# Patient Record
Sex: FEMALE | Race: WHITE | NOT HISPANIC OR LATINO | Employment: FULL TIME | URBAN - NONMETROPOLITAN AREA
[De-identification: names, ages, dates, MRNs, and addresses within clinical notes are randomized per-mention and may not be internally consistent; named-entity substitution may affect disease eponyms.]

---

## 2017-04-24 DIAGNOSIS — R53.81 MALAISE: ICD-10-CM

## 2017-04-24 DIAGNOSIS — Z00.00 GENERAL MEDICAL EXAM: Primary | ICD-10-CM

## 2017-04-26 ENCOUNTER — APPOINTMENT (OUTPATIENT)
Dept: LAB | Facility: HOSPITAL | Age: 32
End: 2017-04-26

## 2017-04-26 LAB
25(OH)D3 SERPL-MCNC: 33.4 NG/ML (ref 30–100)
ALBUMIN SERPL-MCNC: 4.8 G/DL (ref 3.4–4.8)
ALBUMIN/GLOB SERPL: 1.5 G/DL (ref 1.1–1.8)
ALP SERPL-CCNC: 76 U/L (ref 38–126)
ALT SERPL W P-5'-P-CCNC: 24 U/L (ref 9–52)
ANION GAP SERPL CALCULATED.3IONS-SCNC: 12 MMOL/L (ref 5–15)
ARTICHOKE IGE QN: 86 MG/DL (ref 1–129)
AST SERPL-CCNC: 37 U/L (ref 14–36)
BASOPHILS # BLD AUTO: 0.05 10*3/MM3 (ref 0–0.2)
BASOPHILS NFR BLD AUTO: 1 % (ref 0–2)
BILIRUB SERPL-MCNC: 1.6 MG/DL (ref 0.2–1.3)
BUN BLD-MCNC: 12 MG/DL (ref 7–21)
BUN/CREAT SERPL: 17.4 (ref 7–25)
CALCIUM SPEC-SCNC: 9.5 MG/DL (ref 8.4–10.2)
CHLORIDE SERPL-SCNC: 102 MMOL/L (ref 95–110)
CHOLEST SERPL-MCNC: 154 MG/DL (ref 0–199)
CO2 SERPL-SCNC: 27 MMOL/L (ref 22–31)
CREAT BLD-MCNC: 0.69 MG/DL (ref 0.5–1)
DEPRECATED RDW RBC AUTO: 41.2 FL (ref 36.4–46.3)
EOSINOPHIL # BLD AUTO: 0.08 10*3/MM3 (ref 0–0.7)
EOSINOPHIL NFR BLD AUTO: 1.6 % (ref 0–7)
ERYTHROCYTE [DISTWIDTH] IN BLOOD BY AUTOMATED COUNT: 12.7 % (ref 11.5–14.5)
GFR SERPL CREATININE-BSD FRML MDRD: 99 ML/MIN/1.73 (ref 64–149)
GLOBULIN UR ELPH-MCNC: 3.1 GM/DL (ref 2.3–3.5)
GLUCOSE BLD-MCNC: 92 MG/DL (ref 60–100)
HCT VFR BLD AUTO: 41.3 % (ref 35–45)
HDLC SERPL-MCNC: 44 MG/DL (ref 60–200)
HGB BLD-MCNC: 14.5 G/DL (ref 12–15.5)
IMM GRANULOCYTES # BLD: 0 10*3/MM3 (ref 0–0.02)
IMM GRANULOCYTES NFR BLD: 0 % (ref 0–0.5)
IRON 24H UR-MRATE: 107 MCG/DL (ref 37–170)
LDLC/HDLC SERPL: 2.24 {RATIO} (ref 0–3.22)
LYMPHOCYTES # BLD AUTO: 1.77 10*3/MM3 (ref 0.6–4.2)
LYMPHOCYTES NFR BLD AUTO: 36.5 % (ref 10–50)
MAGNESIUM SERPL-MCNC: 2.3 MG/DL (ref 1.6–2.3)
MCH RBC QN AUTO: 30.7 PG (ref 26.5–34)
MCHC RBC AUTO-ENTMCNC: 35.1 G/DL (ref 31.4–36)
MCV RBC AUTO: 87.5 FL (ref 80–98)
MONOCYTES # BLD AUTO: 0.37 10*3/MM3 (ref 0–0.9)
MONOCYTES NFR BLD AUTO: 7.6 % (ref 0–12)
NEUTROPHILS # BLD AUTO: 2.58 10*3/MM3 (ref 2–8.6)
NEUTROPHILS NFR BLD AUTO: 53.3 % (ref 37–80)
PLATELET # BLD AUTO: 283 10*3/MM3 (ref 150–450)
PMV BLD AUTO: 10.9 FL (ref 8–12)
POTASSIUM BLD-SCNC: 4 MMOL/L (ref 3.5–5.1)
PROT SERPL-MCNC: 7.9 G/DL (ref 6.3–8.6)
RBC # BLD AUTO: 4.72 10*6/MM3 (ref 3.77–5.16)
SODIUM BLD-SCNC: 141 MMOL/L (ref 137–145)
TRIGL SERPL-MCNC: 57 MG/DL (ref 20–199)
TSH SERPL DL<=0.05 MIU/L-ACNC: 0.84 MIU/ML (ref 0.46–4.68)
VIT B12 BLD-MCNC: 713 PG/ML (ref 239–931)
WBC NRBC COR # BLD: 4.85 10*3/MM3 (ref 3.2–9.8)

## 2017-04-26 PROCEDURE — 84443 ASSAY THYROID STIM HORMONE: CPT | Performed by: NURSE PRACTITIONER

## 2017-04-26 PROCEDURE — 83735 ASSAY OF MAGNESIUM: CPT | Performed by: NURSE PRACTITIONER

## 2017-04-26 PROCEDURE — 82306 VITAMIN D 25 HYDROXY: CPT | Performed by: NURSE PRACTITIONER

## 2017-04-26 PROCEDURE — 80061 LIPID PANEL: CPT | Performed by: NURSE PRACTITIONER

## 2017-04-26 PROCEDURE — 83525 ASSAY OF INSULIN: CPT | Performed by: NURSE PRACTITIONER

## 2017-04-26 PROCEDURE — 82607 VITAMIN B-12: CPT | Performed by: NURSE PRACTITIONER

## 2017-04-26 PROCEDURE — 83540 ASSAY OF IRON: CPT | Performed by: NURSE PRACTITIONER

## 2017-04-26 PROCEDURE — 80053 COMPREHEN METABOLIC PANEL: CPT | Performed by: NURSE PRACTITIONER

## 2017-04-26 PROCEDURE — 36415 COLL VENOUS BLD VENIPUNCTURE: CPT | Performed by: NURSE PRACTITIONER

## 2017-04-26 PROCEDURE — 85025 COMPLETE CBC W/AUTO DIFF WBC: CPT | Performed by: NURSE PRACTITIONER

## 2017-04-27 ENCOUNTER — OFFICE VISIT (OUTPATIENT)
Dept: FAMILY MEDICINE CLINIC | Facility: CLINIC | Age: 32
End: 2017-04-27

## 2017-04-27 VITALS
HEIGHT: 67 IN | SYSTOLIC BLOOD PRESSURE: 114 MMHG | WEIGHT: 177 LBS | BODY MASS INDEX: 27.78 KG/M2 | DIASTOLIC BLOOD PRESSURE: 80 MMHG

## 2017-04-27 DIAGNOSIS — E16.2 HYPOGLYCEMIA: Primary | ICD-10-CM

## 2017-04-27 DIAGNOSIS — R53.81 MALAISE: ICD-10-CM

## 2017-04-27 PROCEDURE — 99213 OFFICE O/P EST LOW 20 MIN: CPT | Performed by: NURSE PRACTITIONER

## 2017-04-27 NOTE — PROGRESS NOTES
Chief Complaint   Patient presents with   • Follow-up     sugars dropping low      Subjective   Christie Griffith is a 32 y.o. female.     Blood Sugar Problem   This is a recurrent problem. The problem occurs constantly. The problem has been rapidly worsening. Associated symptoms include fatigue. Pertinent negatives include no abdominal pain, anorexia, arthralgias, change in bowel habit, chest pain, chills, congestion, coughing, diaphoresis, fever, headaches, joint swelling, myalgias, nausea, neck pain, numbness, rash, sore throat, swollen glands, urinary symptoms, vertigo, visual change, vomiting or weakness. Exacerbated by: bs dropping-has to drink coke or eat candy often to increase levels. She has tried nothing for the symptoms. The treatment provided mild relief.        The following portions of the patient's history were reviewed and updated as appropriate: allergies, current medications, past social history and problem list.    Review of Systems   Constitutional: Positive for activity change and fatigue. Negative for chills, diaphoresis and fever.        Bs dropping-both parents are diabetic    HENT: Negative.  Negative for congestion and sore throat.    Eyes: Negative.    Respiratory: Negative.  Negative for apnea, cough, choking, chest tightness, shortness of breath, wheezing and stridor.    Cardiovascular: Negative.  Negative for chest pain.   Gastrointestinal: Negative.  Negative for abdominal pain, anorexia, change in bowel habit, nausea and vomiting.   Endocrine: Negative.    Genitourinary: Negative.  Negative for difficulty urinating, dyspareunia, flank pain, frequency, pelvic pain and vaginal pain.   Musculoskeletal: Negative.  Negative for arthralgias, back pain, gait problem, joint swelling, myalgias, neck pain and neck stiffness.   Skin: Negative for color change and rash.   Allergic/Immunologic: Negative.  Negative for environmental allergies.   Neurological: Negative.  Negative for dizziness,  "vertigo, syncope, weakness, numbness and headaches.   Hematological: Negative.  Negative for adenopathy.   Psychiatric/Behavioral: Negative.  Negative for agitation, behavioral problems and confusion. The patient is not nervous/anxious.        Objective   /80  Ht 67\" (170.2 cm)  Wt 177 lb (80.3 kg)  BMI 27.72 kg/m2  Physical Exam   Constitutional: She is oriented to person, place, and time. She appears well-developed and well-nourished. No distress.   HENT:   Head: Normocephalic and atraumatic.   Eyes: EOM are normal. Pupils are equal, round, and reactive to light.   Neck: Normal range of motion. Neck supple.   Cardiovascular: Normal rate, regular rhythm, normal heart sounds and normal pulses.  Exam reveals no gallop and no friction rub.    No murmur heard.  Pulmonary/Chest: Effort normal and breath sounds normal. No respiratory distress. She has no wheezes. She has no rales.   Abdominal: Soft. Bowel sounds are normal. She exhibits no distension and no mass. There is no tenderness. There is no rebound and no guarding. No hernia.   Genitourinary: Rectum normal and vagina normal. Pelvic exam was performed with patient supine. Uterus is not deviated, not enlarged, not fixed and not tender. Cervix exhibits no motion tenderness, no discharge and no friability. Right adnexum displays no mass. Left adnexum displays no mass.   Musculoskeletal: Normal range of motion.   Neurological: She is alert and oriented to person, place, and time. She has normal reflexes. She displays normal reflexes. No cranial nerve deficit. She exhibits normal muscle tone. Coordination normal.   Skin: Skin is warm and dry. No rash noted. She is not diaphoretic. No erythema. No pallor.   Psychiatric: She has a normal mood and affect.   Nursing note and vitals reviewed.      Assessment/Plan   Problem List Items Addressed This Visit        Endocrine    Hypoglycemia - Primary       Other    Malaise    Relevant Orders    Insulin, Random         " No orders of the defined types were placed in this encounter.      It's not just what you eat, but when you eat  Eat breakfast, and eat smaller meals throughout the day. A healthy breakfast can jumpstart your metabolism, while eating small, healthy meals (rather than the standard three large meals) keeps your energy up.   Avoid eating at night. Try to eat dinner earlier and fast for 14-16 hours until breakfast the next morning. Studies suggest that eating only when you’re most active and giving your digestive system a long break each day may help to regulate weight.     Adding insulin level to labs-follow diabetic diet also add low carb high protein in the mix

## 2017-04-29 LAB — INSULIN SERPL-ACNC: 11.4 UIU/ML (ref 2.6–24.9)

## 2017-12-04 DIAGNOSIS — M54.50 PAIN OF LUMBAR SPINE: Primary | ICD-10-CM

## 2017-12-06 ENCOUNTER — TELEPHONE (OUTPATIENT)
Dept: FAMILY MEDICINE CLINIC | Facility: CLINIC | Age: 32
End: 2017-12-06

## 2017-12-06 RX ORDER — BACLOFEN 10 MG/1
10 TABLET ORAL 3 TIMES DAILY
Qty: 60 TABLET | Refills: 2 | Status: SHIPPED | OUTPATIENT
Start: 2017-12-06 | End: 2019-02-20

## 2017-12-06 NOTE — PROGRESS NOTES
MELONY Newman, Ms. Griffith has been called with her recent X-ray results & recommendations.  Continue her current medications and follow-up as planned or sooner if any problems.    Script sent to [facility]

## 2017-12-06 NOTE — TELEPHONE ENCOUNTER
MELONY Newman, Ms. Griffith has been called with her recent X-ray results & recommendations.  Continue her current medications and follow-up as planned or sooner if any problems.    Script sent to [facility]      ----- Message from MELONY Rizo sent at 12/6/2017  8:09 AM CST -----  Can you let her know it showed spasm in the low back-can add baclfen 10mg tid prn -should not make her tired. Stretching and chiropractor is what I would suggest at this point.

## 2018-03-02 RX ORDER — SULFAMETHOXAZOLE AND TRIMETHOPRIM 800; 160 MG/1; MG/1
1 TABLET ORAL 2 TIMES DAILY
Qty: 20 TABLET | Refills: 0 | Status: SHIPPED | OUTPATIENT
Start: 2018-03-02 | End: 2019-02-20

## 2019-02-20 PROBLEM — J11.1 INFLUENZA-LIKE ILLNESS: Status: ACTIVE | Noted: 2019-02-20

## 2019-02-20 RX ORDER — OSELTAMIVIR PHOSPHATE 75 MG/1
75 CAPSULE ORAL 2 TIMES DAILY
Qty: 14 CAPSULE | Refills: 0 | Status: SHIPPED | OUTPATIENT
Start: 2019-02-20 | End: 2019-02-20

## 2019-03-13 ENCOUNTER — APPOINTMENT (OUTPATIENT)
Dept: LAB | Facility: HOSPITAL | Age: 34
End: 2019-03-13

## 2019-03-13 ENCOUNTER — OFFICE VISIT (OUTPATIENT)
Dept: FAMILY MEDICINE CLINIC | Facility: CLINIC | Age: 34
End: 2019-03-13

## 2019-03-13 VITALS
DIASTOLIC BLOOD PRESSURE: 70 MMHG | BODY MASS INDEX: 27.47 KG/M2 | WEIGHT: 175 LBS | SYSTOLIC BLOOD PRESSURE: 110 MMHG | HEIGHT: 67 IN

## 2019-03-13 DIAGNOSIS — R53.81 MALAISE: ICD-10-CM

## 2019-03-13 LAB
ALBUMIN SERPL-MCNC: 4.8 G/DL (ref 3.4–4.8)
ALBUMIN/GLOB SERPL: 1.6 G/DL (ref 1.1–1.8)
ALP SERPL-CCNC: 65 U/L (ref 38–126)
ALT SERPL W P-5'-P-CCNC: 27 U/L (ref 9–52)
ANION GAP SERPL CALCULATED.3IONS-SCNC: 10 MMOL/L (ref 5–15)
AST SERPL-CCNC: 61 U/L (ref 14–36)
BASOPHILS # BLD AUTO: 0.1 10*3/MM3 (ref 0–0.2)
BASOPHILS NFR BLD AUTO: 1 % (ref 0–1.5)
BILIRUB SERPL-MCNC: 0.8 MG/DL (ref 0.2–1.3)
BUN BLD-MCNC: 14 MG/DL (ref 7–21)
BUN/CREAT SERPL: 25.9 (ref 7–25)
CALCIUM SPEC-SCNC: 9.8 MG/DL (ref 8.4–10.2)
CHLORIDE SERPL-SCNC: 100 MMOL/L (ref 95–110)
CO2 SERPL-SCNC: 26 MMOL/L (ref 22–31)
CREAT BLD-MCNC: 0.54 MG/DL (ref 0.5–1)
DEPRECATED RDW RBC AUTO: 38.5 FL (ref 37–54)
EOSINOPHIL # BLD AUTO: 0.13 10*3/MM3 (ref 0–0.4)
EOSINOPHIL NFR BLD AUTO: 1.3 % (ref 0.3–6.2)
ERYTHROCYTE [DISTWIDTH] IN BLOOD BY AUTOMATED COUNT: 12.2 % (ref 12.3–15.4)
GFR SERPL CREATININE-BSD FRML MDRD: 130 ML/MIN/1.73 (ref 64–149)
GLOBULIN UR ELPH-MCNC: 3 GM/DL (ref 2.3–3.5)
GLUCOSE BLD-MCNC: 86 MG/DL (ref 60–100)
HBA1C MFR BLD: 5 % (ref 4–5.6)
HCT VFR BLD AUTO: 37.6 % (ref 34–46.6)
HGB BLD-MCNC: 13.4 G/DL (ref 12–15.9)
IMM GRANULOCYTES # BLD AUTO: 0.02 10*3/MM3 (ref 0–0.05)
IMM GRANULOCYTES NFR BLD AUTO: 0.2 % (ref 0–0.5)
IRON 24H UR-MRATE: 68 MCG/DL (ref 37–170)
LYMPHOCYTES # BLD AUTO: 2.9 10*3/MM3 (ref 0.7–3.1)
LYMPHOCYTES NFR BLD AUTO: 28.7 % (ref 19.6–45.3)
MCH RBC QN AUTO: 30.7 PG (ref 26.6–33)
MCHC RBC AUTO-ENTMCNC: 35.6 G/DL (ref 31.5–35.7)
MCV RBC AUTO: 86 FL (ref 79–97)
MONOCYTES # BLD AUTO: 0.72 10*3/MM3 (ref 0.1–0.9)
MONOCYTES NFR BLD AUTO: 7.1 % (ref 5–12)
NEUTROPHILS # BLD AUTO: 6.24 10*3/MM3 (ref 1.4–7)
NEUTROPHILS NFR BLD AUTO: 61.7 % (ref 42.7–76)
NRBC BLD AUTO-RTO: 0 /100 WBC (ref 0–0)
PLATELET # BLD AUTO: 351 10*3/MM3 (ref 140–450)
PMV BLD AUTO: 11.3 FL (ref 6–12)
POTASSIUM BLD-SCNC: 3.6 MMOL/L (ref 3.5–5.1)
PROT SERPL-MCNC: 7.8 G/DL (ref 6.3–8.6)
RBC # BLD AUTO: 4.37 10*6/MM3 (ref 3.77–5.28)
SODIUM BLD-SCNC: 136 MMOL/L (ref 137–145)
TSH SERPL DL<=0.05 MIU/L-ACNC: 0.74 MIU/ML (ref 0.46–4.68)
WBC NRBC COR # BLD: 10.11 10*3/MM3 (ref 3.4–10.8)

## 2019-03-13 PROCEDURE — 83036 HEMOGLOBIN GLYCOSYLATED A1C: CPT | Performed by: NURSE PRACTITIONER

## 2019-03-13 PROCEDURE — 36415 COLL VENOUS BLD VENIPUNCTURE: CPT | Performed by: NURSE PRACTITIONER

## 2019-03-13 PROCEDURE — 84443 ASSAY THYROID STIM HORMONE: CPT | Performed by: NURSE PRACTITIONER

## 2019-03-13 PROCEDURE — 83540 ASSAY OF IRON: CPT | Performed by: NURSE PRACTITIONER

## 2019-03-13 PROCEDURE — 99213 OFFICE O/P EST LOW 20 MIN: CPT | Performed by: NURSE PRACTITIONER

## 2019-03-13 PROCEDURE — 85025 COMPLETE CBC W/AUTO DIFF WBC: CPT | Performed by: NURSE PRACTITIONER

## 2019-03-13 PROCEDURE — 80053 COMPREHEN METABOLIC PANEL: CPT | Performed by: NURSE PRACTITIONER

## 2019-03-13 NOTE — PROGRESS NOTES
Chief Complaint   Patient presents with   • Weight Issues     having trouble losing weight     Subjective   Christie Griffith is a 33 y.o. female.     Obesity   This is a recurrent problem. The current episode started more than 1 month ago. The problem occurs every several days. The problem has been gradually worsening. Associated symptoms include fatigue. Pertinent negatives include no abdominal pain, anorexia, arthralgias, change in bowel habit, chest pain, chills, congestion, coughing, diaphoresis, fever, headaches, joint swelling, myalgias, nausea, neck pain, numbness, rash, sore throat, swollen glands, urinary symptoms, vertigo, visual change, vomiting or weakness. Exacerbated by: age. Treatments tried: low calorie diet  The treatment provided mild relief.   Fatigue   This is a new problem. The current episode started 1 to 4 weeks ago. The problem occurs every several days. The problem has been gradually worsening. Associated symptoms include fatigue. Pertinent negatives include no abdominal pain, anorexia, arthralgias, change in bowel habit, chest pain, chills, congestion, coughing, diaphoresis, fever, headaches, joint swelling, myalgias, nausea, neck pain, numbness, rash, sore throat, swollen glands, urinary symptoms, vertigo, visual change, vomiting or weakness. She has tried rest for the symptoms. The treatment provided mild relief.        The following portions of the patient's history were reviewed and updated as appropriate: allergies, current medications, past social history and problem list.    Review of Systems   Constitutional: Positive for activity change, fatigue and unexpected weight change. Negative for appetite change, chills, diaphoresis and fever.        Low calorie, low carb diet , increased exercise and water    HENT: Negative.  Negative for congestion, dental problem, drooling and sore throat.    Eyes: Negative.  Negative for photophobia, pain, discharge, redness, itching and visual  "disturbance.   Respiratory: Negative.  Negative for apnea, cough, choking and chest tightness.    Cardiovascular: Negative.  Negative for chest pain.   Gastrointestinal: Negative.  Negative for abdominal distention, abdominal pain, anal bleeding, anorexia, blood in stool, change in bowel habit, nausea and vomiting.   Endocrine: Negative.  Negative for cold intolerance, heat intolerance, polydipsia, polyphagia and polyuria.   Genitourinary: Negative.  Negative for difficulty urinating and dyspareunia.   Musculoskeletal: Negative.  Negative for arthralgias, back pain, gait problem, joint swelling, myalgias, neck pain and neck stiffness.   Skin: Negative.  Negative for color change, pallor and rash.   Allergic/Immunologic: Negative.  Negative for environmental allergies, food allergies and immunocompromised state.   Neurological: Negative.  Negative for dizziness, vertigo, tremors, seizures, syncope, facial asymmetry, speech difficulty, weakness, light-headedness, numbness and headaches.   Hematological: Negative.  Negative for adenopathy. Does not bruise/bleed easily.   Psychiatric/Behavioral: Negative.  Negative for agitation, behavioral problems, confusion, decreased concentration, dysphoric mood and hallucinations.       Objective   /70   Ht 170.2 cm (67\")   Wt 79.4 kg (175 lb)   BMI 27.41 kg/m²   Physical Exam   Constitutional: She is oriented to person, place, and time. She appears well-developed and well-nourished. No distress.   HENT:   Head: Normocephalic and atraumatic.   Right Ear: External ear normal.   Left Ear: External ear normal.   Mouth/Throat: Oropharynx is clear and moist. No oropharyngeal exudate.   Eyes: EOM are normal. Pupils are equal, round, and reactive to light. Right eye exhibits no discharge. Left eye exhibits no discharge. No scleral icterus.   Neck: Normal range of motion. Neck supple. No JVD present. No tracheal deviation present. No thyromegaly present.   Cardiovascular: Normal " rate, regular rhythm, normal heart sounds and intact distal pulses. Exam reveals no gallop and no friction rub.   No murmur heard.  Pulmonary/Chest: Effort normal and breath sounds normal. No stridor. No respiratory distress. She has no wheezes. She has no rales. She exhibits no tenderness.   Abdominal: Soft. Bowel sounds are normal. She exhibits no distension and no mass. There is no tenderness. There is no rebound and no guarding. No hernia.   Musculoskeletal: Normal range of motion. She exhibits no edema, tenderness or deformity.   Lymphadenopathy:     She has no cervical adenopathy.   Neurological: She is alert and oriented to person, place, and time. She displays normal reflexes. No cranial nerve deficit or sensory deficit. She exhibits normal muscle tone. Coordination normal.   Skin: Skin is warm and dry. No rash noted. She is not diaphoretic. No erythema. No pallor.   Nursing note and vitals reviewed.      Assessment/Plan   Problem List Items Addressed This Visit        Other    Malaise    BMI 27.0-27.9,adult - Primary    Relevant Orders    CBC & Differential    Comprehensive Metabolic Panel    Iron    TSH    Hemoglobin A1c    CBC Auto Differential           New Medications Ordered This Visit   Medications   • Lorcaserin HCl ER (BELVIQ XR) 20 MG tablet sustained-release 24 hour     Sig: Take 1 tablet by mouth Daily.     Dispense:  30 tablet     Refill:  0       Patient understands the risks associated with this controlled medication, including tolerance and addiction.  she also agrees to only obtain this medication from me, and not from a another provider, unless that provider is covering for me in my absence.  she also agrees to be compliant in dosing, and not self adjust the dose of medication.  A signed controlled substance agreement is on file, and she has received a controlled substance sheet at this a previous visit.  she has also signed a consent feducation or treatment with a controlled substance as  per [facility] policy. YANCY was obtained.      It's not just what you eat, but when you eat  Eat breakfast, and eat smaller meals throughout the day. A healthy breakfast can jumpstart your metabolism, while eating small, healthy meals (rather than the standard three large meals) keeps your energy up.   Avoid eating at night. Try to eat dinner earlier and fast for 14-16 hours until breakfast the next morning. Studies suggest that eating only when you’re most active and giving your digestive system a long break each day may help to regulate weight.     meds as directed, diet discussed, follow up as directed-see back in 1 month for evaluation of meds and weight check

## 2019-03-14 ENCOUNTER — TELEPHONE (OUTPATIENT)
Dept: FAMILY MEDICINE CLINIC | Facility: CLINIC | Age: 34
End: 2019-03-14

## 2019-03-14 NOTE — TELEPHONE ENCOUNTER
Per MELONY Cooley, Ms. Griffith has been called with recent lab results & recommendations.  Continue current medications and follow-up as planned or sooner if any problems.      ----- Message from MELONY Galloway sent at 3/14/2019  8:00 AM CDT -----  Can you let her know labs look good-no diabetes or thyroid problems noted

## 2019-03-14 NOTE — PROGRESS NOTES
Per MELONY Cooley, Ms. Griffith has been called with recent lab results & recommendations.  Continue current medications and follow-up as planned or sooner if any problems.

## 2019-03-27 ENCOUNTER — TELEPHONE (OUTPATIENT)
Dept: FAMILY MEDICINE CLINIC | Facility: CLINIC | Age: 34
End: 2019-03-27

## 2019-04-12 ENCOUNTER — OFFICE VISIT (OUTPATIENT)
Dept: FAMILY MEDICINE CLINIC | Facility: CLINIC | Age: 34
End: 2019-04-12

## 2019-04-12 VITALS
DIASTOLIC BLOOD PRESSURE: 72 MMHG | WEIGHT: 178 LBS | SYSTOLIC BLOOD PRESSURE: 120 MMHG | BODY MASS INDEX: 27.94 KG/M2 | HEIGHT: 67 IN

## 2019-04-12 DIAGNOSIS — E16.2 HYPOGLYCEMIA: ICD-10-CM

## 2019-04-12 PROCEDURE — 99213 OFFICE O/P EST LOW 20 MIN: CPT | Performed by: NURSE PRACTITIONER

## 2019-04-12 RX ORDER — PHENTERMINE HYDROCHLORIDE 30 MG/1
30 CAPSULE ORAL EVERY MORNING
Qty: 30 CAPSULE | Refills: 0 | Status: SHIPPED | OUTPATIENT
Start: 2019-04-12 | End: 2019-05-16 | Stop reason: SDUPTHER

## 2019-04-12 NOTE — PROGRESS NOTES
Chief Complaint   Patient presents with   • Weight Check     could not tolerate      Subjective   Christie Griffith is a 34 y.o. female.     Presents with recheck of weight check -could not tolerated belviq due to side effects of severe headaches       Obesity   This is a recurrent problem. The current episode started more than 1 month ago. The problem occurs every several days. The problem has been gradually worsening. Associated symptoms include fatigue. Pertinent negatives include no abdominal pain, anorexia, arthralgias, change in bowel habit, chest pain, chills, congestion, coughing, diaphoresis, fever, headaches, joint swelling, myalgias, nausea, neck pain, numbness, rash, sore throat, swollen glands, urinary symptoms, vertigo, visual change, vomiting or weakness. Exacerbated by: age. Treatments tried: low calorie diet  The treatment provided mild relief.   Fatigue   This is a new problem. The current episode started 1 to 4 weeks ago. The problem occurs every several days. The problem has been gradually worsening. Associated symptoms include fatigue. Pertinent negatives include no abdominal pain, anorexia, arthralgias, change in bowel habit, chest pain, chills, congestion, coughing, diaphoresis, fever, headaches, joint swelling, myalgias, nausea, neck pain, numbness, rash, sore throat, swollen glands, urinary symptoms, vertigo, visual change, vomiting or weakness. She has tried rest for the symptoms. The treatment provided mild relief.        The following portions of the patient's history were reviewed and updated as appropriate: allergies, current medications, past social history and problem list.    Review of Systems   Constitutional: Positive for activity change, fatigue and unexpected weight change. Negative for appetite change, chills, diaphoresis and fever.        Low calorie, low carb diet , increased exercise and water    HENT: Negative.  Negative for congestion, dental problem, drooling and sore  "throat.    Eyes: Negative.  Negative for photophobia, pain, discharge, redness, itching and visual disturbance.   Respiratory: Negative.  Negative for apnea, cough, choking and chest tightness.    Cardiovascular: Negative.  Negative for chest pain, palpitations and leg swelling.   Gastrointestinal: Negative.  Negative for abdominal distention, abdominal pain, anal bleeding, anorexia, blood in stool, change in bowel habit, nausea and vomiting.   Endocrine: Negative.  Negative for cold intolerance, heat intolerance, polydipsia, polyphagia and polyuria.   Genitourinary: Negative.  Negative for difficulty urinating and dyspareunia.   Musculoskeletal: Negative.  Negative for arthralgias, back pain, gait problem, joint swelling, myalgias, neck pain and neck stiffness.   Skin: Negative.  Negative for color change, pallor and rash.   Allergic/Immunologic: Negative.  Negative for environmental allergies, food allergies and immunocompromised state.   Neurological: Negative.  Negative for dizziness, vertigo, tremors, seizures, syncope, facial asymmetry, speech difficulty, weakness, light-headedness, numbness and headaches.   Hematological: Negative.  Negative for adenopathy. Does not bruise/bleed easily.   Psychiatric/Behavioral: Negative.  Negative for agitation, behavioral problems, confusion, decreased concentration, dysphoric mood and hallucinations. The patient is not nervous/anxious and is not hyperactive.        Objective   /72   Ht 170.2 cm (67\")   Wt 80.7 kg (178 lb)   BMI 27.88 kg/m²   Physical Exam   Constitutional: She is oriented to person, place, and time. She appears well-developed and well-nourished. No distress.   HENT:   Head: Normocephalic and atraumatic.   Right Ear: External ear normal.   Left Ear: External ear normal.   Mouth/Throat: Oropharynx is clear and moist. No oropharyngeal exudate.   Eyes: EOM are normal. Pupils are equal, round, and reactive to light. Right eye exhibits no discharge. " Left eye exhibits no discharge. No scleral icterus.   Neck: Normal range of motion. Neck supple. No JVD present. No tracheal deviation present. No thyromegaly present.   Cardiovascular: Normal rate, regular rhythm, normal heart sounds and intact distal pulses. Exam reveals no gallop and no friction rub.   No murmur heard.  Pulmonary/Chest: Effort normal and breath sounds normal. No stridor. No respiratory distress. She has no wheezes. She has no rales. She exhibits no tenderness.   Abdominal: Soft. Bowel sounds are normal. She exhibits no distension and no mass. There is no tenderness. There is no rebound and no guarding. No hernia.   Musculoskeletal: Normal range of motion. She exhibits no edema, tenderness or deformity.   Lymphadenopathy:     She has no cervical adenopathy.   Neurological: She is alert and oriented to person, place, and time. She displays normal reflexes. No cranial nerve deficit or sensory deficit. She exhibits normal muscle tone. Coordination normal.   Skin: Skin is warm and dry. No rash noted. She is not diaphoretic. No erythema. No pallor.   Nursing note and vitals reviewed.      Assessment/Plan   Problem List Items Addressed This Visit        Endocrine    Hypoglycemia       Other    BMI 27.0-27.9,adult - Primary           New Medications Ordered This Visit   Medications   • phentermine 30 MG capsule     Sig: Take 1 capsule by mouth Every Morning.     Dispense:  30 capsule     Refill:  0       It's not just what you eat, but when you eat  Eat breakfast, and eat smaller meals throughout the day. A healthy breakfast can jumpstart your metabolism, while eating small, healthy meals (rather than the standard three large meals) keeps your energy up.   Avoid eating at night. Try to eat dinner earlier and fast for 14-16 hours until breakfast the next morning. Studies suggest that eating only when you’re most active and giving your digestive system a long break each day may help to regulate weight.      Patient unable to tolerate belviq due to headaches and dizziness, labs good, no thyroid problems, bp is good, will try phenteramine for 1 month with strict diet changes and recheck in 1 month

## 2019-05-16 ENCOUNTER — TELEPHONE (OUTPATIENT)
Dept: FAMILY MEDICINE CLINIC | Facility: CLINIC | Age: 34
End: 2019-05-16

## 2019-05-16 ENCOUNTER — OFFICE VISIT (OUTPATIENT)
Dept: FAMILY MEDICINE CLINIC | Facility: CLINIC | Age: 34
End: 2019-05-16

## 2019-05-16 VITALS
WEIGHT: 166 LBS | DIASTOLIC BLOOD PRESSURE: 90 MMHG | BODY MASS INDEX: 26.06 KG/M2 | SYSTOLIC BLOOD PRESSURE: 130 MMHG | HEIGHT: 67 IN

## 2019-05-16 DIAGNOSIS — R53.81 MALAISE: ICD-10-CM

## 2019-05-16 DIAGNOSIS — E66.3 OVERWEIGHT (BMI 25.0-29.9): Primary | ICD-10-CM

## 2019-05-16 PROBLEM — E66.09 OBESITY DUE TO EXCESS CALORIES WITHOUT SERIOUS COMORBIDITY: Status: ACTIVE | Noted: 2019-05-16

## 2019-05-16 PROCEDURE — 99213 OFFICE O/P EST LOW 20 MIN: CPT | Performed by: NURSE PRACTITIONER

## 2019-05-16 RX ORDER — PHENTERMINE HYDROCHLORIDE 30 MG/1
30 CAPSULE ORAL EVERY MORNING
Qty: 30 CAPSULE | Refills: 0 | Status: SHIPPED | OUTPATIENT
Start: 2019-05-16 | End: 2019-10-24

## 2019-05-16 NOTE — PROGRESS NOTES
Chief Complaint   Patient presents with   • Weight Check     1 month      Subjective   Christie Griffith is a 34 y.o. female.     Presents with 1 month weight check -she has lost 10 pounds eating better, more exercise, could not tolerate belviq, insurance would not cover saxenda         The following portions of the patient's history were reviewed and updated as appropriate: allergies, current medications, past social history and problem list.    Review of Systems   Constitutional: Positive for activity change and fatigue. Negative for appetite change, chills, diaphoresis, fever and unexpected weight change.        Low calorie, low carb diet , increased exercise and water    HENT: Negative.  Negative for congestion, dental problem, drooling and sore throat.    Eyes: Negative.  Negative for photophobia, pain, discharge, redness, itching and visual disturbance.   Respiratory: Negative.  Negative for apnea, cough, choking, chest tightness and shortness of breath.    Cardiovascular: Negative.  Negative for chest pain.   Gastrointestinal: Negative for abdominal distention, abdominal pain, anal bleeding, blood in stool, constipation, diarrhea, nausea and vomiting.   Endocrine: Negative.  Negative for cold intolerance, heat intolerance, polydipsia, polyphagia and polyuria.   Genitourinary: Negative.  Negative for difficulty urinating and dyspareunia.   Musculoskeletal: Negative.  Negative for arthralgias, back pain, gait problem, joint swelling, myalgias, neck pain and neck stiffness.   Skin: Negative.  Negative for color change, pallor and rash.   Allergic/Immunologic: Negative.  Negative for environmental allergies, food allergies and immunocompromised state.   Neurological: Negative.  Negative for dizziness, tremors, seizures, syncope, facial asymmetry, speech difficulty, weakness, light-headedness, numbness and headaches.   Hematological: Negative.  Negative for adenopathy. Does not bruise/bleed easily.  "  Psychiatric/Behavioral: Negative.  Negative for agitation, behavioral problems, confusion, decreased concentration, dysphoric mood, hallucinations and self-injury. The patient is not nervous/anxious and is not hyperactive.        Objective   /90   Ht 170.2 cm (67\")   Wt 75.3 kg (166 lb)   BMI 26.00 kg/m²   Physical Exam   Constitutional: She is oriented to person, place, and time. She appears well-developed and well-nourished. No distress.   HENT:   Head: Normocephalic and atraumatic.   Right Ear: External ear normal.   Left Ear: External ear normal.   Mouth/Throat: Oropharynx is clear and moist. No oropharyngeal exudate.   Eyes: EOM are normal. Pupils are equal, round, and reactive to light. Right eye exhibits no discharge. Left eye exhibits no discharge. No scleral icterus.   Neck: Normal range of motion. Neck supple. No JVD present. No tracheal deviation present. No thyromegaly present.   Cardiovascular: Normal rate, regular rhythm, normal heart sounds and intact distal pulses. Exam reveals no gallop and no friction rub.   No murmur heard.  Pulmonary/Chest: Effort normal and breath sounds normal. No stridor. No respiratory distress. She has no wheezes. She has no rales. She exhibits no tenderness.   Abdominal: Soft. Bowel sounds are normal. She exhibits no distension and no mass. There is no tenderness. There is no rebound and no guarding. No hernia.   Musculoskeletal: Normal range of motion. She exhibits no edema, tenderness or deformity.   Lymphadenopathy:     She has no cervical adenopathy.   Neurological: She is alert and oriented to person, place, and time. She displays normal reflexes. No cranial nerve deficit or sensory deficit. She exhibits normal muscle tone. Coordination normal.   Skin: Skin is warm and dry. No rash noted. She is not diaphoretic. No erythema. No pallor.   Nursing note and vitals reviewed.      Assessment/Plan   Problem List Items Addressed This Visit        Digestive    " Obesity due to excess calories without serious comorbidity - Primary       Other    Malaise           New Medications Ordered This Visit   Medications   • phentermine 30 MG capsule     Sig: Take 1 capsule by mouth Every Morning.     Dispense:  30 capsule     Refill:  0      diet discussed, meds reviewed, see back in1 month for recheck of meds     It's not just what you eat, but when you eat  Eat breakfast, and eat smaller meals throughout the day. A healthy breakfast can jumpstart your metabolism, while eating small, healthy meals (rather than the standard three large meals) keeps your energy up.   Avoid eating at night. Try to eat dinner earlier and fast for 14-16 hours until breakfast the next morning. Studies suggest that eating only when you’re most active and giving your digestive system a long break each day may help to regulate weight.     Patient understands the risks associated with this controlled medication, including tolerance and addiction.  she also agrees to only obtain this medication from me, and not from a another provider, unless that provider is covering for me in my absence.  she also agrees to be compliant in dosing, and not self adjust the dose of medication.  A signed controlled substance agreement is on file, and she has received a controlled substance education sheet at this a previous visit.  she has also signed a consent for treatment with a controlled substance as per [facility] policy. YANCY was obtained.

## 2019-10-24 ENCOUNTER — OFFICE VISIT (OUTPATIENT)
Dept: FAMILY MEDICINE CLINIC | Facility: CLINIC | Age: 34
End: 2019-10-24

## 2019-10-24 VITALS
SYSTOLIC BLOOD PRESSURE: 120 MMHG | BODY MASS INDEX: 26.53 KG/M2 | HEIGHT: 67 IN | DIASTOLIC BLOOD PRESSURE: 80 MMHG | WEIGHT: 169 LBS

## 2019-10-24 DIAGNOSIS — E66.3 OVERWEIGHT (BMI 25.0-29.9): ICD-10-CM

## 2019-10-24 DIAGNOSIS — E16.2 HYPOGLYCEMIA: ICD-10-CM

## 2019-10-24 PROCEDURE — 99214 OFFICE O/P EST MOD 30 MIN: CPT | Performed by: NURSE PRACTITIONER

## 2019-10-24 RX ORDER — PHENTERMINE HYDROCHLORIDE 37.5 MG/1
37.5 CAPSULE ORAL EVERY MORNING
Qty: 30 CAPSULE | Refills: 0 | OUTPATIENT
Start: 2019-10-24 | End: 2020-02-23

## 2019-10-24 NOTE — PROGRESS NOTES
Chief Complaint   Patient presents with   • Weight Check     Subjective   Christie Griffith is a 34 y.o. female.     Presents with 1 month weight check -, more exercise, could not tolerate belviq, insurance would not cover saxenda, gaining weight, family hx of dm-both mother and father-patient has hypoglycemia          The following portions of the patient's history were reviewed and updated as appropriate: allergies, current medications, past social history and problem list.    Review of Systems   Constitutional: Positive for activity change and fatigue. Negative for appetite change, chills, diaphoresis, fever and unexpected weight change.        Low calorie, low carb diet , increased exercise and water    HENT: Negative.  Negative for congestion, dental problem, drooling and sore throat.    Eyes: Negative.  Negative for photophobia, pain, discharge, redness, itching and visual disturbance.   Respiratory: Negative.  Negative for apnea, cough, choking, chest tightness and shortness of breath.    Cardiovascular: Negative.  Negative for chest pain.   Gastrointestinal: Negative for abdominal distention, abdominal pain, anal bleeding, blood in stool, constipation, diarrhea, nausea and vomiting.   Endocrine: Negative.  Negative for cold intolerance, heat intolerance, polydipsia, polyphagia and polyuria.   Genitourinary: Negative.  Negative for difficulty urinating and dyspareunia.   Musculoskeletal: Negative.  Negative for arthralgias, back pain, gait problem, joint swelling, myalgias, neck pain and neck stiffness.   Skin: Negative.  Negative for color change, pallor and rash.   Allergic/Immunologic: Negative.  Negative for environmental allergies, food allergies and immunocompromised state.   Neurological: Negative.  Negative for dizziness, tremors, seizures, syncope, facial asymmetry, speech difficulty, weakness, light-headedness, numbness and headaches.   Hematological: Negative.  Negative for adenopathy. Does not  "bruise/bleed easily.   Psychiatric/Behavioral: Negative.  Negative for agitation, behavioral problems, confusion, decreased concentration, dysphoric mood, hallucinations and self-injury. The patient is not nervous/anxious and is not hyperactive.        Objective   /80   Ht 170.2 cm (67\")   Wt 76.7 kg (169 lb)   BMI 26.47 kg/m²   Physical Exam   Constitutional: She is oriented to person, place, and time. She appears well-developed and well-nourished. No distress.   HENT:   Head: Normocephalic and atraumatic.   Right Ear: External ear normal.   Left Ear: External ear normal.   Mouth/Throat: Oropharynx is clear and moist. No oropharyngeal exudate.   Eyes: EOM are normal. Pupils are equal, round, and reactive to light. Right eye exhibits no discharge. Left eye exhibits no discharge. No scleral icterus.   Neck: Normal range of motion. Neck supple. No JVD present. No tracheal deviation present. No thyromegaly present.   Cardiovascular: Normal rate, regular rhythm, normal heart sounds and intact distal pulses. Exam reveals no gallop and no friction rub.   No murmur heard.  Pulmonary/Chest: Effort normal and breath sounds normal. No stridor. No respiratory distress. She has no wheezes. She has no rales. She exhibits no tenderness.   Abdominal: Soft. Bowel sounds are normal. She exhibits no distension and no mass. There is no tenderness. There is no rebound and no guarding. No hernia.   Musculoskeletal: Normal range of motion. She exhibits no edema, tenderness or deformity.   Lymphadenopathy:     She has no cervical adenopathy.   Neurological: She is alert and oriented to person, place, and time. She displays normal reflexes. No cranial nerve deficit or sensory deficit. She exhibits normal muscle tone. Coordination normal.   Skin: Skin is warm and dry. No rash noted. She is not diaphoretic. No erythema. No pallor.   Nursing note and vitals reviewed.      Assessment/Plan   Problem List Items Addressed This Visit     "    Endocrine    Hypoglycemia    Relevant Orders    CBC & Differential    Comprehensive Metabolic Panel    Iron    TSH       Other    Adult BMI 26.0-26.9 kg/sq m - Primary    Relevant Orders    CBC & Differential    Comprehensive Metabolic Panel    Iron    TSH      Other Visit Diagnoses     Overweight (BMI 25.0-29.9)        Relevant Orders    CBC & Differential    Comprehensive Metabolic Panel    Iron    TSH           New Medications Ordered This Visit   Medications   • phentermine 37.5 MG capsule     Sig: Take 1 capsule by mouth Every Morning.     Dispense:  30 capsule     Refill:  0       It's not just what you eat, but when you eat  Eat breakfast, and eat smaller meals throughout the day. A healthy breakfast can jumpstart your metabolism, while eating small, healthy meals (rather than the standard three large meals) keeps your energy up.   Avoid eating at night. Try to eat dinner earlier and fast for 14-16 hours until breakfast the next morning. Studies suggest that eating only when you’re most active and giving your digestive system a long break each day may help to regulate weight.     Patient understands the risks associated with this controlled medication, including tolerance and addiction.  she also agrees to only obtain this medication from me, and not from a another provider, unless that provider is covering for me in my absence.  she also agrees to be compliant in dosing, and not self adjust the dose of medication.  A signed controlled substance agreement is on file, and she has received a controlled substance education sheet at this a previous visit.  she has also signed a consent for treatment with a controlled substance as per [facility] policy. YANCY was obtained.    Last tsh and ekg was good-receck as directed-increase water and exercise       Recheck labs before next visit

## 2019-11-25 ENCOUNTER — CLINICAL SUPPORT (OUTPATIENT)
Dept: FAMILY MEDICINE CLINIC | Facility: CLINIC | Age: 34
End: 2019-11-25

## 2019-11-25 DIAGNOSIS — Z23 NEED FOR VACCINATION: Primary | ICD-10-CM

## 2019-11-25 PROCEDURE — 90674 CCIIV4 VAC NO PRSV 0.5 ML IM: CPT | Performed by: NURSE PRACTITIONER

## 2019-11-25 PROCEDURE — 90471 IMMUNIZATION ADMIN: CPT | Performed by: NURSE PRACTITIONER

## 2020-02-26 RX ORDER — CLARITHROMYCIN 500 MG/1
500 TABLET, COATED ORAL 2 TIMES DAILY
Qty: 20 TABLET | Refills: 0 | Status: SHIPPED | OUTPATIENT
Start: 2020-02-26 | End: 2020-07-27

## 2020-02-29 RX ORDER — BENZONATATE 100 MG/1
200 CAPSULE ORAL 3 TIMES DAILY PRN
Qty: 30 CAPSULE | Refills: 1 | Status: SHIPPED | OUTPATIENT
Start: 2020-02-29 | End: 2020-07-27

## 2020-07-27 ENCOUNTER — OFFICE VISIT (OUTPATIENT)
Dept: FAMILY MEDICINE CLINIC | Facility: CLINIC | Age: 35
End: 2020-07-27

## 2020-07-27 VITALS
HEART RATE: 76 BPM | HEIGHT: 67 IN | SYSTOLIC BLOOD PRESSURE: 120 MMHG | DIASTOLIC BLOOD PRESSURE: 84 MMHG | BODY MASS INDEX: 28.09 KG/M2 | WEIGHT: 179 LBS

## 2020-07-27 DIAGNOSIS — R53.81 MALAISE: ICD-10-CM

## 2020-07-27 DIAGNOSIS — E66.3 OVERWEIGHT: Primary | ICD-10-CM

## 2020-07-27 PROCEDURE — 99442 PR PHYS/QHP TELEPHONE EVALUATION 11-20 MIN: CPT | Performed by: NURSE PRACTITIONER

## 2020-07-27 RX ORDER — PHENTERMINE HYDROCHLORIDE 37.5 MG/1
37.5 CAPSULE ORAL EVERY MORNING
Qty: 30 CAPSULE | Refills: 0 | Status: SHIPPED | OUTPATIENT
Start: 2020-07-27 | End: 2020-08-24 | Stop reason: SDUPTHER

## 2020-07-27 NOTE — PROGRESS NOTES
No chief complaint on file.    Subjective   Christie Griffith is a 35 y.o. female.     Presents with weight check -, more exercise, could not tolerate belviq, insurance would not cover saxenda, gaining weight, family hx of dm-both mother and father-patient has hypoglycemia -telephone visit due to covid   Obesity   This is a recurrent problem. The current episode started more than 1 month ago. The problem occurs intermittently. The problem has been waxing and waning. Associated symptoms include fatigue. Pertinent negatives include no abdominal pain, anorexia, arthralgias, change in bowel habit, chest pain, chills, congestion, coughing, diaphoresis, fever, headaches, joint swelling, myalgias, nausea, neck pain, numbness, rash, sore throat, vomiting or weakness. Treatments tried: belviq, saxenda, wellbutrin  The treatment provided mild relief.   Fatigue   This is a recurrent problem. The current episode started more than 1 month ago. The problem occurs intermittently. The problem has been gradually worsening. Associated symptoms include fatigue. Pertinent negatives include no abdominal pain, anorexia, arthralgias, change in bowel habit, chest pain, chills, congestion, coughing, diaphoresis, fever, headaches, joint swelling, myalgias, nausea, neck pain, numbness, rash, sore throat, vomiting or weakness. She has tried rest and relaxation for the symptoms. The treatment provided mild relief.        The following portions of the patient's history were reviewed and updated as appropriate: allergies, current medications, past social history and problem list.    Review of Systems   Constitutional: Positive for activity change and fatigue. Negative for appetite change, chills, diaphoresis, fever and unexpected weight change.        Low calorie, low carb diet , increased exercise and water    HENT: Negative for congestion, dental problem, drooling, ear discharge, ear pain, facial swelling, hearing loss, mouth sores, nosebleeds,  "postnasal drip and sore throat.    Eyes: Negative.  Negative for photophobia, pain, discharge, redness, itching and visual disturbance.   Respiratory: Negative.  Negative for apnea, cough, choking, chest tightness, shortness of breath, wheezing and stridor.    Cardiovascular: Negative.  Negative for chest pain, palpitations and leg swelling.   Gastrointestinal: Negative for abdominal distention, abdominal pain, anal bleeding, anorexia, blood in stool, change in bowel habit, constipation, diarrhea, nausea and vomiting.   Endocrine: Negative.  Negative for cold intolerance, heat intolerance, polydipsia, polyphagia and polyuria.   Genitourinary: Negative.  Negative for difficulty urinating and dyspareunia.   Musculoskeletal: Negative.  Negative for arthralgias, back pain, gait problem, joint swelling, myalgias, neck pain and neck stiffness.   Skin: Negative.  Negative for color change, pallor and rash.   Allergic/Immunologic: Negative.  Negative for environmental allergies, food allergies and immunocompromised state.   Neurological: Negative.  Negative for dizziness, tremors, seizures, syncope, facial asymmetry, speech difficulty, weakness, light-headedness, numbness and headaches.   Hematological: Negative.  Negative for adenopathy. Does not bruise/bleed easily.   Psychiatric/Behavioral: Negative.  Negative for agitation, behavioral problems, confusion, decreased concentration, dysphoric mood, hallucinations, self-injury, sleep disturbance and suicidal ideas. The patient is not nervous/anxious and is not hyperactive.        Objective   /84   Pulse 76   Ht 170.2 cm (67\")   Wt 81.2 kg (179 lb)   BMI 28.04 kg/m²   Physical Exam   Constitutional: She appears well-developed and well-nourished.   Limited pe due to telemedicine    Nursing note and vitals reviewed.      Assessment/Plan   Problem List Items Addressed This Visit        Other    Malaise    Relevant Medications    phentermine 37.5 MG capsule    Other " Relevant Orders    CBC & Differential (Completed)    Comprehensive Metabolic Panel (Completed)    TSH (Completed)    CBC Auto Differential (Completed)    Overweight - Primary    Relevant Medications    phentermine 37.5 MG capsule           New Medications Ordered This Visit   Medications   • phentermine 37.5 MG capsule     Sig: Take 1 capsule by mouth Every Morning.     Dispense:  30 capsule     Refill:  0       Patient understands the risks associated with this controlled medication, including tolerance and addiction.  she also agrees to only obtain this medication from me, and not from a another provider, unless that provider is covering for me in my absence.  she also agrees to be compliant in dosing, and not self adjust the dose of medication.  A signed controlled substance agreement is on file, and she has received a controlled substance education sheet at this a previous visit.  she has also signed a consent for treatment with a controlled substance as per [facility] policy. YANCY was obtained.    See back in 4 weeks as directed    This visit has been rescheduled as a phone visit to comply with patient safety concerns in accordance with CDC recommendations. Total time of discussion was 12 minutes.    You have chosen to receive care through a telephone visit. Do you consent to use a telephone visit for your medical care today? Yes

## 2020-07-30 ENCOUNTER — LAB (OUTPATIENT)
Dept: LAB | Facility: HOSPITAL | Age: 35
End: 2020-07-30

## 2020-07-30 LAB
ALBUMIN SERPL-MCNC: 4.9 G/DL (ref 3.5–5.2)
ALBUMIN/GLOB SERPL: 1.8 G/DL
ALP SERPL-CCNC: 67 U/L (ref 39–117)
ALT SERPL W P-5'-P-CCNC: 21 U/L (ref 1–33)
ANION GAP SERPL CALCULATED.3IONS-SCNC: 13.3 MMOL/L (ref 5–15)
AST SERPL-CCNC: 22 U/L (ref 1–32)
BASOPHILS # BLD AUTO: 0.07 10*3/MM3 (ref 0–0.2)
BASOPHILS NFR BLD AUTO: 1.2 % (ref 0–1.5)
BILIRUB SERPL-MCNC: 1.8 MG/DL (ref 0–1.2)
BUN SERPL-MCNC: 13 MG/DL (ref 6–20)
BUN/CREAT SERPL: 16.7 (ref 7–25)
CALCIUM SPEC-SCNC: 9.8 MG/DL (ref 8.6–10.5)
CHLORIDE SERPL-SCNC: 101 MMOL/L (ref 98–107)
CO2 SERPL-SCNC: 23.7 MMOL/L (ref 22–29)
CREAT SERPL-MCNC: 0.78 MG/DL (ref 0.57–1)
DEPRECATED RDW RBC AUTO: 41 FL (ref 37–54)
EOSINOPHIL # BLD AUTO: 0.08 10*3/MM3 (ref 0–0.4)
EOSINOPHIL NFR BLD AUTO: 1.3 % (ref 0.3–6.2)
ERYTHROCYTE [DISTWIDTH] IN BLOOD BY AUTOMATED COUNT: 13 % (ref 12.3–15.4)
GFR SERPL CREATININE-BSD FRML MDRD: 84 ML/MIN/1.73
GLOBULIN UR ELPH-MCNC: 2.8 GM/DL
GLUCOSE SERPL-MCNC: 81 MG/DL (ref 65–99)
HCT VFR BLD AUTO: 40.8 % (ref 34–46.6)
HGB BLD-MCNC: 14.5 G/DL (ref 12–15.9)
IMM GRANULOCYTES # BLD AUTO: 0.01 10*3/MM3 (ref 0–0.05)
IMM GRANULOCYTES NFR BLD AUTO: 0.2 % (ref 0–0.5)
IRON 24H UR-MRATE: 110 MCG/DL (ref 37–145)
LYMPHOCYTES # BLD AUTO: 1.8 10*3/MM3 (ref 0.7–3.1)
LYMPHOCYTES NFR BLD AUTO: 29.7 % (ref 19.6–45.3)
MCH RBC QN AUTO: 31.3 PG (ref 26.6–33)
MCHC RBC AUTO-ENTMCNC: 35.5 G/DL (ref 31.5–35.7)
MCV RBC AUTO: 87.9 FL (ref 79–97)
MONOCYTES # BLD AUTO: 0.5 10*3/MM3 (ref 0.1–0.9)
MONOCYTES NFR BLD AUTO: 8.3 % (ref 5–12)
NEUTROPHILS NFR BLD AUTO: 3.6 10*3/MM3 (ref 1.7–7)
NEUTROPHILS NFR BLD AUTO: 59.3 % (ref 42.7–76)
NRBC BLD AUTO-RTO: 0 /100 WBC (ref 0–0.2)
PLATELET # BLD AUTO: 332 10*3/MM3 (ref 140–450)
PMV BLD AUTO: 11.5 FL (ref 6–12)
POTASSIUM SERPL-SCNC: 4 MMOL/L (ref 3.5–5.2)
PROT SERPL-MCNC: 7.7 G/DL (ref 6–8.5)
RBC # BLD AUTO: 4.64 10*6/MM3 (ref 3.77–5.28)
SODIUM SERPL-SCNC: 138 MMOL/L (ref 136–145)
TSH SERPL DL<=0.05 MIU/L-ACNC: 0.8 UIU/ML (ref 0.27–4.2)
WBC # BLD AUTO: 6.06 10*3/MM3 (ref 3.4–10.8)

## 2020-07-30 PROCEDURE — 36415 COLL VENOUS BLD VENIPUNCTURE: CPT | Performed by: NURSE PRACTITIONER

## 2020-07-30 PROCEDURE — 84443 ASSAY THYROID STIM HORMONE: CPT | Performed by: NURSE PRACTITIONER

## 2020-07-30 PROCEDURE — 80053 COMPREHEN METABOLIC PANEL: CPT | Performed by: NURSE PRACTITIONER

## 2020-07-30 PROCEDURE — 83540 ASSAY OF IRON: CPT | Performed by: NURSE PRACTITIONER

## 2020-07-30 PROCEDURE — 85025 COMPLETE CBC W/AUTO DIFF WBC: CPT | Performed by: NURSE PRACTITIONER

## 2020-08-24 ENCOUNTER — OFFICE VISIT (OUTPATIENT)
Dept: FAMILY MEDICINE CLINIC | Facility: CLINIC | Age: 35
End: 2020-08-24

## 2020-08-24 VITALS
HEART RATE: 82 BPM | SYSTOLIC BLOOD PRESSURE: 120 MMHG | TEMPERATURE: 99.1 F | BODY MASS INDEX: 27.15 KG/M2 | DIASTOLIC BLOOD PRESSURE: 88 MMHG | WEIGHT: 173 LBS | HEIGHT: 67 IN

## 2020-08-24 DIAGNOSIS — R53.83 MALAISE AND FATIGUE: Primary | ICD-10-CM

## 2020-08-24 DIAGNOSIS — R53.81 MALAISE AND FATIGUE: Primary | ICD-10-CM

## 2020-08-24 DIAGNOSIS — E66.3 OVERWEIGHT: ICD-10-CM

## 2020-08-24 DIAGNOSIS — R53.81 MALAISE: ICD-10-CM

## 2020-08-24 PROCEDURE — 99213 OFFICE O/P EST LOW 20 MIN: CPT | Performed by: NURSE PRACTITIONER

## 2020-08-24 RX ORDER — PHENTERMINE HYDROCHLORIDE 37.5 MG/1
37.5 CAPSULE ORAL EVERY MORNING
Qty: 30 CAPSULE | Refills: 0 | Status: SHIPPED | OUTPATIENT
Start: 2020-08-24 | End: 2020-10-22

## 2020-08-24 NOTE — PROGRESS NOTES
Chief Complaint   Patient presents with   • Weight Check     Subjective   Christie Griffith is a 35 y.o. female.     Presents with weight check -, more exercise, could not tolerate belviq, insurance would not cover saxenda, gaining weight, family hx of dm-both mother and father-patient has hypoglycemia     Obesity   This is a recurrent problem. The current episode started more than 1 month ago. The problem occurs intermittently. The problem has been waxing and waning. Associated symptoms include fatigue. Pertinent negatives include no abdominal pain, anorexia, arthralgias, change in bowel habit, chest pain, chills, congestion, coughing, diaphoresis, fever, headaches, joint swelling, myalgias, nausea, neck pain, numbness, rash, sore throat, swollen glands, urinary symptoms, vertigo, visual change, vomiting or weakness. Treatments tried: belviq, saxenda, wellbutrin  The treatment provided mild relief.   Fatigue   This is a recurrent problem. The current episode started more than 1 month ago. The problem occurs intermittently. The problem has been gradually worsening. Associated symptoms include fatigue. Pertinent negatives include no abdominal pain, anorexia, arthralgias, change in bowel habit, chest pain, chills, congestion, coughing, diaphoresis, fever, headaches, joint swelling, myalgias, nausea, neck pain, numbness, rash, sore throat, swollen glands, urinary symptoms, vertigo, visual change, vomiting or weakness. She has tried rest and relaxation for the symptoms. The treatment provided mild relief.        The following portions of the patient's history were reviewed and updated as appropriate: allergies, current medications, past social history and problem list.    Review of Systems   Constitutional: Positive for activity change and fatigue. Negative for appetite change, chills, diaphoresis, fever and unexpected weight change.        Low calorie, low carb diet , increased exercise and water    HENT: Negative.   "Negative for congestion, dental problem, drooling, ear discharge, ear pain, facial swelling, hearing loss, sinus pressure, sinus pain, sneezing and sore throat.    Eyes: Negative.  Negative for photophobia, pain, discharge, redness, itching and visual disturbance.   Respiratory: Negative.  Negative for apnea, cough, choking, chest tightness, shortness of breath, wheezing and stridor.    Cardiovascular: Negative.  Negative for chest pain, palpitations and leg swelling.   Gastrointestinal: Negative for abdominal distention, abdominal pain, anal bleeding, anorexia, blood in stool, change in bowel habit, constipation, diarrhea, nausea, rectal pain and vomiting.   Endocrine: Negative.  Negative for cold intolerance, heat intolerance, polydipsia, polyphagia and polyuria.   Genitourinary: Negative.  Negative for difficulty urinating and dyspareunia.   Musculoskeletal: Negative.  Negative for arthralgias, back pain, gait problem, joint swelling, myalgias, neck pain and neck stiffness.   Skin: Negative.  Negative for color change, pallor and rash.   Allergic/Immunologic: Negative.  Negative for environmental allergies, food allergies and immunocompromised state.   Neurological: Negative.  Negative for dizziness, vertigo, tremors, seizures, syncope, facial asymmetry, speech difficulty, weakness, light-headedness, numbness and headaches.   Hematological: Negative.  Negative for adenopathy. Does not bruise/bleed easily.   Psychiatric/Behavioral: Negative.  Negative for agitation, behavioral problems, confusion, decreased concentration, dysphoric mood, hallucinations, self-injury, sleep disturbance and suicidal ideas. The patient is not nervous/anxious and is not hyperactive.        Objective   /88   Pulse 82   Temp 99.1 °F (37.3 °C) (Tympanic)   Ht 170.2 cm (67\")   Wt 78.5 kg (173 lb)   BMI 27.10 kg/m²   Physical Exam   Constitutional: She is oriented to person, place, and time. She appears well-developed and " well-nourished. No distress.   HENT:   Head: Normocephalic and atraumatic.   Right Ear: External ear normal.   Left Ear: External ear normal.   Mouth/Throat: Oropharynx is clear and moist. No oropharyngeal exudate.   Eyes: Pupils are equal, round, and reactive to light. EOM are normal. Right eye exhibits no discharge. Left eye exhibits no discharge. No scleral icterus.   Neck: Normal range of motion. Neck supple. No JVD present. No tracheal deviation present. No thyromegaly present.   Cardiovascular: Normal rate, regular rhythm, normal heart sounds and intact distal pulses. Exam reveals no gallop and no friction rub.   No murmur heard.  Pulmonary/Chest: Effort normal and breath sounds normal. No stridor. No respiratory distress. She has no wheezes. She has no rales. She exhibits no tenderness.   Abdominal: Soft. Bowel sounds are normal. She exhibits no distension and no mass. There is no tenderness. There is no rebound and no guarding. No hernia.   Musculoskeletal: Normal range of motion. She exhibits no edema, tenderness or deformity.   Lymphadenopathy:     She has no cervical adenopathy.   Neurological: She is alert and oriented to person, place, and time. She displays normal reflexes. No cranial nerve deficit or sensory deficit. She exhibits normal muscle tone. Coordination normal.   Skin: Skin is warm and dry. No rash noted. She is not diaphoretic. No erythema. No pallor.   Nursing note and vitals reviewed.      Assessment/Plan   Problem List Items Addressed This Visit        Other    Malaise and fatigue - Primary    Relevant Medications    phentermine 37.5 MG capsule    Overweight           New Medications Ordered This Visit   Medications   • phentermine 37.5 MG capsule     Sig: Take 1 capsule by mouth Every Morning.     Dispense:  30 capsule     Refill:  0       Patient understands the risks associated with this controlled medication, including tolerance and addiction.  she also agrees to only obtain this  medication from me, and not from a another provider, unless that provider is covering for me in my absence.  she also agrees to be compliant in dosing, and not self adjust the dose of medication.  A signed controlled substance agreement is on file, and she has received a controlled substance education sheet at this a previous visit.  she has also signed a consent for treatment with a controlled substance as per [facility] policy. YANCY was obtained.      It's not just what you eat, but when you eat  Eat breakfast, and eat smaller meals throughout the day. A healthy breakfast can jumpstart your metabolism, while eating small, healthy meals (rather than the standard three large meals) keeps your energy up.   Avoid eating at night. Try to eat dinner earlier and fast for 14-16 hours until breakfast the next morning. Studies suggest that eating only when you’re most active and giving your digestive system a long break each day may help to regulate weight.     Diet discussed, meds reviewed, see back in 6-8 weeks,  Monitor blood pressure, thyroid utd and wnl

## 2020-10-22 ENCOUNTER — OFFICE VISIT (OUTPATIENT)
Dept: FAMILY MEDICINE CLINIC | Facility: CLINIC | Age: 35
End: 2020-10-22

## 2020-10-22 VITALS
DIASTOLIC BLOOD PRESSURE: 80 MMHG | HEIGHT: 67 IN | WEIGHT: 165 LBS | SYSTOLIC BLOOD PRESSURE: 120 MMHG | BODY MASS INDEX: 25.9 KG/M2 | TEMPERATURE: 98.6 F

## 2020-10-22 DIAGNOSIS — R53.81 MALAISE: Primary | ICD-10-CM

## 2020-10-22 DIAGNOSIS — E66.3 OVERWEIGHT: ICD-10-CM

## 2020-10-22 DIAGNOSIS — Z00.00 GENERAL MEDICAL EXAM: ICD-10-CM

## 2020-10-22 PROCEDURE — 99213 OFFICE O/P EST LOW 20 MIN: CPT | Performed by: NURSE PRACTITIONER

## 2020-10-22 PROCEDURE — 90686 IIV4 VACC NO PRSV 0.5 ML IM: CPT | Performed by: NURSE PRACTITIONER

## 2020-10-22 PROCEDURE — 90471 IMMUNIZATION ADMIN: CPT | Performed by: NURSE PRACTITIONER

## 2020-10-22 RX ORDER — BUPROPION HYDROCHLORIDE 75 MG/1
75 TABLET ORAL DAILY
Qty: 30 TABLET | Refills: 11 | Status: SHIPPED | OUTPATIENT
Start: 2020-10-22 | End: 2021-09-01 | Stop reason: SDUPTHER

## 2020-10-22 NOTE — PROGRESS NOTES
Chief Complaint   Patient presents with   • Weight Check   • Med Refill   • Flu Vaccine     Subjective   Christie Griffith is a 35 y.o. female.     Presents with weight check -, more exercise, could not tolerate belviq, insurance would not cover saxenda, gaining weight, family hx of dm-both mother and father-patient has hypoglycemia     Obesity  This is a recurrent problem. The current episode started more than 1 month ago. The problem occurs intermittently. The problem has been waxing and waning. Associated symptoms include fatigue. Pertinent negatives include no abdominal pain, arthralgias, chest pain, chills, congestion, diaphoresis, fever, joint swelling, myalgias, nausea, neck pain, rash, swollen glands, urinary symptoms, visual change or weakness. Treatments tried: belviq, saxenda, wellbutrin  The treatment provided mild relief.   Fatigue  This is a recurrent problem. The current episode started more than 1 month ago. The problem occurs intermittently. The problem has been gradually worsening. Associated symptoms include fatigue. Pertinent negatives include no abdominal pain, arthralgias, chest pain, chills, congestion, diaphoresis, fever, joint swelling, myalgias, nausea, neck pain, rash, swollen glands, urinary symptoms, visual change or weakness. She has tried rest and relaxation for the symptoms. The treatment provided mild relief.        The following portions of the patient's history were reviewed and updated as appropriate: allergies, current medications, past social history and problem list.    Review of Systems   Constitutional: Positive for activity change, appetite change, fatigue and unexpected weight change. Negative for chills, diaphoresis and fever.   HENT: Negative.  Negative for congestion.    Eyes: Negative.  Negative for photophobia, pain, itching and visual disturbance.   Respiratory: Negative.  Negative for apnea, wheezing and stridor.    Cardiovascular: Negative.  Negative for chest pain,  "palpitations and leg swelling.   Gastrointestinal: Negative.  Negative for abdominal distention, abdominal pain, anal bleeding, blood in stool, constipation, diarrhea and nausea.   Endocrine: Negative.  Negative for polydipsia and polyphagia.   Genitourinary: Negative.    Musculoskeletal: Negative.  Negative for arthralgias, back pain, gait problem, joint swelling, myalgias and neck pain.   Skin: Negative.  Negative for rash.   Allergic/Immunologic: Negative.  Negative for food allergies and immunocompromised state.   Neurological: Negative.  Negative for tremors, seizures, syncope, speech difficulty and weakness.   Hematological: Negative.  Negative for adenopathy. Does not bruise/bleed easily.   Psychiatric/Behavioral: Positive for sleep disturbance. Negative for agitation, behavioral problems, confusion, decreased concentration, dysphoric mood, hallucinations, self-injury and suicidal ideas. The patient is nervous/anxious. The patient is not hyperactive.        Objective   /80   Temp 98.6 °F (37 °C) (Tympanic)   Ht 170.2 cm (67\")   Wt 74.8 kg (165 lb)   BMI 25.84 kg/m²   Physical Exam  Vitals signs and nursing note reviewed.   Constitutional:       Appearance: Normal appearance.   HENT:      Head: Normocephalic and atraumatic.      Right Ear: Tympanic membrane normal.      Nose: Nose normal.      Mouth/Throat:      Mouth: Mucous membranes are dry.   Eyes:      Pupils: Pupils are equal, round, and reactive to light.   Neck:      Musculoskeletal: Normal range of motion.   Cardiovascular:      Rate and Rhythm: Normal rate.      Pulses: Normal pulses.      Heart sounds: No murmur. No gallop.    Pulmonary:      Effort: Pulmonary effort is normal.      Breath sounds: Normal breath sounds.   Abdominal:      General: Abdomen is flat.      Palpations: Abdomen is soft.   Skin:     General: Skin is warm and dry.   Neurological:      General: No focal deficit present.      Mental Status: She is alert and oriented " to person, place, and time.      Cranial Nerves: No cranial nerve deficit.      Sensory: No sensory deficit.      Motor: No weakness.      Coordination: Coordination normal.      Gait: Gait normal.      Deep Tendon Reflexes: Reflexes normal.         Assessment/Plan   Problems Addressed this Visit        Other    Overweight      Other Visit Diagnoses     Malaise    -  Primary    General medical exam          Diagnoses       Codes Comments    Malaise    -  Primary ICD-10-CM: R53.81  ICD-9-CM: 780.79     General medical exam     ICD-10-CM: Z00.00  ICD-9-CM: V70.9     Overweight     ICD-10-CM: E66.3  ICD-9-CM: 278.02            New Medications Ordered This Visit   Medications   • buPROPion (WELLBUTRIN) 75 MG tablet     Sig: Take 1 tablet by mouth Daily.     Dispense:  30 tablet     Refill:  11       It's not just what you eat, but when you eat  Eat breakfast, and eat smaller meals throughout the day. A healthy breakfast can jumpstart your metabolism, while eating small, healthy meals (rather than the standard three large meals) keeps your energy up.   Avoid eating at night. Try to eat dinner earlier and fast for 14-16 hours until breakfast the next morning. Studies suggest that eating only when you’re most active and giving your digestive system a long break each day may help to regulate weight.     Add wellbutrin as directed for potential weight loss and help with anxiety , labs as directed, update flu vaccine

## 2021-04-19 RX ORDER — SCOLOPAMINE TRANSDERMAL SYSTEM 1 MG/1
1 PATCH, EXTENDED RELEASE TRANSDERMAL
Qty: 4 EACH | Refills: 1 | Status: SHIPPED | OUTPATIENT
Start: 2021-04-19 | End: 2021-10-28

## 2021-07-14 RX ORDER — ONDANSETRON 4 MG/1
4 TABLET, FILM COATED ORAL EVERY 8 HOURS PRN
Qty: 30 TABLET | Refills: 1 | Status: SHIPPED | OUTPATIENT
Start: 2021-07-14 | End: 2021-10-28

## 2021-07-14 RX ORDER — CIPROFLOXACIN 500 MG/1
500 TABLET, FILM COATED ORAL 2 TIMES DAILY
Qty: 28 TABLET | Refills: 0 | Status: SHIPPED | OUTPATIENT
Start: 2021-07-14 | End: 2021-10-28

## 2021-09-01 ENCOUNTER — DOCUMENTATION (OUTPATIENT)
Dept: FAMILY MEDICINE CLINIC | Facility: CLINIC | Age: 36
End: 2021-09-01

## 2021-09-01 RX ORDER — BUPROPION HYDROCHLORIDE 75 MG/1
75 TABLET ORAL DAILY
Qty: 30 TABLET | Refills: 11 | Status: SHIPPED | OUTPATIENT
Start: 2021-09-01 | End: 2022-09-06

## 2021-10-28 ENCOUNTER — OFFICE VISIT (OUTPATIENT)
Dept: FAMILY MEDICINE CLINIC | Facility: CLINIC | Age: 36
End: 2021-10-28

## 2021-10-28 VITALS
HEART RATE: 73 BPM | TEMPERATURE: 97.8 F | SYSTOLIC BLOOD PRESSURE: 130 MMHG | WEIGHT: 181 LBS | OXYGEN SATURATION: 99 % | BODY MASS INDEX: 28.41 KG/M2 | HEIGHT: 67 IN | DIASTOLIC BLOOD PRESSURE: 80 MMHG

## 2021-10-28 DIAGNOSIS — R53.81 MALAISE AND FATIGUE: ICD-10-CM

## 2021-10-28 DIAGNOSIS — E66.3 OVERWEIGHT: Primary | ICD-10-CM

## 2021-10-28 DIAGNOSIS — R53.83 MALAISE AND FATIGUE: ICD-10-CM

## 2021-10-28 PROCEDURE — 99213 OFFICE O/P EST LOW 20 MIN: CPT | Performed by: NURSE PRACTITIONER

## 2021-10-28 PROCEDURE — 93010 ELECTROCARDIOGRAM REPORT: CPT | Performed by: INTERNAL MEDICINE

## 2021-10-28 PROCEDURE — 93005 ELECTROCARDIOGRAM TRACING: CPT | Performed by: NURSE PRACTITIONER

## 2021-10-28 RX ORDER — PHENTERMINE HYDROCHLORIDE 30 MG/1
30 CAPSULE ORAL EVERY MORNING
Qty: 30 CAPSULE | Refills: 0 | Status: SHIPPED | OUTPATIENT
Start: 2021-10-28 | End: 2022-07-19

## 2021-10-28 RX ORDER — PHENTERMINE HYDROCHLORIDE 37.5 MG/1
37.5 CAPSULE ORAL EVERY MORNING
Qty: 30 CAPSULE | Refills: 0 | Status: CANCELLED | OUTPATIENT
Start: 2021-10-28

## 2021-10-28 NOTE — PROGRESS NOTES
Chief Complaint   Patient presents with   • Weight Gain   • SOB upon exer     Subjective   Christie Griffith is a 36 y.o. female.           Presents with weight check -, more exercise, could not tolerate belviq, insurance would not cover saxenda, gaining weight, family hx of dm-both mother and father-patient has hypoglycemia     Obesity  This is a recurrent problem. The current episode started more than 1 month ago. The problem occurs intermittently. The problem has been waxing and waning. Associated symptoms include fatigue. Pertinent negatives include no abdominal pain, anorexia, arthralgias, change in bowel habit, chest pain, chills, congestion, coughing, diaphoresis, fever, headaches, joint swelling, myalgias, nausea, neck pain, numbness, rash, sore throat, vomiting or weakness. Treatments tried: belviq, saxenda, wellbutrin  The treatment provided mild relief.   Fatigue  This is a recurrent problem. The current episode started more than 1 month ago. The problem occurs intermittently. The problem has been gradually worsening. Associated symptoms include fatigue. Pertinent negatives include no abdominal pain, anorexia, arthralgias, change in bowel habit, chest pain, chills, congestion, coughing, diaphoresis, fever, headaches, joint swelling, myalgias, nausea, neck pain, numbness, rash, sore throat, vomiting or weakness. She has tried rest and relaxation for the symptoms. The treatment provided mild relief.        The following portions of the patient's history were reviewed and updated as appropriate: allergies, current medications, past social history and problem list.    Review of Systems   Constitutional: Positive for activity change, fatigue and unexpected weight change. Negative for appetite change, chills, diaphoresis and fever.        Low calorie, low carb diet , increased exercise and water    HENT: Negative for congestion, dental problem, drooling, ear discharge, ear pain, facial swelling, hearing loss,  "mouth sores, nosebleeds, postnasal drip, rhinorrhea, sinus pain and sore throat.    Eyes: Negative.  Negative for photophobia, pain, discharge, redness, itching and visual disturbance.   Respiratory: Negative.  Negative for apnea, cough, choking, chest tightness, shortness of breath, wheezing and stridor.    Cardiovascular: Negative.  Negative for chest pain, palpitations and leg swelling.   Gastrointestinal: Negative for abdominal distention, abdominal pain, anal bleeding, anorexia, blood in stool, change in bowel habit, constipation, diarrhea, nausea and vomiting.   Endocrine: Negative.  Negative for cold intolerance, heat intolerance, polydipsia, polyphagia and polyuria.   Genitourinary: Negative.  Negative for difficulty urinating, dyspareunia, dysuria and enuresis.   Musculoskeletal: Negative.  Negative for arthralgias, back pain, gait problem, joint swelling, myalgias, neck pain and neck stiffness.   Skin: Negative.  Negative for color change, pallor and rash.   Allergic/Immunologic: Negative.  Negative for environmental allergies, food allergies and immunocompromised state.   Neurological: Negative.  Negative for dizziness, tremors, seizures, syncope, facial asymmetry, speech difficulty, weakness, light-headedness, numbness and headaches.   Hematological: Negative.  Negative for adenopathy. Does not bruise/bleed easily.   Psychiatric/Behavioral: Negative.  Negative for agitation, behavioral problems, confusion, decreased concentration, dysphoric mood, hallucinations, self-injury, sleep disturbance and suicidal ideas. The patient is not nervous/anxious and is not hyperactive.        Objective   /80   Pulse 73   Temp 97.8 °F (36.6 °C) (Temporal)   Ht 170.2 cm (67\")   Wt 82.1 kg (181 lb)   SpO2 99%   BMI 28.35 kg/m²   Physical Exam  Vitals and nursing note reviewed.   Constitutional:       General: She is not in acute distress.     Appearance: Normal appearance. She is not ill-appearing, " toxic-appearing or diaphoretic.   HENT:      Head: Normocephalic and atraumatic.      Right Ear: Tympanic membrane normal.      Nose: Nose normal.      Mouth/Throat:      Mouth: Mucous membranes are dry.   Eyes:      Pupils: Pupils are equal, round, and reactive to light.   Neck:      Vascular: No carotid bruit.   Cardiovascular:      Rate and Rhythm: Normal rate.      Pulses: Normal pulses.      Heart sounds: No murmur heard.  No gallop.    Pulmonary:      Effort: Pulmonary effort is normal.      Breath sounds: Normal breath sounds.   Abdominal:      General: Abdomen is flat. There is no distension.      Palpations: Abdomen is soft. There is no mass.      Tenderness: There is no abdominal tenderness.      Hernia: No hernia is present.   Musculoskeletal:      Cervical back: Normal range of motion. No rigidity or tenderness.   Lymphadenopathy:      Cervical: No cervical adenopathy.   Skin:     General: Skin is warm and dry.   Neurological:      General: No focal deficit present.      Mental Status: She is alert and oriented to person, place, and time.      Cranial Nerves: No cranial nerve deficit.      Sensory: No sensory deficit.      Motor: No weakness.      Coordination: Coordination normal.      Gait: Gait normal.      Deep Tendon Reflexes: Reflexes normal.   Psychiatric:         Mood and Affect: Mood normal.         Behavior: Behavior normal.              Assessment/Plan     Problems Addressed this Visit        Endocrine and Metabolic    Overweight - Primary    Relevant Medications    phentermine 30 MG capsule    Other Relevant Orders    ECG 12 Lead (Completed)    CBC & Differential    Comprehensive Metabolic Panel    Iron    TSH    Vitamin B12    Vitamin D 25 Hydroxy    Hepatitis C Antibody       Symptoms and Signs    Malaise and fatigue    Relevant Medications    phentermine 30 MG capsule    Other Relevant Orders    XR Chest PA & Lateral      Diagnoses       Codes Comments    Overweight    -  Primary  ICD-10-CM: E66.3  ICD-9-CM: 278.02     Malaise and fatigue     ICD-10-CM: R53.81, R53.83  ICD-9-CM: 780.79            New Medications Ordered This Visit   Medications   • phentermine 30 MG capsule     Sig: Take 1 capsule by mouth Every Morning.     Dispense:  30 capsule     Refill:  0     Current Outpatient Medications on File Prior to Visit   Medication Sig Dispense Refill   • buPROPion (WELLBUTRIN) 75 MG tablet Take 1 tablet by mouth Daily. 30 tablet 11   • [DISCONTINUED] ciprofloxacin (Cipro) 500 MG tablet Take 1 tablet by mouth 2 (Two) Times a Day. 28 tablet 0   • [DISCONTINUED] ondansetron (Zofran) 4 MG tablet Take 1 tablet by mouth Every 8 (Eight) Hours As Needed for Nausea or Vomiting. 30 tablet 1   • [DISCONTINUED] Scopolamine (Transderm-Scop, 1.5 MG,) 1.5 MG/3DAYS patch Place 1 patch on the skin as directed by provider Every 72 (Seventy-Two) Hours. 4 each 1     No current facility-administered medications on file prior to visit.       20 minutes   Follow Up   Return in about 4 weeks (around 11/25/2021).        It's not just what you eat, but when you eat  Eat breakfast, and eat smaller meals throughout the day. A healthy breakfast can jumpstart your metabolism, while eating small, healthy meals (rather than the standard three large meals) keeps your energy up.   Avoid eating at night. Try to eat dinner earlier and fast for 14-16 hours until breakfast the next morning. Studies suggest that eating only when you’re most active and giving your digestive system a long break each day may help to regulate weight.   Patient understands the risks associated with this controlled medication, including tolerance and addiction.  she also agrees to only obtain this medication from me, and not from a another provider, unless that provider is covering for me in my absence.  she also agrees to be compliant in dosing, and not self adjust the dose of medication.  A signed controlled substance agreement is on file, and she has  received a controlled substance education sheet at this a previous visit.  she has also signed a consent for treatment with a controlled substance as per [facility] policy. YANCY was obtained.      Answers for HPI/ROS submitted by the patient on 10/21/2021  Please describe your symptoms.: Weight Gain  Have you had these symptoms before?: Yes  How long have you been having these symptoms?: Greater than 2 weeks  Please list any medications you are currently taking for this condition.: NA  Please describe any probable cause for these symptoms. : Stress of life and job!!  What is the primary reason for your visit?: Other      Patient will try phenteramine for 3 months-ekg old changes nothing acute, labs today as directed   -labs and chest xray today -see back in 1 month as directed -patient agrees -monitor increase in heart rate -monitor blood pressure and carb intake

## 2021-10-31 LAB
QT INTERVAL: 400 MS
QTC INTERVAL: 432 MS

## 2021-11-03 ENCOUNTER — LAB (OUTPATIENT)
Dept: LAB | Facility: HOSPITAL | Age: 36
End: 2021-11-03

## 2021-11-03 PROCEDURE — 82306 VITAMIN D 25 HYDROXY: CPT | Performed by: NURSE PRACTITIONER

## 2021-11-03 PROCEDURE — 86803 HEPATITIS C AB TEST: CPT | Performed by: NURSE PRACTITIONER

## 2021-11-03 PROCEDURE — 84443 ASSAY THYROID STIM HORMONE: CPT | Performed by: NURSE PRACTITIONER

## 2021-11-03 PROCEDURE — 85025 COMPLETE CBC W/AUTO DIFF WBC: CPT | Performed by: NURSE PRACTITIONER

## 2021-11-03 PROCEDURE — 36415 COLL VENOUS BLD VENIPUNCTURE: CPT | Performed by: NURSE PRACTITIONER

## 2021-11-03 PROCEDURE — 83540 ASSAY OF IRON: CPT | Performed by: NURSE PRACTITIONER

## 2021-11-03 PROCEDURE — 82607 VITAMIN B-12: CPT | Performed by: NURSE PRACTITIONER

## 2021-11-03 PROCEDURE — 80053 COMPREHEN METABOLIC PANEL: CPT | Performed by: NURSE PRACTITIONER

## 2021-11-04 LAB
25(OH)D3 SERPL-MCNC: 33 NG/ML (ref 30–100)
ALBUMIN SERPL-MCNC: 5 G/DL (ref 3.5–5.2)
ALBUMIN/GLOB SERPL: 1.8 G/DL
ALP SERPL-CCNC: 78 U/L (ref 39–117)
ALT SERPL W P-5'-P-CCNC: 17 U/L (ref 1–33)
ANION GAP SERPL CALCULATED.3IONS-SCNC: 13.6 MMOL/L (ref 5–15)
AST SERPL-CCNC: 18 U/L (ref 1–32)
BASOPHILS # BLD AUTO: 0.1 10*3/MM3 (ref 0–0.2)
BASOPHILS NFR BLD AUTO: 1.2 % (ref 0–1.5)
BILIRUB SERPL-MCNC: 1 MG/DL (ref 0–1.2)
BUN SERPL-MCNC: 15 MG/DL (ref 6–20)
BUN/CREAT SERPL: 19.7 (ref 7–25)
CALCIUM SPEC-SCNC: 10 MG/DL (ref 8.6–10.5)
CHLORIDE SERPL-SCNC: 101 MMOL/L (ref 98–107)
CO2 SERPL-SCNC: 24.4 MMOL/L (ref 22–29)
CREAT SERPL-MCNC: 0.76 MG/DL (ref 0.57–1)
DEPRECATED RDW RBC AUTO: 40.5 FL (ref 37–54)
EOSINOPHIL # BLD AUTO: 0.17 10*3/MM3 (ref 0–0.4)
EOSINOPHIL NFR BLD AUTO: 2.1 % (ref 0.3–6.2)
ERYTHROCYTE [DISTWIDTH] IN BLOOD BY AUTOMATED COUNT: 12.5 % (ref 12.3–15.4)
GFR SERPL CREATININE-BSD FRML MDRD: 86 ML/MIN/1.73
GLOBULIN UR ELPH-MCNC: 2.8 GM/DL
GLUCOSE SERPL-MCNC: 111 MG/DL (ref 65–99)
HCT VFR BLD AUTO: 41.1 % (ref 34–46.6)
HCV AB SER DONR QL: NORMAL
HGB BLD-MCNC: 14.2 G/DL (ref 12–15.9)
IMM GRANULOCYTES # BLD AUTO: 0.02 10*3/MM3 (ref 0–0.05)
IMM GRANULOCYTES NFR BLD AUTO: 0.2 % (ref 0–0.5)
IRON 24H UR-MRATE: 59 MCG/DL (ref 37–145)
LYMPHOCYTES # BLD AUTO: 2.43 10*3/MM3 (ref 0.7–3.1)
LYMPHOCYTES NFR BLD AUTO: 29.8 % (ref 19.6–45.3)
MCH RBC QN AUTO: 30.7 PG (ref 26.6–33)
MCHC RBC AUTO-ENTMCNC: 34.5 G/DL (ref 31.5–35.7)
MCV RBC AUTO: 89 FL (ref 79–97)
MONOCYTES # BLD AUTO: 0.71 10*3/MM3 (ref 0.1–0.9)
MONOCYTES NFR BLD AUTO: 8.7 % (ref 5–12)
NEUTROPHILS NFR BLD AUTO: 4.72 10*3/MM3 (ref 1.7–7)
NEUTROPHILS NFR BLD AUTO: 58 % (ref 42.7–76)
NRBC BLD AUTO-RTO: 0 /100 WBC (ref 0–0.2)
PLATELET # BLD AUTO: 380 10*3/MM3 (ref 140–450)
PMV BLD AUTO: 11.2 FL (ref 6–12)
POTASSIUM SERPL-SCNC: 3.2 MMOL/L (ref 3.5–5.2)
PROT SERPL-MCNC: 7.8 G/DL (ref 6–8.5)
RBC # BLD AUTO: 4.62 10*6/MM3 (ref 3.77–5.28)
SODIUM SERPL-SCNC: 139 MMOL/L (ref 136–145)
TSH SERPL DL<=0.05 MIU/L-ACNC: 0.75 UIU/ML (ref 0.27–4.2)
VIT B12 BLD-MCNC: 704 PG/ML (ref 211–946)
WBC # BLD AUTO: 8.15 10*3/MM3 (ref 3.4–10.8)

## 2021-11-09 ENCOUNTER — OFFICE VISIT (OUTPATIENT)
Dept: OBSTETRICS AND GYNECOLOGY | Facility: CLINIC | Age: 36
End: 2021-11-09

## 2021-11-09 VITALS — BODY MASS INDEX: 28.51 KG/M2 | SYSTOLIC BLOOD PRESSURE: 118 MMHG | DIASTOLIC BLOOD PRESSURE: 74 MMHG | WEIGHT: 182 LBS

## 2021-11-09 DIAGNOSIS — N89.8 VAGINAL DISCHARGE: ICD-10-CM

## 2021-11-09 DIAGNOSIS — Z12.4 ENCOUNTER FOR PAPANICOLAOU SMEAR FOR CERVICAL CANCER SCREENING: ICD-10-CM

## 2021-11-09 DIAGNOSIS — Z30.09 UNWANTED FERTILITY: ICD-10-CM

## 2021-11-09 DIAGNOSIS — Z01.419 WOMEN'S ANNUAL ROUTINE GYNECOLOGICAL EXAMINATION: Primary | ICD-10-CM

## 2021-11-09 LAB
CANDIDA ALBICANS: NEGATIVE
GARDNERELLA VAGINALIS: NEGATIVE
T VAGINALIS DNA VAG QL PROBE+SIG AMP: NEGATIVE

## 2021-11-09 PROCEDURE — 87480 CANDIDA DNA DIR PROBE: CPT | Performed by: OBSTETRICS & GYNECOLOGY

## 2021-11-09 PROCEDURE — 87660 TRICHOMONAS VAGIN DIR PROBE: CPT | Performed by: OBSTETRICS & GYNECOLOGY

## 2021-11-09 PROCEDURE — 87510 GARDNER VAG DNA DIR PROBE: CPT | Performed by: OBSTETRICS & GYNECOLOGY

## 2021-11-09 PROCEDURE — 99385 PREV VISIT NEW AGE 18-39: CPT | Performed by: OBSTETRICS & GYNECOLOGY

## 2021-11-09 NOTE — PROGRESS NOTES
[facility]  Gynecology  Date of Service: 2021    CC: Annual well woman exam    HPI  Christie Griffith is a 36 y.o.  premenopausal female who presents for her annual well woman exam.  The patient has no complaints.  Denies any vaginal itching, burning, or irritation.  Denies any vaginal bleeding with the IUD in place.  Continues to be sexually active.  Denies any sexual dysfunction concerns.  Denies any urinary symptoms including dysuria, frequency, urgency, nocturia.  Report occasional stress urinary incontinence, not bothersome.    She exercises regularly: yes.  She wears her seat belt: yes.  She has concerns about domestic violence: no.  She has noticed changes in height: no.    She has a Mirena IUD in place since 2016.  She states that she would like to have another because she likes not having a period but she would also like to have permanent sterilization.  She is certain that she does not want to have any more children.    She has a lot of sweating in her groin that makes her self conscious.  She also has some vaginal discharge and odor occasionally.    She is a teacher at [email or web address].    ROS  Review of Systems   Constitutional: Positive for fatigue and unexpected weight change.   HENT: Negative.    Eyes: Negative.    Respiratory: Negative.    Cardiovascular: Negative.    Gastrointestinal: Negative.    Endocrine: Negative.    Genitourinary: Positive for vaginal discharge.   Musculoskeletal: Negative.    Skin: Negative.    Allergic/Immunologic: Negative.    Neurological: Negative.    Hematological: Negative.    Psychiatric/Behavioral: The patient is nervous/anxious.       HEALTH MAINTENANCE  Mammogram: N/A  Colonoscopy: N/A  DEXA scan: N/A  Pap smear: 2016    GYN HISTORY  Menarche: age 12  Menses: None  History of STIs: None  Last pap smear: 2016  Abnormal pap smear history: None  Contraception: Mirena IUD    OB HISTORY  OB History    Para Term   AB Living   1 1 1     1   SAB IAB Ectopic Molar Multiple Live Births             1      # Outcome Date GA Lbr Jake/2nd Weight Sex Delivery Anes PTL Lv   1 Term  38w0d  2948 g (6 lb 8 oz) F Vag-Spont   TARAH     PAST MEDICAL HISTORY  Past Medical History:   Diagnosis Date   • Generalized anxiety disorder    • PONV (postoperative nausea and vomiting)    • Psoriasis    • Varicella      PAST SURGICAL HISTORY  Past Surgical History:   Procedure Laterality Date   • BILATERAL BREAST REDUCTION     • MOUTH SURGERY      x 4 dental implants   • WISDOM TOOTH EXTRACTION       FAMILY HISTORY  Family History   Problem Relation Age of Onset   • Lymphoma Mother    • Diabetes Mother    • Heart disease Mother    • Coronary artery disease Father    • Atrial fibrillation Father    • Heart disease Father    • Thyroid disease Other    • Heart murmur Daughter      SOCIAL HISTORY  Social History     Socioeconomic History   • Marital status:    Tobacco Use   • Smoking status: Never Smoker   • Smokeless tobacco: Never Used   Substance and Sexual Activity   • Alcohol use: Not Currently     Alcohol/week: 0.0 standard drinks   • Drug use: Never   • Sexual activity: Yes     Birth control/protection: Implant     HOME MEDICATIONS  Prior to Admission medications    Medication Sig Start Date End Date Taking? Authorizing Provider   buPROPion (WELLBUTRIN) 75 MG tablet Take 1 tablet by mouth Daily. 21  Yes Becki Jean APRN   phentermine 30 MG capsule Take 1 capsule by mouth Every Morning. 10/28/21  Yes Becki Jean APRN     ALLERGIES  Allergies   Allergen Reactions   • Transderm-Scop [Scopolamine] Dizziness     PE  /74 (BP Location: Left arm, Patient Position: Sitting, Cuff Size: Adult)   Wt 82.6 kg (182 lb)   Breastfeeding No   BMI 28.51 kg/m²        General: Alert, healthy, no distress, well nourished and well developed   Neurologic: Alert, oriented to person, place, and time.  Gait normal.   Cranial nerves II-XII grossly intact.  HEENT: Normocephalic, atraumatic.  Extraocular muscles intact, pupils equal and reactive times two.    Neck: Supple, no adenopathy, thyroid normal size, non-tender, without nodularity, trachea midline   Breasts: Delined.  Lungs: Normal respiratory effort.  Clear to auscultation bilaterally.   Heart: Regular rate and rhythm, without murmer, rub or gallop.   Abdomen: Soft, non-tender, non-distended,no masses, no hepatosplenomegaly, no hernia.    Skin: No rash, no lesions.  Extremities: No cyanosis, clubbing or edema  PELVIC EXAM:  External Genitalia/Vulva: Anatomy is normal, no significant redness of labia, no discharge on vulvar tissues, Brian [place]'s and Bartholin's glands are normal, no ulcers, no condylomatous lesions.  Urethral meatus: Normal, no lesions, no prolapse.  Urethra: Normal, no masses, no tenderness with palpation.  Bladder: Normal, no fullness, no masses, no tenderness with palpation.  Vagina: Vaginal tissues are not inflamed, normal color and texture, no significant discharge present.  Pelvic support adequate.  Cervix: Normal, no lesions, no purulent discharge, no cervical motion tenderness.  Ectropion visualized around external os.  IUD strings visualized.  Pap smear obtained.  Uterus: Normal size, shape, and consistency.  Good mobility noted.  Minimal descent noted with good support.  Adnexa: Normal size and shape bilaterally, no palpable mass bilaterally and non-tender bilaterally.  Rectal: Normal, no masses or polyps, confirms bimanual exam, perianal normal, no lesions; DAMIAN deferred.    IMPRESSION  Christie Griffith is a 36 y.o.  presenting with annual gynecological exam.    PLAN    1. Women's annual routine gynecological examination  - Counseled SBE, cervical cancer screening    2. Encounter for Papanicolaou smear for cervical cancer screening  - Liquid-based Pap Smear, Screening    3. Vaginal discharge  - Gardnerella vaginalis, Trichomonas vaginalis,  Candida albicans, DNA - Swab, Vagina; Future  - Gardnerella vaginalis, Trichomonas vaginalis, Candida albicans, DNA - Swab, Vagina    4. Unwanted fertility  Discussed permanent sterilization which she is interested in.  Discussed that this is a non-reversible form of contraception; she voices understanding.  Recommend bilateral salpingectomy as it is also a risk-reducing surgery for ovarian and fallopian tube cancers and helps to minimize risk of ectopic pregnancy as much as possible.  She would like to still have the Mirena IUD for menses control.  Will plan for laparoscopic bilateral salpingectomy, removal and reinsertion of Mirena intrauterine device.  She would like to have that done next summer; instructed patient to call next spring to schedule surgery.  Discussed that latest FDA guidelines state that the Mirena actually is beneficial for 7 years rather than just the 5 years.  She voices understanding.    This document has been electronically signed by Yancy Quan MD on November 9, 2021 16:14 CST.    CC: Becki Jean, MELONY

## 2021-11-12 ENCOUNTER — PATIENT ROUNDING (BHMG ONLY) (OUTPATIENT)
Dept: OBSTETRICS AND GYNECOLOGY | Facility: CLINIC | Age: 36
End: 2021-11-12

## 2021-11-12 NOTE — PROGRESS NOTES
November 12, 2021    Hello, may I speak with Christie Griffith?    My name is Yuliya Mauricio    I am  with VITA MATTHEWS  [facility]  [facility] [address]  [facility] [id number] [phone].    Before we get started may I verify your date of birth? 1985    I am calling to officially welcome you to our practice and ask about your recent visit. Is this a good time to talk? yes    Tell me about your visit with us. What things went well?  Patient states that her whole visit went perfect.       We're always looking for ways to make our patients' experiences even better. Do you have recommendations on ways we may improve?  No    Overall were you satisfied with your first visit to our practice? Yes       I appreciate you taking the time to speak with me today. Is there anything else I can do for you? Patient states she has no other concerns at this time.      Thank you, and have a great day.

## 2021-11-17 LAB
LAB AP CASE REPORT: NORMAL
PATH INTERP SPEC-IMP: NORMAL

## 2022-02-05 ENCOUNTER — DOCUMENTATION (OUTPATIENT)
Dept: FAMILY MEDICINE CLINIC | Facility: CLINIC | Age: 37
End: 2022-02-05

## 2022-02-05 RX ORDER — SULFAMETHOXAZOLE AND TRIMETHOPRIM 800; 160 MG/1; MG/1
1 TABLET ORAL 2 TIMES DAILY
Qty: 20 TABLET | Refills: 0 | Status: SHIPPED | OUTPATIENT
Start: 2022-02-05 | End: 2022-02-15

## 2022-02-05 RX ORDER — PHENAZOPYRIDINE HYDROCHLORIDE 200 MG/1
200 TABLET, FILM COATED ORAL 3 TIMES DAILY PRN
Qty: 12 TABLET | Refills: 1 | Status: SHIPPED | OUTPATIENT
Start: 2022-02-05 | End: 2022-07-19

## 2022-05-04 DIAGNOSIS — R41.840 CONCENTRATION DEFICIT: Primary | ICD-10-CM

## 2022-06-07 RX ORDER — CIPROFLOXACIN 500 MG/1
500 TABLET, FILM COATED ORAL 2 TIMES DAILY
Qty: 28 TABLET | Refills: 0 | Status: SHIPPED | OUTPATIENT
Start: 2022-06-07 | End: 2022-07-19

## 2022-06-07 RX ORDER — ONDANSETRON 4 MG/1
4 TABLET, FILM COATED ORAL EVERY 8 HOURS PRN
Qty: 30 TABLET | Refills: 1 | Status: SHIPPED | OUTPATIENT
Start: 2022-06-07 | End: 2022-07-19

## 2022-07-19 ENCOUNTER — TELEMEDICINE (OUTPATIENT)
Dept: PSYCHIATRY | Facility: CLINIC | Age: 37
End: 2022-07-19

## 2022-07-19 ENCOUNTER — LAB (OUTPATIENT)
Dept: LAB | Facility: HOSPITAL | Age: 37
End: 2022-07-19

## 2022-07-19 DIAGNOSIS — F90.2 ATTENTION DEFICIT HYPERACTIVITY DISORDER, COMBINED TYPE: Primary | ICD-10-CM

## 2022-07-19 DIAGNOSIS — Z02.89 OTHER GENERAL MEDICAL EXAMINATION FOR ADMINISTRATIVE PURPOSES: ICD-10-CM

## 2022-07-19 DIAGNOSIS — F41.1 GENERALIZED ANXIETY DISORDER: ICD-10-CM

## 2022-07-19 LAB
AMPHET+METHAMPHET UR QL: NEGATIVE
AMPHETAMINES UR QL: NEGATIVE
BARBITURATES UR QL SCN: NEGATIVE
BENZODIAZ UR QL SCN: NEGATIVE
BUPRENORPHINE SERPL-MCNC: NEGATIVE NG/ML
CANNABINOIDS SERPL QL: NEGATIVE
COCAINE UR QL: NEGATIVE
METHADONE UR QL SCN: NEGATIVE
OPIATES UR QL: NEGATIVE
OXYCODONE UR QL SCN: NEGATIVE
PCP UR QL SCN: NEGATIVE
PROPOXYPH UR QL: NEGATIVE
TRICYCLICS UR QL SCN: NEGATIVE

## 2022-07-19 PROCEDURE — 90792 PSYCH DIAG EVAL W/MED SRVCS: CPT | Performed by: NURSE PRACTITIONER

## 2022-07-19 PROCEDURE — 80306 DRUG TEST PRSMV INSTRMNT: CPT

## 2022-07-19 NOTE — PROGRESS NOTES
"This provider is located at [facility], [address] [place] [address], [facility], [id number] using a secure MyChart Video Visit through [employer]. Patient is being seen remotely via telehealth at their home address in [place], and stated they are in a secure environment for this session. The patient's condition being diagnosed/treated is appropriate for telemedicine. The provider identified herself as well as her credentials.   The patient, and/or patients guardian, consent to be seen remotely, and when consent is given they understand that the consent allows for patient identifiable information to be sent to a third party as needed.   They may refuse to be seen remotely at any time. The electronic data is encrypted and password protected, and the patient and/or guardian has been advised of the potential risks to privacy not withstanding such measures.   PT Identifiers used: Name and .    You have chosen to receive care through a telehealth visit.  Do you consent to use a video/audio connection for your medical care today? Yes      Subjective   Christie Griffith is a 37 y.o. female who presents today for initial evaluation for medication management    Chief Complaint:  \"ADHD issues\"     History of Present Illness:    Patient reported from home for the appointment.  Was referred by primary care provider [facility].  Reported history of distractibility as a child, she was reportedly high functioning and a perfectionist personality, but she had difficulty with recall, would make careless mistakes, reports she has been very talkative her whole life, she tries to finish sentences of other people.  In college she found it difficult to do timed test, she would try to hurry through everything.  She reports if she is reading affectional book which is entertaining this would be easy for her to read, but other things she has to read over and over to retain the information.  She has difficulty getting her mind " to turn off, she gets overwhelmed and when she sleeps even if she sleeps several hours she will wake up and still feel tired.  She denies any history of head injury or seizure.  She denies any symptoms of james.  Patient reported taking some phentermine back in October and noticed some improvement in her symptoms and this is what led her to believe that perhaps she had attention deficit.  PHQ-9 today is 17, JALEESA-7 is 13.  Patient reports she feels like the symptoms are clearly related to the frustrations that she encounters on a daily basis as a  and just dealing with life in general from the attention deficit symptoms.  She has been prescribed a low-dose of Wellbutrin, but reports she does not take it consistently and never has.  Her 10-year-old daughter has been diagnosed with ADHD combined.           Last Menstrual Period:  IUD    The following portions of the patient's history were reviewed and updated as appropriate: allergies, current medications, past family history, past medical history, past social history, past surgical history and problem list.    Past Psychiatric History:  Patient reports only taking the Wellbutrin, no history of admissions, no history of self harming behaviors, no SI, HI, hallucinations or psychosis.  She reports she did have postpartum depression and managed to work through it but it was very difficult.    Past Medical History:  Past Medical History:   Diagnosis Date   • Generalized anxiety disorder    • PONV (postoperative nausea and vomiting)    • Psoriasis    • Varicella 1990       Substance Abuse History:   Types:Denies all, including illicit      Social History:  Social History     Socioeconomic History   • Marital status:      Spouse name: Aurelio   • Number of children: 1   • Highest education level: Master's degree (e.g., MA, MS, Emma, MEd, MSW, JUSTINA)   Tobacco Use   • Smoking status: Never Smoker   • Smokeless tobacco: Never Used   Substance and Sexual  Activity   • Alcohol use: Not Currently     Alcohol/week: 0.0 standard drinks   • Drug use: Never   • Sexual activity: Yes     Birth control/protection: Implant   Patient reports good support system with family and friends.  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  Reports [employer] belief system.  Denies any history of  service or any legal issues.    Family History:  Family History   Problem Relation Age of Onset   • Lymphoma Mother    • Diabetes Mother    • Heart disease Mother    • Coronary artery disease Father    • Atrial fibrillation Father    • Heart disease Father    • Thyroid disease Other    • Heart murmur Daughter        Past Surgical History:  Past Surgical History:   Procedure Laterality Date   • BILATERAL BREAST REDUCTION     • MOUTH SURGERY      x 4 dental implants   • WISDOM TOOTH EXTRACTION         Problem List:  Patient Active Problem List   Diagnosis   • Malaise and fatigue   • Hypoglycemia   • Influenza-like illness   • Overweight   • Obesity due to excess calories without serious comorbidity       Allergy:   Allergies   Allergen Reactions   • Transderm-Scop [Scopolamine] Dizziness        Current Medications:   Current Outpatient Medications   Medication Sig Dispense Refill   • buPROPion (WELLBUTRIN) 75 MG tablet Take 1 tablet by mouth Daily. 30 tablet 11   • lisdexamfetamine (Vyvanse) 20 MG capsule Take 1 capsule by mouth Every Morning 30 capsule 0     No current facility-administered medications for this visit.       Review of Systems:    Review of Systems   Constitutional: Positive for fatigue.   Psychiatric/Behavioral: Positive for decreased concentration, sleep disturbance and stress. The patient is nervous/anxious.    All other systems reviewed and are negative.        Physical Exam:   Physical Exam  Vitals reviewed.   Constitutional:       Appearance: Normal appearance.   HENT:       Head: Normocephalic.   Neurological:      Mental Status: She is alert.   Psychiatric:         Attention and Perception: Attention and perception normal.         Mood and Affect: Affect normal. Mood is anxious.         Behavior: Behavior normal. Behavior is cooperative.         Thought Content: Thought content normal.         Cognition and Memory: Cognition and memory normal.         Judgment: Judgment normal.      Comments: Speech was rapid but not pressured         Vitals:  not currently breastfeeding. There is no height or weight on file to calculate BMI.  Due to extenuating circumstances and possible current health risks associated with the patient being present in a clinical setting (with current health restrictions in place in regards to possible COVID 19 transmission/exposure), the patient was seen remotely today via a MyChart Video Visit through [facility] and telephone encounter.  Unable to obtain vital signs due to nature of remote visit.  Height stated at 67  inches.  Weight stated at 180 pounds.    Last 3 Blood Pressure Readings:  BP Readings from Last 3 Encounters:   11/09/21 118/74   10/28/21 130/80   10/22/20 120/80       PHQ-9 Score:   PHQ-9 Total Score: (P) 17  PHQ-9 Depression Screening  Little interest or pleasure in doing things? (P) 2   Feeling down, depressed, or hopeless? (P) 0   Trouble falling or staying asleep, or sleeping too much? (P) 2   Feeling tired or having little energy? (P) 2   Poor appetite or overeating? (P) 3   Feeling bad about yourself - or that you are a failure or have let yourself or your family down? (P) 2   Trouble concentrating on things, such as reading the newspaper or watching television? (P) 3   Moving or speaking so slowly that other people could have noticed? Or the opposite - being so fidgety or restless that you have been moving around a lot more than usual? (P) 3   Thoughts that you would be better off dead, or of hurting yourself in some way? (P) 0   PHQ-9 Total Score  (P) 17   If you checked off any problems, how difficult have these problems made it for you to do your work, take care of things at home, or get along with other people? (P) Extremely dIfficult     PHQ-9 Total Score: (P) 17      Feeling nervous, anxious or on edge: (P) More than half the days  Not being able to stop or control worrying: (P) Several days  Worrying too much about different things: (P) Several days  Trouble Relaxing: (P) Nearly every day  Being so restless that it is hard to sit still: (P) More than half the days  Feeling afraid as if something awful might happen: (P) Several days  Becoming easily annoyed or irritable: (P) Nearly every day  JALEESA 7 Total Score: (P) 13  If you checked any problems, how difficult have these problems made it for you to do your work, take care of things at home, or get along with other people: (P) Very difficult      PROMIS scale screening tool that patient filled out virtually reviewed by this APRN at today's encounter.      Mental Status Exam:   Hygiene:   good  Cooperation:  Cooperative  Eye Contact:  Good  Psychomotor Behavior:  Appropriate  Affect:  Full range  Mood: anxious  Hopelessness: Denies  Speech:  Normal  Thought Process:  Goal directed and Linear  Thought Content:  Normal  Suicidal:  None  Homicidal:  None  Hallucinations:  None  Delusion:  None  Memory:  Intact  Orientation:  Person, Place, Time and Situation  Reliability:  good  Insight:  Good  Judgement:  Good  Impulse Control:  Good  Physical/Medical Issues:  No        Lab Results:   Lab on 07/19/2022   Component Date Value Ref Range Status   • THC, Screen, Urine 07/19/2022 Negative  Negative Final   • Phencyclidine (PCP), Urine 07/19/2022 Negative  Negative Final   • Cocaine Screen, Urine 07/19/2022 Negative  Negative Final   • Methamphetamine, Ur 07/19/2022 Negative  Negative Final   • Opiate Screen 07/19/2022 Negative  Negative Final   • Amphetamine Screen, Urine 07/19/2022 Negative  Negative Final   •  "Benzodiazepine Screen, Urine 07/19/2022 Negative  Negative Final   • Tricyclic Antidepressants Screen 07/19/2022 Negative  Negative Final   • Methadone Screen, Urine 07/19/2022 Negative  Negative Final   • Barbiturates Screen, Urine 07/19/2022 Negative  Negative Final   • Oxycodone Screen, Urine 07/19/2022 Negative  Negative Final   • Propoxyphene Screen 07/19/2022 Negative  Negative Final   • Buprenorphine, Screen, Urine 07/19/2022 Negative  Negative Final       EKG Results:  No orders to display     EKG from October 2021 was reviewed. Was read as \"abnormal\" but no significant findings were identified by PCP.     Assessment & Plan   Diagnoses and all orders for this visit:    1. Attention deficit hyperactivity disorder, combined type (Primary)  -     lisdexamfetamine (Vyvanse) 20 MG capsule; Take 1 capsule by mouth Every Morning  Dispense: 30 capsule; Refill: 0    2. Other general medical examination for administrative purposes  -     Urine Drug Screen - Urine, Clean Catch; Future    3. Generalized anxiety disorder        Visit Diagnoses:    ICD-10-CM ICD-9-CM   1. Attention deficit hyperactivity disorder, combined type  F90.2 314.01   2. Other general medical examination for administrative purposes  Z02.89 V70.3   3. Generalized anxiety disorder  F41.1 300.02         GOALS:  Short Term Goals: Patient will be compliant with medication, and patient will have no significant medication related side effects.  Patient will be engaged in psychotherapy as indicated.  Patient will report subjective improvement of symptoms.  Long term goals: To stabilize mood and treat/improve subjective symptoms, the patient will stay out of the hospital, the patient will be at an optimal level of functioning, and the patient will take all medications as prescribed.  The patient/guardian verbalized understanding and agreement with goals that were mutually set.      TREATMENT PLAN: Continue supportive psychotherapy efforts and medications " as indicated.   Pharmacological and Non-Pharmacological treatment options discussed during today's visit. Patient/Guardian acknowledged and verbally consented with current treatment plan and was educated on the importance of compliance with treatment and follow-up appointments.      MEDICATION ISSUES:  Discussed medication options and treatment plan of prescribed medication as well as the risks, benefits, any black box warnings, and side effects including potential falls, possible impaired driving, and metabolic adversities among others. Patient is agreeable to call the office with any worsening of symptoms or onset of side effects, or if any concerns or questions arise.  The contact information for the office is made available to the patient. Patient is agreeable to call 911 or go to the nearest ER should they begin having any SI/HI, or if any urgent concerns arise. No medication side effects or related complaints today.     Start Vyvanse as below.  Advised to go to [email or web address] and downloaded co-pay coupon in case insurance will not cover.  If the medicine is too expensive, will consider generic Adderall.    MEDS ORDERED DURING VISIT:  New Medications Ordered This Visit   Medications   • lisdexamfetamine (Vyvanse) 20 MG capsule     Sig: Take 1 capsule by mouth Every Morning     Dispense:  30 capsule     Refill:  0       Follow Up Appointment:   Return in about 3 weeks (around 8/9/2022) for Recheck, Video visit.               This document has been electronically signed by MELONY Reich  July 20, 2022 16:53 EDT    Please note that portions of this note were completed with a voice recognition program. Efforts were made to edit dictation, but occasionally words are mistranscribed.

## 2022-08-09 ENCOUNTER — TELEMEDICINE (OUTPATIENT)
Dept: PSYCHIATRY | Facility: CLINIC | Age: 37
End: 2022-08-09

## 2022-08-09 DIAGNOSIS — F90.2 ATTENTION DEFICIT HYPERACTIVITY DISORDER, COMBINED TYPE: Primary | ICD-10-CM

## 2022-08-09 PROCEDURE — 99214 OFFICE O/P EST MOD 30 MIN: CPT | Performed by: NURSE PRACTITIONER

## 2022-08-09 RX ORDER — DEXTROAMPHETAMINE SACCHARATE, AMPHETAMINE ASPARTATE MONOHYDRATE, DEXTROAMPHETAMINE SULFATE AND AMPHETAMINE SULFATE 2.5; 2.5; 2.5; 2.5 MG/1; MG/1; MG/1; MG/1
10 CAPSULE, EXTENDED RELEASE ORAL DAILY
Qty: 30 CAPSULE | Refills: 0 | Status: SHIPPED | OUTPATIENT
Start: 2022-08-09 | End: 2022-09-06 | Stop reason: DRUGHIGH

## 2022-08-09 NOTE — PROGRESS NOTES
"This provider is located at [facility], [address] [place] [address], [facility], [address] using a secure MyChart Video Visit through [employer]. Patient is being seen remotely via telehealth at their home address in [place], and stated they are in a secure environment for this session. The patient's condition being diagnosed/treated is appropriate for telemedicine. The provider identified herself as well as her credentials.   The patient, and/or patients guardian, consent to be seen remotely, and when consent is given they understand that the consent allows for patient identifiable information to be sent to a third party as needed.   They may refuse to be seen remotely at any time. The electronic data is encrypted and password protected, and the patient and/or guardian has been advised of the potential risks to privacy not withstanding such measures.   PT Identifiers used: Name and .    You have chosen to receive care through a telehealth visit.  Do you consent to use a video/audio connection for your medical care today? Yes      Subjective   Christie Griffith is a 37 y.o. female who presents today for follow up for medication management    Chief Complaint:  \"definitely improved but Vyvanse is not covered on my insurance. \"     History of Present Illness:    Patient reported from her work site in [facility] for appointment.  She is a  at [employer] elementary school.  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  Patient reported Vyvanse has been a very good medicine, but it is very expensive and her insurance will not cover it.  She reported that it seems like there \"was a lot of loud music in her head that has now been turned down to a whisper\", she feels like she is not going crazy and she is definitely more focused, there are no side effects reported to the medication.  Sleep is also improved as she feels rested when she wakes up.  Previous PHQ-9 was 17, previous JALEESA-7 was 13.  " PHQ-9 today is 1, JALEESA-7 score today is 2.  She discontinued the Wellbutrin without any issues.       Last Menstrual Period:  IUD    The following portions of the patient's history were reviewed and updated as appropriate: allergies, current medications, past family history, past medical history, past social history, past surgical history and problem list.    Past Psychiatric History:  Patient reports only taking the Wellbutrin, no history of admissions, no history of self harming behaviors, no SI, HI, hallucinations or psychosis.  She reports she did have postpartum depression and managed to work through it but it was very difficult.    Past Medical History:  Past Medical History:   Diagnosis Date   • Generalized anxiety disorder    • PONV (postoperative nausea and vomiting)    • Psoriasis    • Varicella 1990       Substance Abuse History:   Types:Denies all, including illicit      Social History:  Social History     Socioeconomic History   • Marital status:      Spouse name: Aurelio   • Number of children: 1   • Highest education level: Master's degree (e.g., MA, MS, Emma, MEd, MSW, JUSTINA)   Tobacco Use   • Smoking status: Never Smoker   • Smokeless tobacco: Never Used   Substance and Sexual Activity   • Alcohol use: Not Currently     Alcohol/week: 0.0 standard drinks   • Drug use: Never   • Sexual activity: Yes     Birth control/protection: Implant   Patient reports good support system with family and friends.  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  Reports Tenriism belief system.  Denies any history of  service or any legal issues.    Family History:  Family History   Problem Relation Age of Onset   • Lymphoma Mother    • Diabetes Mother    • Heart disease Mother    • Coronary artery disease Father    • Atrial fibrillation Father    • Heart disease Father    • Thyroid disease Other    • Heart murmur  Daughter        Past Surgical History:  Past Surgical History:   Procedure Laterality Date   • BILATERAL BREAST REDUCTION     • MOUTH SURGERY      x 4 dental implants   • WISDOM TOOTH EXTRACTION         Problem List:  Patient Active Problem List   Diagnosis   • Malaise and fatigue   • Hypoglycemia   • Influenza-like illness   • Overweight   • Obesity due to excess calories without serious comorbidity       Allergy:   Allergies   Allergen Reactions   • Transderm-Scop [Scopolamine] Dizziness        Current Medications:   Current Outpatient Medications   Medication Sig Dispense Refill   • amphetamine-dextroamphetamine XR (Adderall XR) 10 MG 24 hr capsule Take 1 capsule by mouth Daily 30 capsule 0   • buPROPion (WELLBUTRIN) 75 MG tablet Take 1 tablet by mouth Daily. 30 tablet 11     No current facility-administered medications for this visit.       Review of Systems:    Review of Systems   All other systems reviewed and are negative.        Physical Exam:   Physical Exam  Vitals reviewed.   Constitutional:       Appearance: Normal appearance.   HENT:      Head: Normocephalic.   Neurological:      Mental Status: She is alert.   Psychiatric:         Attention and Perception: Attention and perception normal.         Mood and Affect: Mood and affect normal.         Speech: Speech normal.         Behavior: Behavior normal. Behavior is cooperative.         Thought Content: Thought content normal.         Cognition and Memory: Cognition and memory normal.         Judgment: Judgment normal.      Comments:           Vitals:  not currently breastfeeding. There is no height or weight on file to calculate BMI.  Due to extenuating circumstances and possible current health risks associated with the patient being present in a clinical setting (with current health restrictions in place in regards to possible COVID 19 transmission/exposure), the patient was seen remotely today via a MyChart Video Visit through EP[employer] and telephone  encounter.  Unable to obtain vital signs due to nature of remote visit.  Height stated at 67  inches.  Weight stated at 180 pounds.    Last 3 Blood Pressure Readings:  BP Readings from Last 3 Encounters:   11/09/21 118/74   10/28/21 130/80   10/22/20 120/80       PHQ-9 Score:   PHQ-9 Total Score: (P) 1  PHQ-9 Depression Screening  Little interest or pleasure in doing things? (P) 0   Feeling down, depressed, or hopeless? (P) 0   Trouble falling or staying asleep, or sleeping too much? (P) 0   Feeling tired or having little energy? (P) 1   Poor appetite or overeating? (P) 0   Feeling bad about yourself - or that you are a failure or have let yourself or your family down? (P) 0   Trouble concentrating on things, such as reading the newspaper or watching television? (P) 0   Moving or speaking so slowly that other people could have noticed? Or the opposite - being so fidgety or restless that you have been moving around a lot more than usual? (P) 0   Thoughts that you would be better off dead, or of hurting yourself in some way? (P) 0   PHQ-9 Total Score (P) 1   If you checked off any problems, how difficult have these problems made it for you to do your work, take care of things at home, or get along with other people? (P) Not difficult at all     PHQ-9 Total Score: (P) 1      Not being able to stop or control worrying: (P) Not at all  Worrying too much about different things: (P) Not at all  Trouble Relaxing: (P) Several days  Being so restless that it is hard to sit still: (P) Not at all  Feeling afraid as if something awful might happen: (P) Not at all  Becoming easily annoyed or irritable: (P) Several days  JALEESA 7 Total Score: (P) 2  If you checked any problems, how difficult have these problems made it for you to do your work, take care of things at home, or get along with other people: (P) Not difficult at all      PROMIS scale screening tool that patient filled out virtually reviewed by this APRN at today's  "encounter.      Mental Status Exam:   Hygiene:   good  Cooperation:  Cooperative  Eye Contact:  Good  Psychomotor Behavior:  Appropriate  Affect:  Full range  Mood: euthymic  Hopelessness: Denies  Speech:  Normal  Thought Process:  Goal directed and Linear  Thought Content:  Normal  Suicidal:  None  Homicidal:  None  Hallucinations:  None  Delusion:  None  Memory:  Intact  Orientation:  Person, Place, Time and Situation  Reliability:  good  Insight:  Good  Judgement:  Good  Impulse Control:  Good  Physical/Medical Issues:  No        Lab Results:   Lab on 07/19/2022   Component Date Value Ref Range Status   • THC, Screen, Urine 07/19/2022 Negative  Negative Final   • Phencyclidine (PCP), Urine 07/19/2022 Negative  Negative Final   • Cocaine Screen, Urine 07/19/2022 Negative  Negative Final   • Methamphetamine, Ur 07/19/2022 Negative  Negative Final   • Opiate Screen 07/19/2022 Negative  Negative Final   • Amphetamine Screen, Urine 07/19/2022 Negative  Negative Final   • Benzodiazepine Screen, Urine 07/19/2022 Negative  Negative Final   • Tricyclic Antidepressants Screen 07/19/2022 Negative  Negative Final   • Methadone Screen, Urine 07/19/2022 Negative  Negative Final   • Barbiturates Screen, Urine 07/19/2022 Negative  Negative Final   • Oxycodone Screen, Urine 07/19/2022 Negative  Negative Final   • Propoxyphene Screen 07/19/2022 Negative  Negative Final   • Buprenorphine, Screen, Urine 07/19/2022 Negative  Negative Final       EKG Results:  No orders to display     EKG from October 2021 was reviewed. Was read as \"abnormal\" but no significant findings were identified by PCP.     Assessment & Plan   Diagnoses and all orders for this visit:    1. Attention deficit hyperactivity disorder, combined type (Primary)  -     amphetamine-dextroamphetamine XR (Adderall XR) 10 MG 24 hr capsule; Take 1 capsule by mouth Daily  Dispense: 30 capsule; Refill: 0        Visit Diagnoses:    ICD-10-CM ICD-9-CM   1. Attention deficit " "hyperactivity disorder, combined type  F90.2 314.01         GOALS:  Short Term Goals: Patient will be compliant with medication, and patient will have no significant medication related side effects.  Patient will be engaged in psychotherapy as indicated.  Patient will report subjective improvement of symptoms.  Long term goals: To stabilize mood and treat/improve subjective symptoms, the patient will stay out of the hospital, the patient will be at an optimal level of functioning, and the patient will take all medications as prescribed.  The patient/guardian verbalized understanding and agreement with goals that were mutually set.    RISK ASSESSMENT  Current harm-to-self risk is reported by pt as \"none.\"  Current [id number] risk is reported by pt as \"none.\"   No suicidal thoughts, intent, plan is appreciated by this provider on this date of exam.   No homicidal thoughts, intent, plan is appreciated by this provider on this date.      TREATMENT PLAN: Continue supportive psychotherapy efforts and medications as indicated.   Pharmacological and Non-Pharmacological treatment options discussed during today's visit. Patient/Guardian acknowledged and verbally consented with current treatment plan and was educated on the importance of compliance with treatment and follow-up appointments.      MEDICATION ISSUES:  Discussed medication options and treatment plan of prescribed medication as well as the risks, benefits, any black box warnings, and side effects including potential falls, possible impaired driving, and metabolic adversities among others. Patient is agreeable to call the office with any worsening of symptoms or onset of side effects, or if any concerns or questions arise.  The contact information for the office is made available to the patient. Patient is agreeable to call 911 or go to the nearest ER should they begin having any SI/HI, or if any urgent concerns arise. No medication side effects or related " complaints today.     Start Adderall 10 mg XR every morning when finished with the Vyvanse prescription.    MEDS ORDERED DURING VISIT:  New Medications Ordered This Visit   Medications   • amphetamine-dextroamphetamine XR (Adderall XR) 10 MG 24 hr capsule     Sig: Take 1 capsule by mouth Daily     Dispense:  30 capsule     Refill:  0     vyvanse too expensive       Follow Up Appointment:   Return in about 4 weeks (around 9/6/2022) for Recheck, Video visit.               This document has been electronically signed by MELONY Reich  August 10, 2022 08:01 EDT    Please note that portions of this note were completed with a voice recognition program. Efforts were made to edit dictation, but occasionally words are mistranscribed.

## 2022-08-24 ENCOUNTER — LAB (OUTPATIENT)
Dept: LAB | Facility: HOSPITAL | Age: 37
End: 2022-08-24

## 2022-08-24 DIAGNOSIS — R53.81 MALAISE AND FATIGUE: Primary | ICD-10-CM

## 2022-08-24 DIAGNOSIS — R53.83 MALAISE AND FATIGUE: Primary | ICD-10-CM

## 2022-08-24 PROCEDURE — 82607 VITAMIN B-12: CPT | Performed by: NURSE PRACTITIONER

## 2022-08-24 PROCEDURE — 36415 COLL VENOUS BLD VENIPUNCTURE: CPT | Performed by: NURSE PRACTITIONER

## 2022-08-24 PROCEDURE — 82306 VITAMIN D 25 HYDROXY: CPT | Performed by: NURSE PRACTITIONER

## 2022-08-24 PROCEDURE — 84439 ASSAY OF FREE THYROXINE: CPT | Performed by: NURSE PRACTITIONER

## 2022-08-24 PROCEDURE — 80050 GENERAL HEALTH PANEL: CPT | Performed by: NURSE PRACTITIONER

## 2022-08-24 PROCEDURE — 86757 RICKETTSIA ANTIBODY: CPT | Performed by: NURSE PRACTITIONER

## 2022-08-24 PROCEDURE — 83540 ASSAY OF IRON: CPT | Performed by: NURSE PRACTITIONER

## 2022-08-24 PROCEDURE — 83036 HEMOGLOBIN GLYCOSYLATED A1C: CPT | Performed by: NURSE PRACTITIONER

## 2022-08-24 PROCEDURE — 84480 ASSAY TRIIODOTHYRONINE (T3): CPT | Performed by: NURSE PRACTITIONER

## 2022-08-25 ENCOUNTER — TELEPHONE (OUTPATIENT)
Dept: FAMILY MEDICINE CLINIC | Facility: CLINIC | Age: 37
End: 2022-08-25

## 2022-08-25 LAB
25(OH)D3 SERPL-MCNC: 39.5 NG/ML (ref 30–100)
ALBUMIN SERPL-MCNC: 5 G/DL (ref 3.5–5.2)
ALBUMIN/GLOB SERPL: 2.5 G/DL
ALP SERPL-CCNC: 72 U/L (ref 39–117)
ALT SERPL W P-5'-P-CCNC: 24 U/L (ref 1–33)
ANION GAP SERPL CALCULATED.3IONS-SCNC: 11 MMOL/L (ref 5–15)
AST SERPL-CCNC: 20 U/L (ref 1–32)
BASOPHILS # BLD AUTO: 0.08 10*3/MM3 (ref 0–0.2)
BASOPHILS NFR BLD AUTO: 1.1 % (ref 0–1.5)
BILIRUB SERPL-MCNC: 0.9 MG/DL (ref 0–1.2)
BUN SERPL-MCNC: 12 MG/DL (ref 6–20)
BUN/CREAT SERPL: 15 (ref 7–25)
CALCIUM SPEC-SCNC: 9.7 MG/DL (ref 8.6–10.5)
CHLORIDE SERPL-SCNC: 103 MMOL/L (ref 98–107)
CO2 SERPL-SCNC: 25 MMOL/L (ref 22–29)
CREAT SERPL-MCNC: 0.8 MG/DL (ref 0.57–1)
DEPRECATED RDW RBC AUTO: 41 FL (ref 37–54)
EGFRCR SERPLBLD CKD-EPI 2021: 97.5 ML/MIN/1.73
EOSINOPHIL # BLD AUTO: 0.15 10*3/MM3 (ref 0–0.4)
EOSINOPHIL NFR BLD AUTO: 2 % (ref 0.3–6.2)
ERYTHROCYTE [DISTWIDTH] IN BLOOD BY AUTOMATED COUNT: 12.7 % (ref 12.3–15.4)
GLOBULIN UR ELPH-MCNC: 2 GM/DL
GLUCOSE SERPL-MCNC: 91 MG/DL (ref 65–99)
HBA1C MFR BLD: 5.3 % (ref 4.8–5.6)
HCT VFR BLD AUTO: 40.2 % (ref 34–46.6)
HGB BLD-MCNC: 13.7 G/DL (ref 12–15.9)
IMM GRANULOCYTES # BLD AUTO: 0.02 10*3/MM3 (ref 0–0.05)
IMM GRANULOCYTES NFR BLD AUTO: 0.3 % (ref 0–0.5)
IRON 24H UR-MRATE: 62 MCG/DL (ref 37–145)
LYMPHOCYTES # BLD AUTO: 2.3 10*3/MM3 (ref 0.7–3.1)
LYMPHOCYTES NFR BLD AUTO: 30.7 % (ref 19.6–45.3)
MCH RBC QN AUTO: 30.5 PG (ref 26.6–33)
MCHC RBC AUTO-ENTMCNC: 34.1 G/DL (ref 31.5–35.7)
MCV RBC AUTO: 89.5 FL (ref 79–97)
MONOCYTES # BLD AUTO: 0.59 10*3/MM3 (ref 0.1–0.9)
MONOCYTES NFR BLD AUTO: 7.9 % (ref 5–12)
NEUTROPHILS NFR BLD AUTO: 4.35 10*3/MM3 (ref 1.7–7)
NEUTROPHILS NFR BLD AUTO: 58 % (ref 42.7–76)
NRBC BLD AUTO-RTO: 0 /100 WBC (ref 0–0.2)
PLATELET # BLD AUTO: 359 10*3/MM3 (ref 140–450)
PMV BLD AUTO: 11.2 FL (ref 6–12)
POTASSIUM SERPL-SCNC: 3.3 MMOL/L (ref 3.5–5.2)
PROT SERPL-MCNC: 7 G/DL (ref 6–8.5)
RBC # BLD AUTO: 4.49 10*6/MM3 (ref 3.77–5.28)
SODIUM SERPL-SCNC: 139 MMOL/L (ref 136–145)
T3 SERPL-MCNC: 126 NG/DL (ref 80–200)
T4 FREE SERPL-MCNC: 1.24 NG/DL (ref 0.93–1.7)
TSH SERPL DL<=0.05 MIU/L-ACNC: 0.69 UIU/ML (ref 0.27–4.2)
VIT B12 BLD-MCNC: 721 PG/ML (ref 211–946)
WBC NRBC COR # BLD: 7.49 10*3/MM3 (ref 3.4–10.8)

## 2022-08-25 NOTE — PROGRESS NOTES
Per MELONY Connell, Ms. Griffith has been called with recent lab results & recommendations.  Continue current medications and follow-up as planned or sooner if any problems.

## 2022-08-25 NOTE — TELEPHONE ENCOUNTER
Per MELONY Connell, Ms. Griffith has been called with recent lab results & recommendations.  Continue current medications and follow-up as planned or sooner if any problems.       ----- Message from MELONY Galloway sent at 8/25/2022  7:49 AM CDT -----  Can you let her know labs are good except needs extra potassium for a couple of days, make sure shes eating well -her potassium has been low before

## 2022-09-01 LAB
R RICKETTSI IGG SER QL IA: POSITIVE
R RICKETTSI IGG TITR SER IF: NORMAL {TITER}
R RICKETTSI IGM SER-ACNC: 0.31 INDEX (ref 0–0.89)

## 2022-09-01 RX ORDER — DOXYCYCLINE HYCLATE 100 MG/1
100 CAPSULE ORAL 2 TIMES DAILY
Qty: 28 CAPSULE | Refills: 0 | Status: SHIPPED | OUTPATIENT
Start: 2022-09-01 | End: 2022-12-28

## 2022-09-06 ENCOUNTER — TELEMEDICINE (OUTPATIENT)
Dept: PSYCHIATRY | Facility: CLINIC | Age: 37
End: 2022-09-06

## 2022-09-06 DIAGNOSIS — F90.2 ATTENTION DEFICIT HYPERACTIVITY DISORDER, COMBINED TYPE: Primary | ICD-10-CM

## 2022-09-06 PROCEDURE — 99214 OFFICE O/P EST MOD 30 MIN: CPT | Performed by: NURSE PRACTITIONER

## 2022-09-06 RX ORDER — DEXTROAMPHETAMINE SACCHARATE, AMPHETAMINE ASPARTATE MONOHYDRATE, DEXTROAMPHETAMINE SULFATE AND AMPHETAMINE SULFATE 5; 5; 5; 5 MG/1; MG/1; MG/1; MG/1
20 CAPSULE, EXTENDED RELEASE ORAL EVERY MORNING
Qty: 30 CAPSULE | Refills: 0 | Status: SHIPPED | OUTPATIENT
Start: 2022-09-06 | End: 2022-10-03 | Stop reason: SDUPTHER

## 2022-09-06 NOTE — PROGRESS NOTES
"This provider is located at [facility], [address] [place] [address], [facility], [id number] using a secure MyChart Video Visit through [employer]. Patient is being seen remotely via telehealth at their home address in [place], and stated they are in a secure environment for this session. The patient's condition being diagnosed/treated is appropriate for telemedicine. The provider identified herself as well as her credentials.   The patient, and/or patients guardian, consent to be seen remotely, and when consent is given they understand that the consent allows for patient identifiable information to be sent to a third party as needed.   They may refuse to be seen remotely at any time. The electronic data is encrypted and password protected, and the patient and/or guardian has been advised of the potential risks to privacy not withstanding such measures.   PT Identifiers used: Name and .    You have chosen to receive care through a telehealth visit.  Do you consent to use a video/audio connection for your medical care today? Yes      Subjective   Christie Griffith is a 37 y.o. female who presents today for follow up for medication management    Chief Complaint:  \"The Adderall is not working as well as the Vyvanse did\"     History of Present Illness:    Patient reported from her work site in [facility] for appointment.  She is a  at [employer] school.   Reports above chief complaint.  States she has been taking the 10 mg XR Adderall every morning but it is not as effective as the Vyvanse was.  She does not feel like it is helping her symptoms as much, she is becoming more distracted, the medication is wearing off sooner than the Vyvanse did.  She also has been diagnosed with having [place] spotted fever at some point and also had decreased potassium.  Sees primary care for these issues.          Last Menstrual Period:  IUD    The following portions of the patient's history were reviewed " and updated as appropriate: allergies, current medications, past family history, past medical history, past social history, past surgical history and problem list.    Past Psychiatric History:  Patient reports only taking the Wellbutrin, no history of admissions, no history of self harming behaviors, no SI, HI, hallucinations or psychosis.  She reports she did have postpartum depression and managed to work through it but it was very difficult.    Past Medical History:  Past Medical History:   Diagnosis Date   • Generalized anxiety disorder    • PONV (postoperative nausea and vomiting)    • Psoriasis    • Varicella 1990       Substance Abuse History:   Types:Denies all, including illicit      Social History:  Social History     Socioeconomic History   • Marital status:      Spouse name: Aurelio   • Number of children: 1   • Highest education level: Master's degree (e.g., MA, MS, Emma, MEd, MSW, JUSTINA)   Tobacco Use   • Smoking status: Never Smoker   • Smokeless tobacco: Never Used   Substance and Sexual Activity   • Alcohol use: Not Currently     Alcohol/week: 0.0 standard drinks   • Drug use: Never   • Sexual activity: Yes     Birth control/protection: Implant   Patient reports good support system with family and friends.  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  Reports Orthodoxy belief system.  Denies any history of  service or any legal issues.    Family History:  Family History   Problem Relation Age of Onset   • Lymphoma Mother    • Diabetes Mother    • Heart disease Mother    • Coronary artery disease Father    • Atrial fibrillation Father    • Heart disease Father    • Thyroid disease Other    • Heart murmur Daughter        Past Surgical History:  Past Surgical History:   Procedure Laterality Date   • BILATERAL BREAST REDUCTION     • MOUTH SURGERY      x 4 dental implants   • WISDOM TOOTH EXTRACTION          Problem List:  Patient Active Problem List   Diagnosis   • Malaise and fatigue   • Hypoglycemia   • Influenza-like illness   • Overweight   • Obesity due to excess calories without serious comorbidity       Allergy:   Allergies   Allergen Reactions   • Transderm-Scop [Scopolamine] Dizziness        Current Medications:   Current Outpatient Medications   Medication Sig Dispense Refill   • amphetamine-dextroamphetamine XR (Adderall XR) 20 MG 24 hr capsule Take 1 capsule by mouth Every Morning 30 capsule 0   • doxycycline (VIBRAMYCIN) 100 MG capsule Take 1 capsule by mouth 2 (Two) Times a Day. 28 capsule 0     No current facility-administered medications for this visit.       Review of Systems:    Review of Systems   All other systems reviewed and are negative.        Physical Exam:   Physical Exam  Vitals reviewed.   Constitutional:       Appearance: Normal appearance.   HENT:      Head: Normocephalic.   Neurological:      Mental Status: She is alert.   Psychiatric:         Attention and Perception: Attention and perception normal.         Mood and Affect: Mood and affect normal.         Speech: Speech normal.         Behavior: Behavior normal. Behavior is cooperative.         Thought Content: Thought content normal.         Cognition and Memory: Cognition and memory normal.         Judgment: Judgment normal.      Comments:           Vitals:  not currently breastfeeding. There is no height or weight on file to calculate BMI.  Due to extenuating circumstances and possible current health risks associated with the patient being present in a clinical setting (with current health restrictions in place in regards to possible COVID 19 transmission/exposure), the patient was seen remotely today via a MyChart Video Visit through [facility] and telephone encounter.  Unable to obtain vital signs due to nature of remote visit.  Height stated at 67  inches.  Weight stated at 182 pounds.    Last 3 Blood Pressure Readings:  BP  Readings from Last 3 Encounters:   11/09/21 118/74   10/28/21 130/80   10/22/20 120/80       PHQ-9 Score:   PHQ-9 Total Score: (P) 6  PHQ-9 Depression Screening  Little interest or pleasure in doing things? (P) 0   Feeling down, depressed, or hopeless? (P) 0   Trouble falling or staying asleep, or sleeping too much? (P) 2   Feeling tired or having little energy? (P) 2   Poor appetite or overeating? (P) 0   Feeling bad about yourself - or that you are a failure or have let yourself or your family down? (P) 0   Trouble concentrating on things, such as reading the newspaper or watching television? (P) 1   Moving or speaking so slowly that other people could have noticed? Or the opposite - being so fidgety or restless that you have been moving around a lot more than usual? (P) 1   Thoughts that you would be better off dead, or of hurting yourself in some way? (P) 0   PHQ-9 Total Score (P) 6   If you checked off any problems, how difficult have these problems made it for you to do your work, take care of things at home, or get along with other people? (P) Somewhat difficult     PHQ-9 Total Score: (P) 6      Not being able to stop or control worrying: (P) Not at all  Worrying too much about different things: (P) Several days  Trouble Relaxing: (P) Several days  Being so restless that it is hard to sit still: (P) Not at all  Feeling afraid as if something awful might happen: (P) Not at all  Becoming easily annoyed or irritable: (P) Several days  JALEESA 7 Total Score: (P) 3  If you checked any problems, how difficult have these problems made it for you to do your work, take care of things at home, or get along with other people: (P) Somewhat difficult      PROMIS scale screening tool that patient filled out virtually reviewed by this APRN at today's encounter.      Mental Status Exam:   Hygiene:   good  Cooperation:  Cooperative  Eye Contact:  Good  Psychomotor Behavior:  Appropriate  Affect:  Full range  Mood:  euthymic  Hopelessness: Denies  Speech:  Normal  Thought Process:  Goal directed and Linear  Thought Content:  Normal  Suicidal:  None  Homicidal:  None  Hallucinations:  None  Delusion:  None  Memory:  Intact  Orientation:  Person, Place, Time and Situation  Reliability:  good  Insight:  Good  Judgement:  Good  Impulse Control:  Good  Physical/Medical Issues:  No        Lab Results:   Orders Only on 08/24/2022   Component Date Value Ref Range Status   • RMSF IgG 08/24/2022 Positive (A) Negative Final   • RMSF IgM 08/24/2022 0.31  0.00 - 0.89 index Final                                     Negative        <0.90                                   Equivocal 0.90 - 1.10                                   Positive        >1.10   • RMSF IgG 08/24/2022 <1:64  Neg <1:64 Final                                 Negative           <1:64                               Positive            1:64                               Recent/Active      >1:64  Titers of 1:64 are suggestive of past or possible  current infection. Titers >1:64 are suggestive of  recent or active infection. Approximately 9% of  specimens positive by EIA screen are negative by IFA.   Orders Only on 08/24/2022   Component Date Value Ref Range Status   • Glucose 08/24/2022 91  65 - 99 mg/dL Final   • BUN 08/24/2022 12  6 - 20 mg/dL Final   • Creatinine 08/24/2022 0.80  0.57 - 1.00 mg/dL Final   • Sodium 08/24/2022 139  136 - 145 mmol/L Final   • Potassium 08/24/2022 3.3 (A) 3.5 - 5.2 mmol/L Final   • Chloride 08/24/2022 103  98 - 107 mmol/L Final   • CO2 08/24/2022 25.0  22.0 - 29.0 mmol/L Final   • Calcium 08/24/2022 9.7  8.6 - 10.5 mg/dL Final   • Total Protein 08/24/2022 7.0  6.0 - 8.5 g/dL Final   • Albumin 08/24/2022 5.00  3.50 - 5.20 g/dL Final   • ALT (SGPT) 08/24/2022 24  1 - 33 U/L Final   • AST (SGOT) 08/24/2022 20  1 - 32 U/L Final   • Alkaline Phosphatase 08/24/2022 72  39 - 117 U/L Final   • Total Bilirubin 08/24/2022 0.9  0.0 - 1.2 mg/dL Final   •  Globulin 08/24/2022 2.0  gm/dL Final   • A/G Ratio 08/24/2022 2.5  g/dL Final   • BUN/Creatinine Ratio 08/24/2022 15.0  7.0 - 25.0 Final   • Anion Gap 08/24/2022 11.0  5.0 - 15.0 mmol/L Final   • eGFR 08/24/2022 97.5  >60.0 mL/min/1.73 Final    National Kidney Foundation and American Society of Nephrology (ASN) Task Force recommended calculation based on the Chronic Kidney Disease Epidemiology Collaboration (CKD-EPI) equation refit without adjustment for race.   • Hemoglobin A1C 08/24/2022 5.30  4.80 - 5.60 % Final   • Iron 08/24/2022 62  37 - 145 mcg/dL Final   • 25 Hydroxy, Vitamin D 08/24/2022 39.5  30.0 - 100.0 ng/ml Final   • Vitamin B-12 08/24/2022 721  211 - 946 pg/mL Final   • TSH 08/24/2022 0.693  0.270 - 4.200 uIU/mL Final   • Free T4 08/24/2022 1.24  0.93 - 1.70 ng/dL Final   • T3, Total 08/24/2022 126.0  80.0 - 200.0 ng/dl Final   • WBC 08/24/2022 7.49  3.40 - 10.80 10*3/mm3 Final   • RBC 08/24/2022 4.49  3.77 - 5.28 10*6/mm3 Final   • Hemoglobin 08/24/2022 13.7  12.0 - 15.9 g/dL Final   • Hematocrit 08/24/2022 40.2  34.0 - 46.6 % Final   • MCV 08/24/2022 89.5  79.0 - 97.0 fL Final   • MCH 08/24/2022 30.5  26.6 - 33.0 pg Final   • MCHC 08/24/2022 34.1  31.5 - 35.7 g/dL Final   • RDW 08/24/2022 12.7  12.3 - 15.4 % Final   • RDW-SD 08/24/2022 41.0  37.0 - 54.0 fl Final   • MPV 08/24/2022 11.2  6.0 - 12.0 fL Final   • Platelets 08/24/2022 359  140 - 450 10*3/mm3 Final   • Neutrophil % 08/24/2022 58.0  42.7 - 76.0 % Final   • Lymphocyte % 08/24/2022 30.7  19.6 - 45.3 % Final   • Monocyte % 08/24/2022 7.9  5.0 - 12.0 % Final   • Eosinophil % 08/24/2022 2.0  0.3 - 6.2 % Final   • Basophil % 08/24/2022 1.1  0.0 - 1.5 % Final   • Immature Grans % 08/24/2022 0.3  0.0 - 0.5 % Final   • Neutrophils, Absolute 08/24/2022 4.35  1.70 - 7.00 10*3/mm3 Final   • Lymphocytes, Absolute 08/24/2022 2.30  0.70 - 3.10 10*3/mm3 Final   • Monocytes, Absolute 08/24/2022 0.59  0.10 - 0.90 10*3/mm3 Final   • Eosinophils, Absolute  "08/24/2022 0.15  0.00 - 0.40 10*3/mm3 Final   • Basophils, Absolute 08/24/2022 0.08  0.00 - 0.20 10*3/mm3 Final   • Immature Grans, Absolute 08/24/2022 0.02  0.00 - 0.05 10*3/mm3 Final   • nRBC 08/24/2022 0.0  0.0 - 0.2 /100 WBC Final       Assessment & Plan   Diagnoses and all orders for this visit:    1. Attention deficit hyperactivity disorder, combined type (Primary)  -     amphetamine-dextroamphetamine XR (Adderall XR) 20 MG 24 hr capsule; Take 1 capsule by mouth Every Morning  Dispense: 30 capsule; Refill: 0        Visit Diagnoses:    ICD-10-CM ICD-9-CM   1. Attention deficit hyperactivity disorder, combined type  F90.2 314.01         GOALS:  Short Term Goals: Patient will be compliant with medication, and patient will have no significant medication related side effects.  Patient will be engaged in psychotherapy as indicated.  Patient will report subjective improvement of symptoms.  Long term goals: To stabilize mood and treat/improve subjective symptoms, the patient will stay out of the hospital, the patient will be at an optimal level of functioning, and the patient will take all medications as prescribed.  The patient/guardian verbalized understanding and agreement with goals that were mutually set.    RISK ASSESSMENT  Current harm-to-self risk is reported by pt as \"none.\"  Current [id number] risk is reported by pt as \"none.\"   No suicidal thoughts, intent, plan is appreciated by this provider on this date of exam.   No homicidal thoughts, intent, plan is appreciated by this provider on this date.      TREATMENT PLAN: Continue supportive psychotherapy efforts and medications as indicated.   Pharmacological and Non-Pharmacological treatment options discussed during today's visit. Patient/Guardian acknowledged and verbally consented with current treatment plan and was educated on the importance of compliance with treatment and follow-up appointments.      MEDICATION ISSUES:  Discussed medication options " and treatment plan of prescribed medication as well as the risks, benefits, any black box warnings, and side effects including potential falls, possible impaired driving, and metabolic adversities among others. Patient is agreeable to call the office with any worsening of symptoms or onset of side effects, or if any concerns or questions arise.  The contact information for the office is made available to the patient. Patient is agreeable to call 911 or go to the nearest ER should they begin having any SI/HI, or if any urgent concerns arise. No medication side effects or related complaints today.     Increase Adderall xr to 20mg every morning.     MEDS ORDERED DURING VISIT:  New Medications Ordered This Visit   Medications   • amphetamine-dextroamphetamine XR (Adderall XR) 20 MG 24 hr capsule     Sig: Take 1 capsule by mouth Every Morning     Dispense:  30 capsule     Refill:  0     Dosage change       Follow Up Appointment:   Return in about 27 days (around 10/3/2022) for Recheck, Video visit.               This document has been electronically signed by MELONY Reich  September 6, 2022 16:50 EDT    Please note that portions of this note were completed with a voice recognition program. Efforts were made to edit dictation, but occasionally words are mistranscribed.

## 2022-09-08 ENCOUNTER — DOCUMENTATION (OUTPATIENT)
Dept: FAMILY MEDICINE CLINIC | Facility: CLINIC | Age: 37
End: 2022-09-08

## 2022-09-08 RX ORDER — FLUCONAZOLE 150 MG/1
150 TABLET ORAL ONCE
Qty: 3 TABLET | Refills: 0 | Status: SHIPPED | OUTPATIENT
Start: 2022-09-08 | End: 2022-09-08

## 2022-10-03 ENCOUNTER — TELEMEDICINE (OUTPATIENT)
Dept: PSYCHIATRY | Facility: CLINIC | Age: 37
End: 2022-10-03

## 2022-10-03 DIAGNOSIS — F90.2 ATTENTION DEFICIT HYPERACTIVITY DISORDER, COMBINED TYPE: ICD-10-CM

## 2022-10-03 PROCEDURE — 99213 OFFICE O/P EST LOW 20 MIN: CPT | Performed by: NURSE PRACTITIONER

## 2022-10-03 RX ORDER — DEXTROAMPHETAMINE SACCHARATE, AMPHETAMINE ASPARTATE MONOHYDRATE, DEXTROAMPHETAMINE SULFATE AND AMPHETAMINE SULFATE 5; 5; 5; 5 MG/1; MG/1; MG/1; MG/1
20 CAPSULE, EXTENDED RELEASE ORAL EVERY MORNING
Qty: 30 CAPSULE | Refills: 0 | Status: SHIPPED | OUTPATIENT
Start: 2022-10-03 | End: 2022-11-14 | Stop reason: SDUPTHER

## 2022-10-03 NOTE — PROGRESS NOTES
"This provider is located at [facility], [address] [place] [address], [facility], [address] using a secure MyChart Video Visit through [email or web address]. Patient is being seen remotely via telehealth at their home address in [place], and stated they are in a secure environment for this session. The patient's condition being diagnosed/treated is appropriate for telemedicine. The provider identified herself as well as her credentials.   The patient, and/or patients guardian, consent to be seen remotely, and when consent is given they understand that the consent allows for patient identifiable information to be sent to a third party as needed.   They may refuse to be seen remotely at any time. The electronic data is encrypted and password protected, and the patient and/or guardian has been advised of the potential risks to privacy not withstanding such measures.   PT Identifiers used: Name and .    You have chosen to receive care through a telehealth visit.  Do you consent to use a video/audio connection for your medical care today? Yes      Subjective   Christie Griffith is a 37 y.o. female who presents today for follow up for medication management    Chief Complaint:  \"ADHD\"     History of Present Illness:    History of Present Illness  Patient reported from her work site in [facility] for appointment.  She is a  at [employer] elementary school.   Adderall XR was increased to 20 mg at most recent appointment a month ago.  Patient reports she noticed a definite improvement in symptom management, she is able to stay on task, able to focus and concentrate, the medication wears off later in the day which helps her with post classroom tasks.  Sleep and appetite are reported to be good.  Patient is not having any side effects.            Last Menstrual Period:  IUD    The following portions of the patient's history were reviewed and updated as appropriate: allergies, current medications, past family history, past " medical history, past social history, past surgical history and problem list.    Past Psychiatric History:  Patient reports only taking the Wellbutrin, no history of admissions, no history of self harming behaviors, no SI, HI, hallucinations or psychosis.  She reports she did have postpartum depression and managed to work through it but it was very difficult.    Past Medical History:  Past Medical History:   Diagnosis Date   • Generalized anxiety disorder    • PONV (postoperative nausea and vomiting)    • Psoriasis    • Varicella 1990       Substance Abuse History:   Types:Denies all, including illicit      Social History:  Social History     Socioeconomic History   • Marital status:      Spouse name: Aurelio   • Number of children: 1   • Highest education level: Master's degree (e.g., MA, MS, Emma, MEd, MSW, JUSTINA)   Tobacco Use   • Smoking status: Never Smoker   • Smokeless tobacco: Never Used   Substance and Sexual Activity   • Alcohol use: Not Currently     Alcohol/week: 0.0 standard drinks   • Drug use: Never   • Sexual activity: Yes     Birth control/protection: Implant   Patient reports good support system with family and friends.  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  Reports Judaism belief system.  Denies any history of  service or any legal issues.    Family History:  Family History   Problem Relation Age of Onset   • Lymphoma Mother    • Diabetes Mother    • Heart disease Mother    • Coronary artery disease Father    • Atrial fibrillation Father    • Heart disease Father    • Thyroid disease Other    • Heart murmur Daughter        Past Surgical History:  Past Surgical History:   Procedure Laterality Date   • BILATERAL BREAST REDUCTION     • MOUTH SURGERY      x 4 dental implants   • WISDOM TOOTH EXTRACTION         Problem List:  Patient Active Problem List   Diagnosis   • Malaise and fatigue    • Hypoglycemia   • Influenza-like illness   • Overweight   • Obesity due to excess calories without serious comorbidity       Allergy:   Allergies   Allergen Reactions   • Transderm-Scop [Scopolamine] Dizziness        Current Medications:   Current Outpatient Medications   Medication Sig Dispense Refill   • amphetamine-dextroamphetamine XR (Adderall XR) 20 MG 24 hr capsule Take 1 capsule by mouth Every Morning 30 capsule 0   • doxycycline (VIBRAMYCIN) 100 MG capsule Take 1 capsule by mouth 2 (Two) Times a Day. 28 capsule 0     No current facility-administered medications for this visit.       Review of Systems:    Review of Systems   All other systems reviewed and are negative.        Physical Exam:   Physical Exam  Vitals reviewed.   Constitutional:       Appearance: Normal appearance.   HENT:      Head: Normocephalic.   Neurological:      Mental Status: She is alert.   Psychiatric:         Attention and Perception: Attention and perception normal.         Mood and Affect: Mood and affect normal.         Speech: Speech normal.         Behavior: Behavior normal. Behavior is cooperative.         Thought Content: Thought content normal.         Cognition and Memory: Cognition and memory normal.         Judgment: Judgment normal.      Comments:           Vitals:  not currently breastfeeding. There is no height or weight on file to calculate BMI.  Due to extenuating circumstances and possible current health risks associated with the patient being present in a clinical setting (with current health restrictions in place in regards to possible COVID 19 transmission/exposure), the patient was seen remotely today via a MyChart Video Visit through [facility] and telephone encounter.  Unable to obtain vital signs due to nature of remote visit.  Height stated at 67  inches.  Weight stated at 182 pounds.    Last 3 Blood Pressure Readings:  BP Readings from Last 3 Encounters:   11/09/21 118/74   10/28/21 130/80   10/22/20 120/80        PHQ-9 Score:   PHQ-9 Total Score: (P) 1  PHQ-9 Depression Screening  Little interest or pleasure in doing things? (P) 0   Feeling down, depressed, or hopeless? (P) 0   Trouble falling or staying asleep, or sleeping too much? (P) 0   Feeling tired or having little energy? (P) 0   Poor appetite or overeating? (P) 0   Feeling bad about yourself - or that you are a failure or have let yourself or your family down? (P) 1   Trouble concentrating on things, such as reading the newspaper or watching television? (P) 0   Moving or speaking so slowly that other people could have noticed? Or the opposite - being so fidgety or restless that you have been moving around a lot more than usual? (P) 0   Thoughts that you would be better off dead, or of hurting yourself in some way? (P) 0   PHQ-9 Total Score (P) 1   If you checked off any problems, how difficult have these problems made it for you to do your work, take care of things at home, or get along with other people? (P) Not difficult at all     PHQ-9 Total Score: (P) 1      Feeling nervous, anxious or on edge: (P) Several days  Not being able to stop or control worrying: (P) Not at all  Worrying too much about different things: (P) Not at all  Trouble Relaxing: (P) Several days  Being so restless that it is hard to sit still: (P) Not at all  Feeling afraid as if something awful might happen: (P) Not at all  Becoming easily annoyed or irritable: (P) Several days  JALEESA 7 Total Score: (P) 3  If you checked any problems, how difficult have these problems made it for you to do your work, take care of things at home, or get along with other people: (P) Not difficult at all      PROMIS scale screening tool that patient filled out virtually reviewed by this APRN at today's encounter.      Mental Status Exam:   Hygiene:   good  Cooperation:  Cooperative  Eye Contact:  Good  Psychomotor Behavior:  Appropriate  Affect:  Full range  Mood: euthymic  Hopelessness: Denies  Speech:   Normal  Thought Process:  Goal directed and Linear  Thought Content:  Normal  Suicidal:  None  Homicidal:  None  Hallucinations:  None  Delusion:  None  Memory:  Intact  Orientation:  Person, Place, Time and Situation  Reliability:  good  Insight:  Good  Judgement:  Good  Impulse Control:  Good  Physical/Medical Issues:  No        Lab Results:   No visits with results within 1 Month(s) from this visit.   Latest known visit with results is:   Orders Only on 08/24/2022   Component Date Value Ref Range Status   • RMSF IgG 08/24/2022 Positive (A) Negative Final   • RMSF IgM 08/24/2022 0.31  0.00 - 0.89 index Final                                     Negative        <0.90                                   Equivocal 0.90 - 1.10                                   Positive        >1.10   • RMSF IgG 08/24/2022 <1:64  Neg <1:64 Final                                 Negative           <1:64                               Positive            1:64                               Recent/Active      >1:64  Titers of 1:64 are suggestive of past or possible  current infection. Titers >1:64 are suggestive of  recent or active infection. Approximately 9% of  specimens positive by EIA screen are negative by IFA.       Assessment & Plan   Diagnoses and all orders for this visit:    1. Attention deficit hyperactivity disorder, combined type  -     amphetamine-dextroamphetamine XR (Adderall XR) 20 MG 24 hr capsule; Take 1 capsule by mouth Every Morning  Dispense: 30 capsule; Refill: 0        Visit Diagnoses:    ICD-10-CM ICD-9-CM   1. Attention deficit hyperactivity disorder, combined type  F90.2 314.01         GOALS:  Short Term Goals: Patient will be compliant with medication, and patient will have no significant medication related side effects.  Patient will be engaged in psychotherapy as indicated.  Patient will report subjective improvement of symptoms.  Long term goals: To stabilize mood and treat/improve subjective symptoms, the  "patient will stay out of the hospital, the patient will be at an optimal level of functioning, and the patient will take all medications as prescribed.  The patient/guardian verbalized understanding and agreement with goals that were mutually set.    RISK ASSESSMENT  Current harm-to-self risk is reported by pt as \"none.\"  Current [id number] risk is reported by pt as \"none.\"   No suicidal thoughts, intent, plan is appreciated by this provider on this date of exam.   No homicidal thoughts, intent, plan is appreciated by this provider on this date.      TREATMENT PLAN: Continue supportive psychotherapy efforts and medications as indicated.   Pharmacological and Non-Pharmacological treatment options discussed during today's visit. Patient/Guardian acknowledged and verbally consented with current treatment plan and was educated on the importance of compliance with treatment and follow-up appointments.      MEDICATION ISSUES:  Discussed medication options and treatment plan of prescribed medication as well as the risks, benefits, any black box warnings, and side effects including potential falls, possible impaired driving, and metabolic adversities among others. Patient is agreeable to call the office with any worsening of symptoms or onset of side effects, or if any concerns or questions arise.  The contact information for the office is made available to the patient. Patient is agreeable to call 911 or go to the nearest ER should they begin having any SI/HI, or if any urgent concerns arise. No medication side effects or related complaints today.     Continue with Adderall XR 20 mg every morning.  Follow up in 3 months.  Patient is educated to call 1 week before she is due for refill to ensure provider has enough time to send in this medication.    MEDS ORDERED DURING VISIT:  New Medications Ordered This Visit   Medications   • amphetamine-dextroamphetamine XR (Adderall XR) 20 MG 24 hr capsule     Sig: Take 1 capsule " by mouth Every Morning     Dispense:  30 capsule     Refill:  0       Follow Up Appointment:   Return in about 3 months (around 12/28/2022) for Recheck, Video visit.               This document has been electronically signed by MELONY Reich  October 3, 2022 16:44 EDT    Please note that portions of this note were completed with a voice recognition program. Efforts were made to edit dictation, but occasionally words are mistranscribed.

## 2022-11-14 DIAGNOSIS — F90.2 ATTENTION DEFICIT HYPERACTIVITY DISORDER, COMBINED TYPE: ICD-10-CM

## 2022-11-14 NOTE — TELEPHONE ENCOUNTER
Pt called for med refills. Pt did state that she does not have enough meds to get through her next appointment.

## 2022-11-15 RX ORDER — DEXTROAMPHETAMINE SACCHARATE, AMPHETAMINE ASPARTATE MONOHYDRATE, DEXTROAMPHETAMINE SULFATE AND AMPHETAMINE SULFATE 5; 5; 5; 5 MG/1; MG/1; MG/1; MG/1
20 CAPSULE, EXTENDED RELEASE ORAL EVERY MORNING
Qty: 30 CAPSULE | Refills: 0 | Status: SHIPPED | OUTPATIENT
Start: 2022-11-15 | End: 2022-12-19 | Stop reason: SDUPTHER

## 2022-12-19 DIAGNOSIS — F90.2 ATTENTION DEFICIT HYPERACTIVITY DISORDER, COMBINED TYPE: ICD-10-CM

## 2022-12-19 RX ORDER — DEXTROAMPHETAMINE SACCHARATE, AMPHETAMINE ASPARTATE MONOHYDRATE, DEXTROAMPHETAMINE SULFATE AND AMPHETAMINE SULFATE 5; 5; 5; 5 MG/1; MG/1; MG/1; MG/1
20 CAPSULE, EXTENDED RELEASE ORAL EVERY MORNING
Qty: 30 CAPSULE | Refills: 0 | Status: SHIPPED | OUTPATIENT
Start: 2022-12-19 | End: 2023-01-18 | Stop reason: SDUPTHER

## 2022-12-28 ENCOUNTER — TELEMEDICINE (OUTPATIENT)
Dept: PSYCHIATRY | Facility: CLINIC | Age: 37
End: 2022-12-28

## 2022-12-28 DIAGNOSIS — F90.2 ATTENTION DEFICIT HYPERACTIVITY DISORDER, COMBINED TYPE: Primary | ICD-10-CM

## 2022-12-28 PROCEDURE — 99213 OFFICE O/P EST LOW 20 MIN: CPT | Performed by: NURSE PRACTITIONER

## 2022-12-28 NOTE — PROGRESS NOTES
"This provider is located at [facility], [address] [place] [address], [facility], [address] using a secure [employer] Video Visit through [employer]. Patient is being seen remotely via telehealth at their home address in [place], and stated they are in a secure environment for this session. The patient's condition being diagnosed/treated is appropriate for telemedicine. The provider identified herself as well as her credentials.   The patient, and/or patients guardian, consent to be seen remotely, and when consent is given they understand that the consent allows for patient identifiable information to be sent to a third party as needed.   They may refuse to be seen remotely at any time. The electronic data is encrypted and password protected, and the patient and/or guardian has been advised of the potential risks to privacy not withstanding such measures.   PT Identifiers used: Name and .    You have chosen to receive care through a telehealth visit.  Do you consent to use a video/audio connection for your medical care today? Yes      Subjective   Christie Griffith is a 37 y.o. female who presents today for follow up for medication management    Chief Complaint:  \"ADHD\"     History of Present Illness:    History of Present Illness  Patient reported continued positive results from Adderall XR 20 mg daily.  She is currently on winter break from her school's task.  She is a teacher at an elementary school.  Reports she is much more engaged and focused, she is more organized.  She does not feel as overwhelmed with tasks that she has.  She reports the last 2 weeks prior to winter break can be chaotic, however she felt like she handled it better than in the past.  No side effects are reported from medication.  Sleep and appetite are reported to be good.            Last Menstrual Period:  IUD    The following portions of the patient's history were reviewed and updated as appropriate: allergies, current medications, past family history, " past medical history, past social history, past surgical history and problem list.    Past Psychiatric History:  Patient reports only taking the Wellbutrin, no history of admissions, no history of self harming behaviors, no SI, HI, hallucinations or psychosis.  She reports she did have postpartum depression and managed to work through it but it was very difficult.    Past Medical History:  Past Medical History:   Diagnosis Date   • ADHD (attention deficit hyperactivity disorder) 7-22   • Generalized anxiety disorder    • PONV (postoperative nausea and vomiting)    • Psoriasis    • Varicella 1990       Substance Abuse History:   Types:Denies all, including illicit      Social History:  Social History     Socioeconomic History   • Marital status:      Spouse name: Aurelio   • Number of children: 1   • Highest education level: Master's degree (e.g., MA, MS, Emma, MEd, MSW, JUSTINA)   Tobacco Use   • Smoking status: Never   • Smokeless tobacco: Never   Substance and Sexual Activity   • Alcohol use: Not Currently     Alcohol/week: 0.0 standard drinks   • Drug use: Never   • Sexual activity: Yes     Birth control/protection: Implant   Patient reports good support system with family and friends.  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  Reports Spiritism belief system.  Denies any history of  service or any legal issues.    Family History:  Family History   Problem Relation Age of Onset   • Lymphoma Mother    • Diabetes Mother    • Heart disease Mother    • Coronary artery disease Father    • Atrial fibrillation Father    • Heart disease Father    • Thyroid disease Other    • Heart murmur Daughter        Past Surgical History:  Past Surgical History:   Procedure Laterality Date   • BILATERAL BREAST REDUCTION     • BREAST SURGERY  01-09   • MOUTH SURGERY      x 4 dental implants   • WISDOM TOOTH EXTRACTION          Problem List:  Patient Active Problem List   Diagnosis   • Malaise and fatigue   • Hypoglycemia   • Influenza-like illness   • Overweight   • Obesity due to excess calories without serious comorbidity       Allergy:   Allergies   Allergen Reactions   • Transderm-Scop [Scopolamine] Dizziness        Current Medications:   Current Outpatient Medications   Medication Sig Dispense Refill   • amphetamine-dextroamphetamine XR (Adderall XR) 20 MG 24 hr capsule Take 1 capsule by mouth Every Morning 30 capsule 0     No current facility-administered medications for this visit.       Review of Systems:    Review of Systems   All other systems reviewed and are negative.        Physical Exam:   Physical Exam  Vitals reviewed.   Constitutional:       Appearance: Normal appearance.   HENT:      Head: Normocephalic.   Neurological:      Mental Status: She is alert.   Psychiatric:         Attention and Perception: Attention and perception normal.         Mood and Affect: Mood and affect normal.         Speech: Speech normal.         Behavior: Behavior normal. Behavior is cooperative.         Thought Content: Thought content normal.         Cognition and Memory: Cognition and memory normal.         Judgment: Judgment normal.      Comments:           Vitals:  not currently breastfeeding. There is no height or weight on file to calculate BMI.  Due to extenuating circumstances and possible current health risks associated with the patient being present in a clinical setting (with current health restrictions in place in regards to possible COVID 19 transmission/exposure), the patient was seen remotely today via a MyChart Video Visit through [facility] and telephone encounter.  Unable to obtain vital signs due to nature of remote visit.  Height stated at 67  inches.  Weight stated at 182 pounds.    Last 3 Blood Pressure Readings:  BP Readings from Last 3 Encounters:   11/09/21 118/74   10/28/21 130/80   10/22/20 120/80       PHQ-9 Score:    PHQ-9 Total Score:    PHQ-9 Depression Screening  Little interest or pleasure in doing things?     Feeling down, depressed, or hopeless?     Trouble falling or staying asleep, or sleeping too much?     Feeling tired or having little energy?     Poor appetite or overeating?     Feeling bad about yourself - or that you are a failure or have let yourself or your family down?     Trouble concentrating on things, such as reading the newspaper or watching television?     Moving or speaking so slowly that other people could have noticed? Or the opposite - being so fidgety or restless that you have been moving around a lot more than usual?     Thoughts that you would be better off dead, or of hurting yourself in some way?     PHQ-9 Total Score     If you checked off any problems, how difficult have these problems made it for you to do your work, take care of things at home, or get along with other people?       PHQ-9 Total Score:              Mental Status Exam:   Hygiene:   good  Cooperation:  Cooperative  Eye Contact:  Good  Psychomotor Behavior:  Appropriate  Affect:  Full range  Mood: euthymic  Hopelessness: Denies  Speech:  Normal  Thought Process:  Goal directed and Linear  Thought Content:  Normal  Suicidal:  None  Homicidal:  None  Hallucinations:  None  Delusion:  None  Memory:  Intact  Orientation:  Person, Place, Time and Situation  Reliability:  good  Insight:  Good  Judgement:  Good  Impulse Control:  Good  Physical/Medical Issues:  No        Lab Results:   No visits with results within 1 Month(s) from this visit.   Latest known visit with results is:   Orders Only on 08/24/2022   Component Date Value Ref Range Status   • RMSF IgG 08/24/2022 Positive (A)  Negative Final   • RMSF IgM 08/24/2022 0.31  0.00 - 0.89 index Final                                     Negative        <0.90                                   Equivocal 0.90 - 1.10                                   Positive        >1.10   • RMSF IgG  "08/24/2022 <1:64  Neg <1:64 Final                                 Negative           <1:64                               Positive            1:64                               Recent/Active      >1:64  Titers of 1:64 are suggestive of past or possible  current infection. Titers >1:64 are suggestive of  recent or active infection. Approximately 9% of  specimens positive by EIA screen are negative by IFA.       Assessment & Plan   Diagnoses and all orders for this visit:    1. Attention deficit hyperactivity disorder, combined type (Primary)        Visit Diagnoses:    ICD-10-CM ICD-9-CM   1. Attention deficit hyperactivity disorder, combined type  F90.2 314.01         GOALS:  Short Term Goals: Patient will be compliant with medication, and patient will have no significant medication related side effects.  Patient will be engaged in psychotherapy as indicated.  Patient will report subjective improvement of symptoms.  Long term goals: To stabilize mood and treat/improve subjective symptoms, the patient will stay out of the hospital, the patient will be at an optimal level of functioning, and the patient will take all medications as prescribed.  The patient/guardian verbalized understanding and agreement with goals that were mutually set.    RISK ASSESSMENT  Current harm-to-self risk is reported by pt as \"none.\"  Current [id number] risk is reported by pt as \"none.\"   No suicidal thoughts, intent, plan is appreciated by this provider on this date of exam.   No homicidal thoughts, intent, plan is appreciated by this provider on this date.      TREATMENT PLAN: Continue supportive psychotherapy efforts and medications as indicated.   Pharmacological and Non-Pharmacological treatment options discussed during today's visit. Patient/Guardian acknowledged and verbally consented with current treatment plan and was educated on the importance of compliance with treatment and follow-up appointments.      MEDICATION " ISSUES:  Discussed medication options and treatment plan of prescribed medication as well as the risks, benefits, any black box warnings, and side effects including potential falls, possible impaired driving, and metabolic adversities among others. Patient is agreeable to call the office with any worsening of symptoms or onset of side effects, or if any concerns or questions arise.  The contact information for the office is made available to the patient. Patient is agreeable to call 911 or go to the nearest ER should they begin having any SI/HI, or if any urgent concerns arise. No medication side effects or related complaints today.     Continue with Adderall XR 20 mg every morning.  Follow up in 3 months.  Patient is educated to call 1 week before she is due for refill to ensure provider has enough time to send in this medication.    MEDS ORDERED DURING VISIT:  No orders of the defined types were placed in this encounter.  No refill needed today.    Follow Up Appointment:   Return in about 3 months (around 4/3/2023) for Recheck, Video visit.               This document has been electronically signed by MELONY Reich  December 29, 2022 08:01 EST    Please note that portions of this note were completed with a voice recognition program. Efforts were made to edit dictation, but occasionally words are mistranscribed.

## 2023-01-12 ENCOUNTER — CLINICAL SUPPORT (OUTPATIENT)
Dept: FAMILY MEDICINE CLINIC | Facility: CLINIC | Age: 38
End: 2023-01-12
Payer: COMMERCIAL

## 2023-01-12 DIAGNOSIS — Z23 NEED FOR VACCINATION: Primary | ICD-10-CM

## 2023-01-12 PROCEDURE — 90686 IIV4 VACC NO PRSV 0.5 ML IM: CPT | Performed by: NURSE PRACTITIONER

## 2023-01-12 PROCEDURE — 90471 IMMUNIZATION ADMIN: CPT | Performed by: NURSE PRACTITIONER

## 2023-01-16 ENCOUNTER — OFFICE VISIT (OUTPATIENT)
Dept: OBSTETRICS AND GYNECOLOGY | Facility: CLINIC | Age: 38
End: 2023-01-16
Payer: COMMERCIAL

## 2023-01-16 VITALS
SYSTOLIC BLOOD PRESSURE: 130 MMHG | BODY MASS INDEX: 27.31 KG/M2 | DIASTOLIC BLOOD PRESSURE: 78 MMHG | HEIGHT: 67 IN | WEIGHT: 174 LBS

## 2023-01-16 DIAGNOSIS — N63.11 MASS OF UPPER OUTER QUADRANT OF RIGHT BREAST: Primary | ICD-10-CM

## 2023-01-16 PROBLEM — R53.81 MALAISE AND FATIGUE: Status: RESOLVED | Noted: 2017-04-27 | Resolved: 2023-01-16

## 2023-01-16 PROBLEM — E66.3 OVERWEIGHT: Status: RESOLVED | Noted: 2019-03-13 | Resolved: 2023-01-16

## 2023-01-16 PROBLEM — E16.2 HYPOGLYCEMIA: Status: RESOLVED | Noted: 2017-04-27 | Resolved: 2023-01-16

## 2023-01-16 PROBLEM — R53.83 MALAISE AND FATIGUE: Status: RESOLVED | Noted: 2017-04-27 | Resolved: 2023-01-16

## 2023-01-16 PROBLEM — E66.09 OBESITY DUE TO EXCESS CALORIES WITHOUT SERIOUS COMORBIDITY: Status: RESOLVED | Noted: 2019-05-16 | Resolved: 2023-01-16

## 2023-01-16 PROBLEM — J11.1 INFLUENZA-LIKE ILLNESS: Status: RESOLVED | Noted: 2019-02-20 | Resolved: 2023-01-16

## 2023-01-16 PROCEDURE — 99214 OFFICE O/P EST MOD 30 MIN: CPT | Performed by: OBSTETRICS & GYNECOLOGY

## 2023-01-16 NOTE — PROGRESS NOTES
[facility]  Gynecology  Date of Service: 2023    CC: Breast knot    HPI  Christie Griffith is a 37 y.o.  premenopausal female who presents with complaints of breast knot.      She states that she noticed a knot on her right breast in the shower.  She states that she noticed it on the right outer breast.  She denies any other skin changes.    ROS  Review of Systems   Constitutional: Negative.    HENT: Negative.    Eyes: Negative.    Respiratory: Negative.    Cardiovascular: Negative.    Gastrointestinal: Negative.    Endocrine: Negative.    Genitourinary: Negative.    Musculoskeletal: Negative.    Skin: Negative.    Allergic/Immunologic: Negative.    Neurological: Negative.    Hematological: Negative.    Psychiatric/Behavioral: Negative.      GYN HISTORY  Menarche: age 12  Menses: None  History of STIs: None  Last pap smear: 2021  Abnormal pap smear history: None  Contraception: Mirena IUD (10/2016)     OB HISTORY  OB History    Para Term  AB Living   1 1 1     1   SAB IAB Ectopic Molar Multiple Live Births             1      # Outcome Date GA Lbr Jake/2nd Weight Sex Delivery Anes PTL Lv   1 Term  38w0d  2948 g (6 lb 8 oz) F Vag-Spont   TARAH     PAST MEDICAL HISTORY  Past Medical History:   Diagnosis Date   • ADHD (attention deficit hyperactivity disorder)    • Generalized anxiety disorder    • PONV (postoperative nausea and vomiting)    • Psoriasis    • Varicella      PAST SURGICAL HISTORY  Past Surgical History:   Procedure Laterality Date   • BILATERAL BREAST REDUCTION     • BREAST SURGERY     • MOUTH SURGERY      x 4 dental implants   • WISDOM TOOTH EXTRACTION       FAMILY HISTORY  Family History   Problem Relation Age of Onset   • Lymphoma Mother    • Diabetes Mother    • Heart disease Mother    • Coronary artery disease Father    • Atrial fibrillation Father    • Heart disease Father    • Thyroid disease Other    • Heart murmur Daughter   "    SOCIAL HISTORY  Social History     Socioeconomic History   • Marital status:      Spouse name: Aurelio   • Number of children: 1   • Highest education level: Master's degree (e.g., MA, MS, Emma, MEd, MSW, JUSTINA)   Tobacco Use   • Smoking status: Never   • Smokeless tobacco: Never   Substance and Sexual Activity   • Alcohol use: Not Currently   • Drug use: Never   • Sexual activity: Yes     Partners: Male     Birth control/protection: I.U.D., Implant     ALLERGIES  Allergies   Allergen Reactions   • Transderm-Scop [Scopolamine] Dizziness     HOME MEDICATIONS  Prior to Admission medications    Medication Sig Start Date End Date Taking? Authorizing Provider   amphetamine-dextroamphetamine XR (Adderall XR) 20 MG 24 hr capsule Take 1 capsule by mouth Every Morning 12/19/22  Yes Whit Paulino APRN     PE  /78 (BP Location: Left arm, Patient Position: Sitting, Cuff Size: Adult)   Ht 170.2 cm (67\")   Wt 78.9 kg (174 lb)   LMP  (LMP Unknown)   BMI 27.25 kg/m²        General: Alert, healthy, no distress, well nourished and well developed.  Neurologic: Alert, oriented to person, place, and time.  Gait normal.  Cranial nerves II-XII grossly intact.  HEENT: Normocephalic, atraumatic.  Extraocular muscles intact, pupils equal and reactive times two.    Neck: Supple, no adenopathy, thyroid normal size, non-tender, without nodularity, trachea midline.  Breasts: No masses, skin dimpling, skin retraction, nipple discharge, or asymmetry on the left breast.  On the right breast, 1.5 cm slightly fixed nodule palpated at 9 o'clock approximately 5 cm from the nipple.  Otherwise, no skin dimpling or retraction.  Well-healed scars noted bilaterally from breast reduction.  Lungs: Normal respiratory effort.  Clear to auscultation bilaterally.  No wheezes, rhonci, or rales.  Heart: Regular rate and rhythm.  No murmer, rub or gallop.  Abdomen: Soft, non-tender, non-distended,no masses, no hepatosplenomegaly, no " hernia.  Skin: No rash, no lesions.  Extremities: No cyanosis, clubbing or edema.    IMPRESSION  Christie Griffith is a 37 y.o.  presenting with R breast mass; given age, suspect fibroadenoma but discussed possibility of malignancy.  Will evaluate further and possibly need referral to General Surgery pending findings from imaging.    PLAN    1. Mass of upper outer quadrant of right breast  - US Breast Right Limited; Future  - Mammo Diagnostic Bilateral With CAD; Future         This document has been electronically signed by Yancy Quan MD on 2023 16:46 CST.

## 2023-01-18 DIAGNOSIS — F90.2 ATTENTION DEFICIT HYPERACTIVITY DISORDER, COMBINED TYPE: ICD-10-CM

## 2023-01-18 RX ORDER — DEXTROAMPHETAMINE SACCHARATE, AMPHETAMINE ASPARTATE MONOHYDRATE, DEXTROAMPHETAMINE SULFATE AND AMPHETAMINE SULFATE 5; 5; 5; 5 MG/1; MG/1; MG/1; MG/1
20 CAPSULE, EXTENDED RELEASE ORAL EVERY MORNING
Qty: 30 CAPSULE | Refills: 0 | Status: SHIPPED | OUTPATIENT
Start: 2023-01-18 | End: 2023-02-24 | Stop reason: SDUPTHER

## 2023-01-20 DIAGNOSIS — F90.2 ATTENTION DEFICIT HYPERACTIVITY DISORDER, COMBINED TYPE: ICD-10-CM

## 2023-01-20 RX ORDER — DEXTROAMPHETAMINE SACCHARATE, AMPHETAMINE ASPARTATE MONOHYDRATE, DEXTROAMPHETAMINE SULFATE AND AMPHETAMINE SULFATE 5; 5; 5; 5 MG/1; MG/1; MG/1; MG/1
20 CAPSULE, EXTENDED RELEASE ORAL EVERY MORNING
Qty: 30 CAPSULE | Refills: 0 | Status: CANCELLED | OUTPATIENT
Start: 2023-01-20

## 2023-01-27 ENCOUNTER — OFFICE VISIT (OUTPATIENT)
Dept: SURGERY | Facility: CLINIC | Age: 38
End: 2023-01-27
Payer: COMMERCIAL

## 2023-01-27 VITALS
DIASTOLIC BLOOD PRESSURE: 74 MMHG | BODY MASS INDEX: 26.84 KG/M2 | HEIGHT: 67 IN | SYSTOLIC BLOOD PRESSURE: 124 MMHG | TEMPERATURE: 97.1 F | WEIGHT: 171 LBS | HEART RATE: 113 BPM

## 2023-01-27 DIAGNOSIS — N63.11 MASS OF UPPER OUTER QUADRANT OF RIGHT BREAST: Primary | ICD-10-CM

## 2023-01-27 PROCEDURE — 99203 OFFICE O/P NEW LOW 30 MIN: CPT | Performed by: SURGERY

## 2023-01-27 RX ORDER — LIDOCAINE HYDROCHLORIDE AND EPINEPHRINE 10; 10 MG/ML; UG/ML
20 INJECTION, SOLUTION INFILTRATION; PERINEURAL ONCE
Status: CANCELLED | OUTPATIENT
Start: 2023-01-27 | End: 2023-01-27

## 2023-01-27 RX ORDER — DIAZEPAM 5 MG/1
5 TABLET ORAL ONCE
Status: CANCELLED | OUTPATIENT
Start: 2023-01-27 | End: 2023-01-27

## 2023-01-27 NOTE — PROGRESS NOTES
Subjective   Christie Griffith is a 37 y.o. female     Chief Complaint: Right breast mass    History of Present Illness referred by Dr. Quan from gynecology after patient presented with a approximate 3-month history of a palpable mass lateral aspect of her right breast.  Prior breast surgery was reduction in 2009.  Patient underwent first bilateral mammogram this morning and also had an ultrasound of the right breast.  Lesion in question is an irregular hypoechoic solid mass that is 2.4 x 1.6 x 1.5 cm in size.  This mass is located at 9 o'clock position approxi-7 cm from the nipple.  No family history.  No nipple discharge.    Review of Systems   Constitutional: Negative.    HENT: Negative.    Eyes: Negative.    Respiratory: Negative.    Cardiovascular: Negative.    Gastrointestinal: Negative.    Endocrine: Negative.    Genitourinary: Negative.    Musculoskeletal: Negative.    Skin: Negative.    Allergic/Immunologic: Negative.    Neurological: Negative.    Hematological: Negative.    Psychiatric/Behavioral: Negative.      Past Medical History:   Diagnosis Date   • ADHD (attention deficit hyperactivity disorder) 7-22   • Generalized anxiety disorder    • PONV (postoperative nausea and vomiting)    • Psoriasis    • Varicella 1990     Past Surgical History:   Procedure Laterality Date   • BILATERAL BREAST REDUCTION  01/2009   • MOUTH SURGERY      x 4 dental implants   • REDUCTION MAMMAPLASTY     • WISDOM TOOTH EXTRACTION       Family History   Problem Relation Age of Onset   • Lymphoma Mother    • Diabetes Mother    • Heart disease Mother    • Coronary artery disease Father    • Atrial fibrillation Father    • Heart disease Father    • Thyroid disease Other    • Heart murmur Daughter      Social History     Socioeconomic History   • Marital status:      Spouse name: Aurelio   • Number of children: 1   • Highest education level: Master's degree (e.g., MA, MS, Emma, MEd, MSW, JUSTINA)   Tobacco Use   • Smoking status:  Never   • Smokeless tobacco: Never   Vaping Use   • Vaping Use: Never used   Substance and Sexual Activity   • Alcohol use: Not Currently   • Drug use: Never   • Sexual activity: Yes     Partners: Male     Birth control/protection: I.U.D., Implant     Allergies   Allergen Reactions   • Transderm-Scop [Scopolamine] Dizziness     Vitals:    01/27/23 1459   BP: 124/74   Pulse: 113   Temp: 97.1 °F (36.2 °C)       Home Medications:  Prior to Admission medications    Medication Sig Start Date End Date Taking? Authorizing Provider   amphetamine-dextroamphetamine XR (Adderall XR) 20 MG 24 hr capsule Take 1 capsule by mouth Every Morning 1/18/23  Yes Whit Paulino APRN       Objective   Physical Exam  Constitutional:       General: She is not in acute distress.     Appearance: She is well-developed.   HENT:      Head: Normocephalic and atraumatic.   Eyes:      General: No scleral icterus.     Conjunctiva/sclera: Conjunctivae normal.   Neck:      Thyroid: No thyromegaly.      Trachea: No tracheal deviation.   Cardiovascular:      Rate and Rhythm: Normal rate and regular rhythm.      Heart sounds: Normal heart sounds. No murmur heard.    No friction rub. No gallop.   Pulmonary:      Effort: Pulmonary effort is normal. No respiratory distress.      Breath sounds: Normal breath sounds. No stridor. No wheezing or rales.   Chest:      Chest wall: No tenderness.       Abdominal:      General: Bowel sounds are normal. There is no distension.      Palpations: Abdomen is soft. There is no mass.      Tenderness: There is no abdominal tenderness. There is no guarding or rebound.      Hernia: No hernia is present.   Musculoskeletal:         General: No deformity. Normal range of motion.      Cervical back: Normal range of motion and neck supple.   Lymphadenopathy:      Cervical: No cervical adenopathy.   Skin:     General: Skin is warm and dry.      Coloration: Skin is not pale.      Findings: No erythema or rash.      Nails:  There is no clubbing.   Neurological:      Mental Status: She is alert and oriented to person, place, and time.   Psychiatric:         Behavior: Behavior normal.         Thought Content: Thought content normal.     Reviewed mammograms and ultrasound with patient and her  answered all her questions    Assessment & Plan Hypoechoic mass lateral aspect of right breast I agree that should undergo biopsy.  Recommend ultrasound-guided right breast mammotome biopsy and leave a clip.  I fully discussed the procedure alternatives risk and benefits with the patient.  We also talked about excisional biopsy if she had any concerns about residual mass being present after a core needle biopsy.  She clearly understands her options and wishes to proceed with core needle biopsy and would be okay with any type of residual mass as long as she knew what the pathology was.      The encounter diagnosis was Mass of upper outer quadrant of right breast.                     This document has been electronically signed by Bruce Charles MD on January 27, 2023 16:06 CST

## 2023-02-01 ENCOUNTER — HOSPITAL ENCOUNTER (OUTPATIENT)
Dept: ULTRASOUND IMAGING | Facility: HOSPITAL | Age: 38
Discharge: HOME | End: 2023-02-01
Admitting: SURGERY
Payer: COMMERCIAL

## 2023-02-01 VITALS
WEIGHT: 168.65 LBS | BODY MASS INDEX: 26.47 KG/M2 | OXYGEN SATURATION: 100 % | HEIGHT: 67 IN | SYSTOLIC BLOOD PRESSURE: 159 MMHG | HEART RATE: 101 BPM | TEMPERATURE: 99.2 F | DIASTOLIC BLOOD PRESSURE: 97 MMHG | RESPIRATION RATE: 18 BRPM

## 2023-02-01 DIAGNOSIS — N63.11 MASS OF UPPER OUTER QUADRANT OF RIGHT BREAST: ICD-10-CM

## 2023-02-01 PROCEDURE — 88360 TUMOR IMMUNOHISTOCHEM/MANUAL: CPT

## 2023-02-01 PROCEDURE — 19083 BX BREAST 1ST LESION US IMAG: CPT | Performed by: SURGERY

## 2023-02-01 PROCEDURE — A4648 IMPLANTABLE TISSUE MARKER: HCPCS

## 2023-02-01 PROCEDURE — 88305 TISSUE EXAM BY PATHOLOGIST: CPT

## 2023-02-01 RX ORDER — LIDOCAINE HYDROCHLORIDE AND EPINEPHRINE 10; 10 MG/ML; UG/ML
20 INJECTION, SOLUTION INFILTRATION; PERINEURAL ONCE
Status: DISCONTINUED | OUTPATIENT
Start: 2023-02-01 | End: 2023-02-02 | Stop reason: HOSPADM

## 2023-02-01 RX ORDER — IBUPROFEN 600 MG/1
600 TABLET ORAL EVERY 6 HOURS PRN
Status: DISCONTINUED | OUTPATIENT
Start: 2023-02-01 | End: 2023-02-02 | Stop reason: HOSPADM

## 2023-02-01 RX ORDER — DIAZEPAM 5 MG/1
5 TABLET ORAL ONCE
Status: COMPLETED | OUTPATIENT
Start: 2023-02-01 | End: 2023-02-01

## 2023-02-01 RX ADMIN — DIAZEPAM 5 MG: 5 TABLET ORAL at 06:43

## 2023-02-03 ENCOUNTER — OFFICE VISIT (OUTPATIENT)
Dept: SURGERY | Facility: CLINIC | Age: 38
End: 2023-02-03
Payer: COMMERCIAL

## 2023-02-03 ENCOUNTER — PATIENT OUTREACH (OUTPATIENT)
Dept: ONCOLOGY | Facility: CLINIC | Age: 38
End: 2023-02-03
Payer: COMMERCIAL

## 2023-02-03 VITALS
BODY MASS INDEX: 26.37 KG/M2 | HEIGHT: 67 IN | WEIGHT: 168 LBS | TEMPERATURE: 98.1 F | OXYGEN SATURATION: 100 % | DIASTOLIC BLOOD PRESSURE: 80 MMHG | HEART RATE: 125 BPM | SYSTOLIC BLOOD PRESSURE: 170 MMHG

## 2023-02-03 DIAGNOSIS — C50.411 MALIGNANT NEOPLASM OF UPPER-OUTER QUADRANT OF RIGHT FEMALE BREAST, UNSPECIFIED ESTROGEN RECEPTOR STATUS: Primary | ICD-10-CM

## 2023-02-03 PROCEDURE — 99213 OFFICE O/P EST LOW 20 MIN: CPT | Performed by: SURGERY

## 2023-02-03 NOTE — PROGRESS NOTES
37-year-old female here to follow-up after her right breast ultrasound mammotome biopsy.  Went over pathology with Dr. Grullon and he had reviewed this with a second pathologist and this patient has invasive ductal cancer.  ER and RI and Herceptin results are pending at this time.  I informed the patient of these findings.  Her  was present and breast navigator Nurse Susan was present and offered support.  We explained the pathology to her.  Discussed breast conserving therapy versus mastectomy.  We explained to her that hormonal status is pending as well as Herceptin is important that we get those results back before we proceed.  Due to her age think that it is also important she had genetic evaluation and we will set that up early next week and that may influence our recommendations for surgery as well.  We encouraged her  to go home and think about this over the weekend and we will see her again on Monday for further recommendations and answer any other questions that she may have and get the genetic evaluation portion    started.

## 2023-02-03 NOTE — SIGNIFICANT NOTE
ONCOLOGY PATIENT NAVIGATION     DIAGNOSIS: Breast Cancer  REFERRAL SOURCE: [facility]  ENCOUNTER TYPE: In-Person     NOTE: Spoke to pt and spouse today in person concerning newly diagnosed breast cancer with Dr. Charles. After lengthy discussion with pt referral for genetic counseling and testing will be done prior to surgical decision. Pt will also rtc Monday 2/6/23 with Dr. Chalres and myself to discuss complete pathology and ongoing plan of care. Nurse Navigator referral received and present at today's visit to offer support, education and care coordination as needed.      EDUCATION PROVIDED: Informed pt of my role as the Breast Oncology Nurse Navigator in cancer care and my support to pt and family. Provided pt with written education in the Breast Cancer Treatment Handbook and supportive community and national resources. Pt was engaged and attentive to all discussion but was tearful and stated fears and concerns. Emotional support and comfort provided. Allowed time for questions and answers for pt. Informed pt on team navigation approach offered at [facility] to include nurse navigators, oncology social worker, dietician, nurses, physicians, etc. Encouraged pt to review handbook for additional education needed prior to next appt.    RESOURCE INFORMATION PROVIDED: Breast Cancer Treatment Handbook, [facility] Cancer Support Network, [facility] Lymphedema Prevention Program, [facility] Prehabilitation and Rehabilitation to include Physical and Occupational Therapy, [place] [facility] Pathfinder Resource, Massage Therapy Integrative Services, Cancer Nutrition, Living Beyond Breast Cancer Website, American Cancer Society Website, and Survivorship Care     PLAN OF CARE: Provided my contact information to pt and encouraged to call me anytime with concerns or questions. Will f/u with pt at next office visit or before as needed to further discuss for needs and care coordination in breast  cancer care. Will call pt on Monday 2/6 with genetic counseling appt and details of plan. Pt and spouse stated understanding and denied any further questions today.

## 2023-02-06 DIAGNOSIS — C50.411 MALIGNANT NEOPLASM OF UPPER-OUTER QUADRANT OF RIGHT FEMALE BREAST, UNSPECIFIED ESTROGEN RECEPTOR STATUS: Primary | ICD-10-CM

## 2023-02-08 ENCOUNTER — PATIENT OUTREACH (OUTPATIENT)
Dept: ONCOLOGY | Facility: CLINIC | Age: 38
End: 2023-02-08
Payer: COMMERCIAL

## 2023-02-08 ENCOUNTER — CLINICAL SUPPORT (OUTPATIENT)
Dept: GENETICS | Facility: HOSPITAL | Age: 38
End: 2023-02-08
Payer: COMMERCIAL

## 2023-02-08 ENCOUNTER — OFFICE VISIT (OUTPATIENT)
Dept: SURGERY | Facility: CLINIC | Age: 38
End: 2023-02-08
Payer: COMMERCIAL

## 2023-02-08 VITALS
HEIGHT: 67 IN | TEMPERATURE: 96.9 F | WEIGHT: 165 LBS | SYSTOLIC BLOOD PRESSURE: 138 MMHG | DIASTOLIC BLOOD PRESSURE: 88 MMHG | BODY MASS INDEX: 25.9 KG/M2 | HEART RATE: 123 BPM

## 2023-02-08 DIAGNOSIS — Z80.42 FAMILY HISTORY OF MALIGNANT NEOPLASM OF PROSTATE: ICD-10-CM

## 2023-02-08 DIAGNOSIS — Z13.79 GENETIC TESTING: Primary | ICD-10-CM

## 2023-02-08 DIAGNOSIS — Z17.1 MALIGNANT NEOPLASM OF RIGHT BREAST IN FEMALE, ESTROGEN RECEPTOR NEGATIVE, UNSPECIFIED SITE OF BREAST: ICD-10-CM

## 2023-02-08 DIAGNOSIS — Z80.3 FAMILY HISTORY OF MALIGNANT NEOPLASM OF BREAST: ICD-10-CM

## 2023-02-08 DIAGNOSIS — C50.411 MALIGNANT NEOPLASM OF UPPER-OUTER QUADRANT OF RIGHT FEMALE BREAST, UNSPECIFIED ESTROGEN RECEPTOR STATUS: Primary | ICD-10-CM

## 2023-02-08 DIAGNOSIS — C50.911 MALIGNANT NEOPLASM OF RIGHT BREAST IN FEMALE, ESTROGEN RECEPTOR NEGATIVE, UNSPECIFIED SITE OF BREAST: ICD-10-CM

## 2023-02-08 PROCEDURE — 99213 OFFICE O/P EST LOW 20 MIN: CPT | Performed by: SURGERY

## 2023-02-08 NOTE — SIGNIFICANT NOTE
ONCOLOGY PATIENT NAVIGATION    Follow up with pt today accompanying Dr. Charles. Lengthy discussion regarding pt pathology and plan of care recs per Dr. Charles. Referral placed for medical oncology to further discuss oncology care prior to surgery. Informed pt I will arrange referral. Pt is seeing GC today as previously scheduled and I will provide med onc appt today prior to d/c. Discussed oncology cancer clinic logistics and expectations.     Encouraged pt to continue to reach out to me anytime with concerns or questions and for pt to continue to utilize breast cancer treatment handbook for written education throughout context of breast cancer care and breast cancer resources. Reinforced my support to pt and family and contact info.     Pt stated understanding of plan of care and denied further questions today.

## 2023-02-08 NOTE — PROGRESS NOTES
Christie Griffith, a 37-year-old female, was seen for genetic counseling due to a personal history of breast cancer. Genetic counseling was provided via telemedicine at [facility]. Ms. Griffith was recently diagnosed with an ER negative right breast cancer at age 37. She is in the process of making a surgical decision. Ms. Griffith retains her uterus and ovaries. She has never had a colonoscopy. She was interested in discussing her risk for a hereditary cancer syndrome.  Ms. Griffith was interested in pursuing a multi-gene panel to evaluate her risk, therefore the CancerNext panel was ordered through [facility] which analyzes BRCA1/2 and 34 additional genes associated with an increased cancer risk. Results from the high/moderate risk breast cancer genes are expected in 10 days, and results from the remainder of the panel are expected in 2-3 weeks.    PERTINENT FAMILY HISTORY: (See attached pedigree)   Mother:   Hodgkin Lymphoma, 39  Mat. Great Aunt (x3):  Breast cancer  Mat. Great Grandmother; Uterine cancer  Pat. Uncle:   Prostate cancer  Pat. Great Aunt:  Breast cancer  Pat. Grandfather:  Lung cancer, 52  Pat. Great Aunt:  Breast cancer    Records regarding the family history were not provided for review.     RISK ASSESSMENT:  Ms. Griffith’s personal and family history of breast cancer led to concern for a hereditary cancer syndrome. We discussed BRCA1/2 testing as well as the option of pursuing a panel that would test for other genes known to impact cancer risk in addition to BRCA1/2.   Ms. Griffith clearly meets NCCN guidelines criteria for BRCA1/2 testing based on her personal history of breast cancer diagnosed before age 50.  These risk assessments are based on the family history information provided at the time of the appointment, and could change in the future should new information be obtained.    GENETIC COUNSELING: (30 minutes) We reviewed the family history information in detail.  Cases of cancer follow three general patterns: sporadic, familial, and hereditary.  While most cancer is sporadic, some cases appear to occur in family clusters.  These cases are said to be familial and account for 10-20% of cancer cases.  Familial cases may be due to a combination of shared genes and environmental factors among family members.  In even fewer families (5-10%), the cancer is said to be inherited, and the genes responsible for the cancer are known.      Family histories typical of hereditary cancer syndromes usually include multiple first- and second-degree relatives diagnosed with cancer types that define a syndrome.  These cases tend to be diagnosed at younger-than-expected ages and can be bilateral or multifocal.  The cancer in these families follows an autosomal dominant inheritance pattern, which indicates the likely presence of a mutation in a cancer susceptibility gene.  Children and siblings of an individual believed to carry this mutation have a 50% chance of inheriting that mutation, thereby inheriting the increased risk to develop cancer.  These mutations can be passed down from the maternal or the paternal lineage.    Hereditary breast cancer accounts for 5-10% of all cases of breast cancer.  A significant proportion of hereditary breast and ovarian cancer can be attributed to mutations in the BRCA1 and BRCA2 genes.  Mutations in these genes confer an increased risk for breast cancer, ovarian cancer, male breast cancer, prostate cancer, and pancreatic cancer. Women with a BRCA1 or BRCA2 mutation who have already been diagnosed with breast cancer have a 40-60% lifetime risk of a second breast cancer. Women with a BRCA1 or BRCA2 mutation have up to a 44% risk of ovarian cancer.      There are other genes that are known to be associated with an increased risk for cancer.  Some of these genes have well defined cancer risks and established management guidelines.  Other genes that can be tested  for have been more recently described, and there may be less data regarding the risks and therefore may not have established management guidelines. We discussed these limitations at length.  Based on Ms. Griffith’s desire to get as much information as possible regarding her personal risks and potential risks for her family, she opted to pursue testing through a panel that would look at several other genes known to increase the risk for cancer.    GENETIC TESTING:  The risks, benefits and limitations of genetic testing and implications for clinical management following testing were reviewed.  DNA test results can influence decisions regarding screening, prevention and surgical management.  Genetic testing can have significant psychological implications for both individuals and families.  Also discussed was the possibility of employment and insurance discrimination based on genetic test results and the laws in place to prevent this ([place]).    We discussed panel testing, which would involve testing for BRCA1/2 as well as 34 additional genes that are associated with increased cancer risk. The benefits and limitations of genetic testing were discussed and Ms. Griffith decided to pursue testing via the panel. The implications of a positive or negative test result were discussed. We discussed the possibility that, in some cases, genetic test results may be informative or may be ambiguous due to the identification of a genetic variant. These variants may or may not be associated with an increased cancer risk.  With multigene panel testing, it is not uncommon for a variant of uncertain significance (VUS) to be identified.  If a VUS is identified, testing family members is typically not recommended and screening recommendations are made based on the family history.  The laboratories that perform genetic testing work to reclassify the VUS and send out an amended report if and when a VUS is reclassified.  The majority of variant  findings are ultimately reclassified to a negative result.  Given her personal and family history, a negative test result would not eliminate all cancer risk to her relatives, although the risk would not be as high as it would with positive genetic testing.      PLAN: Genetic testing via the CancerNext panel through [facility] was ordered. Results from the high/moderate risk breast cancer genes are expected in 7-10 days, and results from the remainder of the panel are expected in 2-3 weeks. Ms. Griffith is welcome to contact us in the meantime with any questions she may have at [phone].       Yancy Galicia MS, [facility] [place]  Licensed Certified Genetic Counselor

## 2023-02-12 NOTE — PROGRESS NOTES
REASON FOR CONSULTATION:  Breast cancer   Provide an opinion on any further workup or treatment                             REQUESTING PHYSICIAN:  Bruce Hernandez MD     RECORDS OBTAINED:  Records of the patients history including those obtained from the referring provider were reviewed and summarized in detail.      History of Present Illness     This is a pleasant 37 year old female who was seen in consultation att the request of Dr Hernandez for evaluation of breast cancer. Patient felt mass in her right breast in 10/2022 and was subsequently referred to Dr. Quan on January 16, 2023.  Dr. Quan ordered diagnostic mammogram with right breast ultrasound. This showed 2.44 x 1.65 x 1.55 cm hypoechoic solid mass with irregular borders. She was evaluated by Dr hernandez on 1/27/23 and biopsy was recommended. She underwent biopsy on 2/1/23. This showed poorly differentiated invasive ductal carcinoma. Tumor was negative for ER, positive for GA  Positive(1%), negative for HER2 negative (1+). She has been referred to me further evaluation and management of her breast cancer.       Past Medical History:   Diagnosis Date   • ADHD (attention deficit hyperactivity disorder) 7-22   • Generalized anxiety disorder    • Malignant neoplasm of upper-outer quadrant of right female breast (HCC) 2/3/2023   • PONV (postoperative nausea and vomiting)    • Psoriasis    • Varicella 1990        Past Surgical History:   Procedure Laterality Date   • BILATERAL BREAST REDUCTION  01/2009   • MOUTH SURGERY      x 4 dental implants   • REDUCTION MAMMAPLASTY     • WISDOM TOOTH EXTRACTION          Current Outpatient Medications on File Prior to Visit   Medication Sig Dispense Refill   • amphetamine-dextroamphetamine XR (Adderall XR) 20 MG 24 hr capsule Take 1 capsule by mouth Every Morning 30 capsule 0     No current facility-administered medications on file prior to visit.        ALLERGIES:    Allergies   Allergen Reactions   • Transderm-Scop  [Scopolamine] Dizziness        Social History     Socioeconomic History   • Marital status:      Spouse name: Aurelio   • Number of children: 1   • Highest education level: Master's degree (e.g., MA, MS, Emma, MEd, MSW, JUSTINA)   Tobacco Use   • Smoking status: Never   • Smokeless tobacco: Never   Vaping Use   • Vaping Use: Never used   Substance and Sexual Activity   • Alcohol use: Not Currently   • Drug use: Never   • Sexual activity: Yes     Partners: Male     Birth control/protection: I.U.D., Implant        Family History   Problem Relation Age of Onset   • Lymphoma Mother    • Diabetes Mother    • Heart disease Mother    • Coronary artery disease Father    • Atrial fibrillation Father    • Heart disease Father    • Thyroid disease Other    • Heart murmur Daughter             Objective     Vitals:    02/13/23 1133   BP: 170/84   Pulse: 105   SpO2: 98%         Physical Exam  Vitals and nursing note reviewed. Exam conducted with a chaperone present.   Constitutional:       Appearance: Normal appearance.   Skin:     Comments: Right breast mass + . No axillary palpable nodes    Neurological:      General: No focal deficit present.      Mental Status: She is alert and oriented to person, place, and time. Mental status is at baseline.   Psychiatric:         Mood and Affect: Mood normal.         Behavior: Behavior normal.         Thought Content: Thought content normal.               RECENT LABS:Independently reviewed and summarized  Hematology WBC   Date Value Ref Range Status   08/24/2022 7.49 3.40 - 10.80 10*3/mm3 Final     RBC   Date Value Ref Range Status   08/24/2022 4.49 3.77 - 5.28 10*6/mm3 Final     Hemoglobin   Date Value Ref Range Status   08/24/2022 13.7 12.0 - 15.9 g/dL Final     Hematocrit   Date Value Ref Range Status   08/24/2022 40.2 34.0 - 46.6 % Final     Platelets   Date Value Ref Range Status   08/24/2022 359 140 - 450 10*3/mm3 Final        WBC   Date Value Ref Range Status   02/13/2023 10.07  3.40 - 10.80 10*3/mm3 Final     RBC   Date Value Ref Range Status   02/13/2023 4.92 3.77 - 5.28 10*6/mm3 Final     Hemoglobin   Date Value Ref Range Status   02/13/2023 15.1 12.0 - 15.9 g/dL Final     Hematocrit   Date Value Ref Range Status   02/13/2023 42.1 34.0 - 46.6 % Final     MCV   Date Value Ref Range Status   02/13/2023 85.6 79.0 - 97.0 fL Final     MCH   Date Value Ref Range Status   02/13/2023 30.7 26.6 - 33.0 pg Final     MCHC   Date Value Ref Range Status   02/13/2023 35.9 (H) 31.5 - 35.7 g/dL Final     RDW   Date Value Ref Range Status   02/13/2023 12.3 12.3 - 15.4 % Final     RDW-SD   Date Value Ref Range Status   02/13/2023 38.0 37.0 - 54.0 fl Final     MPV   Date Value Ref Range Status   02/13/2023 10.8 6.0 - 12.0 fL Final     Platelets   Date Value Ref Range Status   02/13/2023 456 (H) 140 - 450 10*3/mm3 Final     Neutrophil %   Date Value Ref Range Status   02/13/2023 65.1 42.7 - 76.0 % Final     Lymphocyte %   Date Value Ref Range Status   02/13/2023 26.6 19.6 - 45.3 % Final     Monocyte %   Date Value Ref Range Status   02/13/2023 6.3 5.0 - 12.0 % Final     Eosinophil %   Date Value Ref Range Status   02/13/2023 0.4 0.3 - 6.2 % Final     Basophil %   Date Value Ref Range Status   02/13/2023 1.2 0.0 - 1.5 % Final     Immature Grans %   Date Value Ref Range Status   02/13/2023 0.4 0.0 - 0.5 % Final     Neutrophils, Absolute   Date Value Ref Range Status   02/13/2023 6.56 1.70 - 7.00 10*3/mm3 Final     Lymphocytes, Absolute   Date Value Ref Range Status   02/13/2023 2.68 0.70 - 3.10 10*3/mm3 Final     Monocytes, Absolute   Date Value Ref Range Status   02/13/2023 0.63 0.10 - 0.90 10*3/mm3 Final     Eosinophils, Absolute   Date Value Ref Range Status   02/13/2023 0.04 0.00 - 0.40 10*3/mm3 Final     Basophils, Absolute   Date Value Ref Range Status   02/13/2023 0.12 0.00 - 0.20 10*3/mm3 Final     Immature Grans, Absolute   Date Value Ref Range Status   02/13/2023 0.04 0.00 - 0.05 10*3/mm3 Final      [facility]   Date Value Ref Range Status   02/13/2023 0.0 0.0 - 0.2 /100 WBC Final       Lab Results   Component Value Date    GLUCOSE 97 02/13/2023    BUN 12 02/13/2023    CREATININE 0.69 02/13/2023    EGFR 114.8 02/13/2023    BCR 17.4 02/13/2023    K 3.7 02/13/2023    CO2 27.0 02/13/2023    CALCIUM 9.9 02/13/2023    ALBUMIN 5.1 02/13/2023    AST 14 02/13/2023    ALT 15 02/13/2023         Imaging (independently reviewed and summarized):     US Breast Right Limited    Result Date: 1/27/2023  Right breast 9:00 position 7 cm from nipple palpable solid nodule described above which would be suspicious for possible malignancy. Recommend biopsy to further evaluate. BI-RADS category 4: Suspicious abnormality Recommendation: Ultrasound  biopsy. Electronically signed by:  Pola Wharton MD  1/27/2023 2:27 PM CST Workstation: [id number]    Mammo diagnostic digital tomosynthesis bilateral w CAD    Result Date: 1/27/2023  Right breast 9:00 position 7 cm from nipple palpable solid nodule described above which would be suspicious for possible malignancy. Recommend biopsy to further evaluate. BI-RADS category 4: Suspicious abnormality Recommendation: Ultrasound  biopsy. Electronically signed by:  Pola Wharton MD  1/27/2023 2:27 PM CST Workstation: [id number]       Pathology (result reviewed):   2/1/23    DIAGNOSIS:   BREAST, BIOPSY, NEEDLE CORES, RIGHT:   Invasive ductal carcinoma, poorly differentiated (3+3+2)   Negative for estrogen receptors   Positive for progesterone receptors (1%)   Negative for HER2 overexpression (1+)       Diagnosis:   (1) Right breast cancer        This is a new diagnosis for me.     Assessment & Plan     Extensive old medical records reviewed.   Dr Quan and Dr hernandez consultation notes reviewed.   Biopsy reports reviewed.   Case also personally discussed with Dr Laguerre (pathology) and Dr Hernandez (surgery).     Patient with diagnosis of 2.4 cm right breast lesion without any clinical palpable  adenopathy.  She was estrogen receptor negative, progesterone receptor weakly positive at 1%, HER2 negative.  Tumor was poorly differentiated.    I had a very lengthy discussion with patient and multiple family members about her diagnosis, prognosis and treatment options.    This is behaving more like triple negative breast cancer and I am not entirely certain whether progesterone receptor at 1% in the absence of estrogen receptor positivity would qualify her as hormone receptor positive breast cancer.  I would certainly like to obtain second opinion on pathology to make sure she is truly hormone receptor negative.  There is also data that suggest that low estrogen receptor negative progesterone receptor weak positive tumor behaves more like triple negative malignancy.    I had a lengthy discussion with patient and family about role of chemotherapy in her case.  Rationale for adjuvant versus neoadjuvant chemotherapy approach discussed at length.  Risk versus benefit of each approach were discussed as well.  After lengthy discussion patient and family leaning towards neoadjuvant chemotherapy.    I would like to obtain PET scan to complete staging work-up.  I would like to check a CBC and CMP.    I do recommend proceeding with chemotherapy port placement in preparation for systemic chemotherapy.    She does not have any other significant comorbidities.  I will obtain echocardiogram to evaluate LV ejection fraction prior to anthracycline chemotherapy.    She already had genetic testing performed.  We will await the result of genetic testing before making definitive surgical plans.    I would like to obtain result of PET scan before finalizing her treatment plan.  I will plan to see her back next week to discuss her chemotherapy options.      Recommendations:   · Discussed diagnosis, prognosis and treatment options at length.   · PET scan for staging purpose.   · Recommend echo to evaluate LVEF.   · Rationale for  neoadjuvant versus adjuvant chemo at length. Pros and cons of each approach discussed at length.   · Second opinion on pathology to clarify hormone receptor status.   · Recommend chemotherapy port placement.   · Chemo regimen discussed at length. Will meet after PET scan to finalize treatment plan.       I spent 70 minutes caring for Christie on this date of service. This time includes time spent by me in the following activities: preparing for the visit, reviewing tests, obtaining and/or reviewing a separately obtained history, performing a medically appropriate examination and/or evaluation, counseling and educating the patient/family/caregiver, ordering medications, tests, or procedures, referring and communicating with other health care professionals, documenting information in the medical record, independently interpreting results and communicating that information with the patient/family/caregiver and care coordination

## 2023-02-13 ENCOUNTER — CONSULT (OUTPATIENT)
Dept: ONCOLOGY | Facility: CLINIC | Age: 38
End: 2023-02-13
Payer: COMMERCIAL

## 2023-02-13 ENCOUNTER — LAB (OUTPATIENT)
Dept: ONCOLOGY | Facility: HOSPITAL | Age: 38
End: 2023-02-13
Payer: COMMERCIAL

## 2023-02-13 VITALS
WEIGHT: 165.3 LBS | BODY MASS INDEX: 25.94 KG/M2 | HEART RATE: 105 BPM | SYSTOLIC BLOOD PRESSURE: 170 MMHG | DIASTOLIC BLOOD PRESSURE: 84 MMHG | HEIGHT: 67 IN | OXYGEN SATURATION: 98 %

## 2023-02-13 DIAGNOSIS — Z79.899 OTHER LONG TERM (CURRENT) DRUG THERAPY: ICD-10-CM

## 2023-02-13 DIAGNOSIS — C50.411 MALIGNANT NEOPLASM OF UPPER-OUTER QUADRANT OF RIGHT FEMALE BREAST, UNSPECIFIED ESTROGEN RECEPTOR STATUS: Primary | ICD-10-CM

## 2023-02-13 DIAGNOSIS — C50.411 MALIGNANT NEOPLASM OF UPPER-OUTER QUADRANT OF RIGHT FEMALE BREAST, UNSPECIFIED ESTROGEN RECEPTOR STATUS: ICD-10-CM

## 2023-02-13 LAB
ALBUMIN SERPL-MCNC: 5.1 G/DL (ref 3.5–5.2)
ALBUMIN/GLOB SERPL: 1.8 G/DL
ALP SERPL-CCNC: 82 U/L (ref 39–117)
ALT SERPL W P-5'-P-CCNC: 15 U/L (ref 1–33)
ANION GAP SERPL CALCULATED.3IONS-SCNC: 9 MMOL/L (ref 5–15)
AST SERPL-CCNC: 14 U/L (ref 1–32)
BASOPHILS # BLD AUTO: 0.12 10*3/MM3 (ref 0–0.2)
BASOPHILS NFR BLD AUTO: 1.2 % (ref 0–1.5)
BILIRUB SERPL-MCNC: 0.7 MG/DL (ref 0–1.2)
BUN SERPL-MCNC: 12 MG/DL (ref 6–20)
BUN/CREAT SERPL: 17.4 (ref 7–25)
CALCIUM SPEC-SCNC: 9.9 MG/DL (ref 8.6–10.5)
CHLORIDE SERPL-SCNC: 104 MMOL/L (ref 98–107)
CO2 SERPL-SCNC: 27 MMOL/L (ref 22–29)
CREAT SERPL-MCNC: 0.69 MG/DL (ref 0.57–1)
DEPRECATED RDW RBC AUTO: 38 FL (ref 37–54)
EGFRCR SERPLBLD CKD-EPI 2021: 114.8 ML/MIN/1.73
EOSINOPHIL # BLD AUTO: 0.04 10*3/MM3 (ref 0–0.4)
EOSINOPHIL NFR BLD AUTO: 0.4 % (ref 0.3–6.2)
ERYTHROCYTE [DISTWIDTH] IN BLOOD BY AUTOMATED COUNT: 12.3 % (ref 12.3–15.4)
GLOBULIN UR ELPH-MCNC: 2.9 GM/DL
GLUCOSE SERPL-MCNC: 97 MG/DL (ref 65–99)
HCT VFR BLD AUTO: 42.1 % (ref 34–46.6)
HGB BLD-MCNC: 15.1 G/DL (ref 12–15.9)
HOLD SPECIMEN: NORMAL
IMM GRANULOCYTES # BLD AUTO: 0.04 10*3/MM3 (ref 0–0.05)
IMM GRANULOCYTES NFR BLD AUTO: 0.4 % (ref 0–0.5)
LYMPHOCYTES # BLD AUTO: 2.68 10*3/MM3 (ref 0.7–3.1)
LYMPHOCYTES NFR BLD AUTO: 26.6 % (ref 19.6–45.3)
MCH RBC QN AUTO: 30.7 PG (ref 26.6–33)
MCHC RBC AUTO-ENTMCNC: 35.9 G/DL (ref 31.5–35.7)
MCV RBC AUTO: 85.6 FL (ref 79–97)
MONOCYTES # BLD AUTO: 0.63 10*3/MM3 (ref 0.1–0.9)
MONOCYTES NFR BLD AUTO: 6.3 % (ref 5–12)
NEUTROPHILS NFR BLD AUTO: 6.56 10*3/MM3 (ref 1.7–7)
NEUTROPHILS NFR BLD AUTO: 65.1 % (ref 42.7–76)
NRBC BLD AUTO-RTO: 0 /100 WBC (ref 0–0.2)
PLATELET # BLD AUTO: 456 10*3/MM3 (ref 140–450)
PMV BLD AUTO: 10.8 FL (ref 6–12)
POTASSIUM SERPL-SCNC: 3.7 MMOL/L (ref 3.5–5.2)
PROT SERPL-MCNC: 8 G/DL (ref 6–8.5)
RBC # BLD AUTO: 4.92 10*6/MM3 (ref 3.77–5.28)
SODIUM SERPL-SCNC: 140 MMOL/L (ref 136–145)
WBC NRBC COR # BLD: 10.07 10*3/MM3 (ref 3.4–10.8)

## 2023-02-13 PROCEDURE — 99205 OFFICE O/P NEW HI 60 MIN: CPT | Performed by: INTERNAL MEDICINE

## 2023-02-13 PROCEDURE — G0463 HOSPITAL OUTPT CLINIC VISIT: HCPCS | Performed by: INTERNAL MEDICINE

## 2023-02-13 PROCEDURE — 85025 COMPLETE CBC W/AUTO DIFF WBC: CPT

## 2023-02-13 PROCEDURE — 80053 COMPREHEN METABOLIC PANEL: CPT

## 2023-02-13 RX ORDER — BUPIVACAINE HCL/0.9 % NACL/PF 0.1 %
2 PLASTIC BAG, INJECTION (ML) EPIDURAL ONCE
Status: CANCELLED | OUTPATIENT
Start: 2023-02-16 | End: 2023-02-13

## 2023-02-13 RX ORDER — SODIUM CHLORIDE 0.9 % (FLUSH) 0.9 %
10 SYRINGE (ML) INJECTION EVERY 12 HOURS SCHEDULED
Status: CANCELLED | OUTPATIENT
Start: 2023-02-16

## 2023-02-13 RX ORDER — SODIUM CHLORIDE 9 MG/ML
40 INJECTION, SOLUTION INTRAVENOUS AS NEEDED
Status: CANCELLED | OUTPATIENT
Start: 2023-02-16

## 2023-02-13 RX ORDER — SODIUM CHLORIDE 0.9 % (FLUSH) 0.9 %
10 SYRINGE (ML) INJECTION AS NEEDED
Status: CANCELLED | OUTPATIENT
Start: 2023-02-16

## 2023-02-14 NOTE — H&P (VIEW-ONLY)
Patient and her family return today to discuss further management of her breast cancer.  I had reviewed pathology with the pathologist and medical oncology Dr. Nicole.  Feel that her tumor should be treated as a triple negative tumor.  The other issue is to strength the tumor if she would be more of a candidate for breast conserving therapy.  Breast navigator Nurse Susan was present and offered support as well.  Patient was to be evaluated by the  today and have blood test.  She understands it will take a few weeks for that to get back.  We will go ahead and tentatively set her up to see Dr. Nicole for medical oncology for his recommendations about positive neoadjuvant chemotherapy.  Went over Mediport placement and patient will call us if she wishes to be set up to have Mediport placed after she has been evaluated by Dr. Nicole.  Patient will follow up with us in a week otherwise

## 2023-02-16 ENCOUNTER — ANESTHESIA (OUTPATIENT)
Dept: PERIOP | Facility: HOSPITAL | Age: 38
End: 2023-02-16
Payer: COMMERCIAL

## 2023-02-16 ENCOUNTER — APPOINTMENT (OUTPATIENT)
Dept: GENERAL RADIOLOGY | Facility: HOSPITAL | Age: 38
End: 2023-02-16
Payer: COMMERCIAL

## 2023-02-16 ENCOUNTER — HOSPITAL ENCOUNTER (OUTPATIENT)
Facility: HOSPITAL | Age: 38
Setting detail: HOSPITAL OUTPATIENT SURGERY
Discharge: HOME | End: 2023-02-16
Attending: SURGERY | Admitting: SURGERY
Payer: COMMERCIAL

## 2023-02-16 ENCOUNTER — ANESTHESIA EVENT (OUTPATIENT)
Dept: PERIOP | Facility: HOSPITAL | Age: 38
End: 2023-02-16
Payer: COMMERCIAL

## 2023-02-16 ENCOUNTER — DOCUMENTATION (OUTPATIENT)
Dept: GENETICS | Facility: HOSPITAL | Age: 38
End: 2023-02-16
Payer: COMMERCIAL

## 2023-02-16 ENCOUNTER — TELEPHONE (OUTPATIENT)
Dept: GENETICS | Facility: HOSPITAL | Age: 38
End: 2023-02-16
Payer: COMMERCIAL

## 2023-02-16 VITALS
BODY MASS INDEX: 26.09 KG/M2 | TEMPERATURE: 97.7 F | DIASTOLIC BLOOD PRESSURE: 81 MMHG | HEIGHT: 67 IN | OXYGEN SATURATION: 100 % | HEART RATE: 70 BPM | SYSTOLIC BLOOD PRESSURE: 140 MMHG | RESPIRATION RATE: 18 BRPM | WEIGHT: 166.23 LBS

## 2023-02-16 DIAGNOSIS — C50.411 MALIGNANT NEOPLASM OF UPPER-OUTER QUADRANT OF RIGHT FEMALE BREAST, UNSPECIFIED ESTROGEN RECEPTOR STATUS: ICD-10-CM

## 2023-02-16 LAB — B-HCG UR QL: NEGATIVE

## 2023-02-16 PROCEDURE — 25010000002 PROPOFOL 200 MG/20ML EMULSION

## 2023-02-16 PROCEDURE — 76000 FLUOROSCOPY <1 HR PHYS/QHP: CPT

## 2023-02-16 PROCEDURE — 77001 FLUOROGUIDE FOR VEIN DEVICE: CPT | Performed by: SURGERY

## 2023-02-16 PROCEDURE — 81025 URINE PREGNANCY TEST: CPT | Performed by: ANESTHESIOLOGY

## 2023-02-16 PROCEDURE — 25010000002 MIDAZOLAM PER 1 MG

## 2023-02-16 PROCEDURE — 25010000002 KETOROLAC TROMETHAMINE PER 15 MG

## 2023-02-16 PROCEDURE — 25010000002 HEPARIN LOCK FLUSH PER 10 UNITS: Performed by: SURGERY

## 2023-02-16 PROCEDURE — 36561 INSERT TUNNELED CV CATH: CPT | Performed by: SURGERY

## 2023-02-16 PROCEDURE — 25010000002 CEFAZOLIN PER 500 MG: Performed by: SURGERY

## 2023-02-16 PROCEDURE — 25010000002 IOPAMIDOL 61 % SOLUTION: Performed by: SURGERY

## 2023-02-16 PROCEDURE — C1788 PORT, INDWELLING, IMP: HCPCS | Performed by: SURGERY

## 2023-02-16 PROCEDURE — 76937 US GUIDE VASCULAR ACCESS: CPT | Performed by: SURGERY

## 2023-02-16 PROCEDURE — 25010000002 FENTANYL CITRATE (PF) 100 MCG/2ML SOLUTION

## 2023-02-16 PROCEDURE — 25010000002 DEXAMETHASONE PER 1 MG

## 2023-02-16 PROCEDURE — 25010000002 ONDANSETRON PER 1 MG

## 2023-02-16 DEVICE — PRT INTRO VASC/INTERV VORTEX FILL/HL DETACH/POLYURET/CATH 8F: Type: IMPLANTABLE DEVICE | Site: CHEST | Status: FUNCTIONAL

## 2023-02-16 RX ORDER — NALOXONE HCL 0.4 MG/ML
0.4 VIAL (ML) INJECTION AS NEEDED
Status: DISCONTINUED | OUTPATIENT
Start: 2023-02-16 | End: 2023-02-16 | Stop reason: HOSPADM

## 2023-02-16 RX ORDER — FENTANYL CITRATE 50 UG/ML
INJECTION, SOLUTION INTRAMUSCULAR; INTRAVENOUS AS NEEDED
Status: DISCONTINUED | OUTPATIENT
Start: 2023-02-16 | End: 2023-02-16 | Stop reason: SURG

## 2023-02-16 RX ORDER — DIPHENHYDRAMINE HYDROCHLORIDE 50 MG/ML
12.5 INJECTION INTRAMUSCULAR; INTRAVENOUS
Status: DISCONTINUED | OUTPATIENT
Start: 2023-02-16 | End: 2023-02-16 | Stop reason: HOSPADM

## 2023-02-16 RX ORDER — ONDANSETRON 2 MG/ML
INJECTION INTRAMUSCULAR; INTRAVENOUS AS NEEDED
Status: DISCONTINUED | OUTPATIENT
Start: 2023-02-16 | End: 2023-02-16 | Stop reason: SURG

## 2023-02-16 RX ORDER — HYDROCODONE BITARTRATE AND ACETAMINOPHEN 5; 325 MG/1; MG/1
1 TABLET ORAL EVERY 6 HOURS PRN
Qty: 10 TABLET | Refills: 0 | Status: SHIPPED | OUTPATIENT
Start: 2023-02-16

## 2023-02-16 RX ORDER — ACETAMINOPHEN 325 MG/1
650 TABLET ORAL ONCE AS NEEDED
Status: DISCONTINUED | OUTPATIENT
Start: 2023-02-16 | End: 2023-02-16 | Stop reason: HOSPADM

## 2023-02-16 RX ORDER — ONDANSETRON 2 MG/ML
4 INJECTION INTRAMUSCULAR; INTRAVENOUS ONCE AS NEEDED
Status: DISCONTINUED | OUTPATIENT
Start: 2023-02-16 | End: 2023-02-16 | Stop reason: HOSPADM

## 2023-02-16 RX ORDER — HEPARIN SODIUM (PORCINE) LOCK FLUSH IV SOLN 100 UNIT/ML 100 UNIT/ML
SOLUTION INTRAVENOUS AS NEEDED
Status: DISCONTINUED | OUTPATIENT
Start: 2023-02-16 | End: 2023-02-16 | Stop reason: HOSPADM

## 2023-02-16 RX ORDER — DEXAMETHASONE SODIUM PHOSPHATE 4 MG/ML
INJECTION, SOLUTION INTRA-ARTICULAR; INTRALESIONAL; INTRAMUSCULAR; INTRAVENOUS; SOFT TISSUE AS NEEDED
Status: DISCONTINUED | OUTPATIENT
Start: 2023-02-16 | End: 2023-02-16 | Stop reason: SURG

## 2023-02-16 RX ORDER — SODIUM CHLORIDE 9 MG/ML
40 INJECTION, SOLUTION INTRAVENOUS AS NEEDED
Status: DISCONTINUED | OUTPATIENT
Start: 2023-02-16 | End: 2023-02-16 | Stop reason: HOSPADM

## 2023-02-16 RX ORDER — MEPERIDINE HYDROCHLORIDE 50 MG/ML
12.5 INJECTION INTRAMUSCULAR; INTRAVENOUS; SUBCUTANEOUS
Status: DISCONTINUED | OUTPATIENT
Start: 2023-02-16 | End: 2023-02-16 | Stop reason: HOSPADM

## 2023-02-16 RX ORDER — SODIUM CHLORIDE 0.9 % (FLUSH) 0.9 %
10 SYRINGE (ML) INJECTION EVERY 12 HOURS SCHEDULED
Status: DISCONTINUED | OUTPATIENT
Start: 2023-02-16 | End: 2023-02-16 | Stop reason: HOSPADM

## 2023-02-16 RX ORDER — SODIUM CHLORIDE 0.9 % (FLUSH) 0.9 %
10 SYRINGE (ML) INJECTION AS NEEDED
Status: DISCONTINUED | OUTPATIENT
Start: 2023-02-16 | End: 2023-02-16 | Stop reason: HOSPADM

## 2023-02-16 RX ORDER — FLUMAZENIL 0.1 MG/ML
0.2 INJECTION INTRAVENOUS AS NEEDED
Status: DISCONTINUED | OUTPATIENT
Start: 2023-02-16 | End: 2023-02-16 | Stop reason: HOSPADM

## 2023-02-16 RX ORDER — PROPOFOL 10 MG/ML
INJECTION, EMULSION INTRAVENOUS AS NEEDED
Status: DISCONTINUED | OUTPATIENT
Start: 2023-02-16 | End: 2023-02-16 | Stop reason: SURG

## 2023-02-16 RX ORDER — BUPIVACAINE HCL/0.9 % NACL/PF 0.1 %
2 PLASTIC BAG, INJECTION (ML) EPIDURAL ONCE
Status: COMPLETED | OUTPATIENT
Start: 2023-02-16 | End: 2023-02-16

## 2023-02-16 RX ORDER — MIDAZOLAM HYDROCHLORIDE 1 MG/ML
INJECTION INTRAMUSCULAR; INTRAVENOUS AS NEEDED
Status: DISCONTINUED | OUTPATIENT
Start: 2023-02-16 | End: 2023-02-16 | Stop reason: SURG

## 2023-02-16 RX ORDER — KETOROLAC TROMETHAMINE 15 MG/ML
INJECTION, SOLUTION INTRAMUSCULAR; INTRAVENOUS AS NEEDED
Status: DISCONTINUED | OUTPATIENT
Start: 2023-02-16 | End: 2023-02-16 | Stop reason: SURG

## 2023-02-16 RX ORDER — ACETAMINOPHEN 650 MG/1
650 SUPPOSITORY RECTAL ONCE AS NEEDED
Status: DISCONTINUED | OUTPATIENT
Start: 2023-02-16 | End: 2023-02-16 | Stop reason: HOSPADM

## 2023-02-16 RX ORDER — LIDOCAINE HYDROCHLORIDE 20 MG/ML
INJECTION, SOLUTION EPIDURAL; INFILTRATION; INTRACAUDAL; PERINEURAL AS NEEDED
Status: DISCONTINUED | OUTPATIENT
Start: 2023-02-16 | End: 2023-02-16 | Stop reason: SURG

## 2023-02-16 RX ORDER — EPHEDRINE SULFATE 50 MG/ML
5 INJECTION, SOLUTION INTRAVENOUS ONCE AS NEEDED
Status: DISCONTINUED | OUTPATIENT
Start: 2023-02-16 | End: 2023-02-16 | Stop reason: HOSPADM

## 2023-02-16 RX ORDER — SODIUM CHLORIDE, SODIUM GLUCONATE, SODIUM ACETATE, POTASSIUM CHLORIDE AND MAGNESIUM CHLORIDE 526; 502; 368; 37; 30 MG/100ML; MG/100ML; MG/100ML; MG/100ML; MG/100ML
1000 INJECTION, SOLUTION INTRAVENOUS CONTINUOUS PRN
Status: DISCONTINUED | OUTPATIENT
Start: 2023-02-16 | End: 2023-02-16 | Stop reason: HOSPADM

## 2023-02-16 RX ADMIN — MIDAZOLAM 2 MG: 1 INJECTION, SOLUTION INTRAMUSCULAR; INTRAVENOUS at 07:04

## 2023-02-16 RX ADMIN — SODIUM CHLORIDE, SODIUM GLUCONATE, SODIUM ACETATE, POTASSIUM CHLORIDE AND MAGNESIUM CHLORIDE 1000 ML: 526; 502; 368; 37; 30 INJECTION, SOLUTION INTRAVENOUS at 06:29

## 2023-02-16 RX ADMIN — ONDANSETRON 4 MG: 2 INJECTION INTRAMUSCULAR; INTRAVENOUS at 07:16

## 2023-02-16 RX ADMIN — LIDOCAINE HYDROCHLORIDE 100 MG: 20 INJECTION, SOLUTION EPIDURAL; INFILTRATION; INTRACAUDAL; PERINEURAL at 07:12

## 2023-02-16 RX ADMIN — PROPOFOL 20 MG: 10 INJECTION, EMULSION INTRAVENOUS at 07:29

## 2023-02-16 RX ADMIN — FENTANYL CITRATE 50 MCG: 50 INJECTION, SOLUTION INTRAMUSCULAR; INTRAVENOUS at 07:16

## 2023-02-16 RX ADMIN — Medication 2 G: at 07:15

## 2023-02-16 RX ADMIN — DEXAMETHASONE SODIUM PHOSPHATE 4 MG: 4 INJECTION, SOLUTION INTRAMUSCULAR; INTRAVENOUS at 07:16

## 2023-02-16 RX ADMIN — PROPOFOL 120 MG: 10 INJECTION, EMULSION INTRAVENOUS at 07:12

## 2023-02-16 RX ADMIN — KETOROLAC TROMETHAMINE 15 MG: 15 INJECTION, SOLUTION INTRAMUSCULAR; INTRAVENOUS at 07:45

## 2023-02-16 NOTE — ANESTHESIA PROCEDURE NOTES
Airway  Urgency: elective    Date/Time: 2/16/2023 7:13 AM  Airway not difficult    General Information and Staff    Patient location during procedure: OR  CRNA/CAA: Yuliya Cortez CRNA  SRNA: Sherwin Bello SRNA  Indications and Patient Condition  Indications for airway management: airway protection    Preoxygenated: yes  Mask difficulty assessment: 0 - not attempted    Final Airway Details  Final airway type: supraglottic airway      Successful airway: I-gel  Size 4     Number of attempts at approach: 1  Assessment: lips, teeth, and gum same as pre-op

## 2023-02-16 NOTE — TELEPHONE ENCOUNTER
Patient called back ( was also present on call) and I disclosed her negative BRCAPlus genetic results. Informed patient these results would be on [facility] and sent to Dr. Charles. Remaining results are expected in the next week or two and we will call patient at that time.

## 2023-02-16 NOTE — INTERVAL H&P NOTE
H&P reviewed. The patient was examined and there are no changes to the H&P.  Patient underwent ultrasound mammotome biopsy of the concerning lesion in the right breast and this was invasive cancer.  Patient is seen medical oncology and lesion was felt to be estrogen negative and very low progesterone positive.  There is discussion about treating this is triple negative.  Clearly patient is elected to undergo neoadjuvant chemotherapy and wished to have Mediport.  Other thing pending on this patient is genetic status which is pending at this time.  I have gone over Mediport placement with the patient she clearly understands procedure alternatives risk and benefits and wishes to proceed.  No changes in the history and physical above otherwise than what is noted here.  She has a small incision in the right breast from the biopsy site and exams unchanged otherwise.    Temp:  [97.6 °F (36.4 °C)] 97.6 °F (36.4 °C)  Heart Rate:  [94] 94  Resp:  [20] 20  BP: (154)/(89) 154/89

## 2023-02-16 NOTE — INTERVAL H&P NOTE
H&P reviewed. The patient was examined and there are no changes to the H&P.      Temp:  [97.6 °F (36.4 °C)] 97.6 °F (36.4 °C)  Heart Rate:  [94] 94  Resp:  [20] 20  BP: (154)/(89) 154/89

## 2023-02-16 NOTE — ANESTHESIA PREPROCEDURE EVALUATION
Anesthesia Evaluation     Patient summary reviewed and Nursing notes reviewed   history of anesthetic complications: PONV  NPO Solid Status: > 8 hours  NPO Liquid Status: > 2 hours           Airway   Mallampati: I  TM distance: >3 FB  Neck ROM: full  possible difficult intubation  Dental    (+) poor dentation    Pulmonary - negative pulmonary ROS    breath sounds clear to auscultation  (-) shortness of breath, not a smoker  Cardiovascular - negative cardio ROS    ECG reviewed  Rhythm: regular  Rate: normal    (-) hypertension, past MI, angina, ROGERS, murmur, cardiac stents    ROS comment: Sinus rhythm with short IL  Cannot rule out Anterior infarct , age undetermined  Abnormal ECG  No previous ECGs available     Referred By: ELIANA ALDRIDGE           Confirmed By: MELVIN LEVIN    Neuro/Psych  (+) psychiatric history Anxiety and ADHD,    GI/Hepatic/Renal/Endo - negative ROS     Musculoskeletal (-) negative ROS    Abdominal    Substance History - negative use     OB/GYN negative ob/gyn ROS   (-)  Pregnant        Other      history of cancer (Breast) active                    Anesthesia Plan    ASA 3     general     intravenous induction     Anesthetic plan, risks, benefits, and alternatives have been provided, discussed and informed consent has been obtained with: patient.  Pre-procedure education provided  Plan discussed with CRNA.        CODE STATUS:

## 2023-02-16 NOTE — OP NOTE
INSERTION VENOUS ACCESS DEVICE  Procedure Note    Christie Griffith  2/16/2023    Pre-op Diagnosis:   Malignant neoplasm of upper-outer quadrant of right female breast, unspecified estrogen receptor status (HCC) [C50.411]    Post-op Diagnosis:     Post-Op Diagnosis Codes:     * Malignant neoplasm of upper-outer quadrant of right female breast, unspecified estrogen receptor status (HCC) [C50.411]    Procedure/CPT® Codes:      Procedure(s):  MEDIPORT PLACEMENT              (C-ARM#2)    Surgeon(s):  Bruce Charles MD    Anesthesia: General    Staff:   Circulator: José Miguel Sweet RN  Radiology Technologist: Xiomara Freire  Scrub Person: Latha Abreu RN  Assistant: Arlette Bobo CSA    Assistant: Arlette Bobo CSA was responsible for performing the following activities: Retraction, Suction, Irrigation, Suturing, Closing and Placing Dressing and their skilled assistance was necessary for the success of this case.     Estimated Blood Loss: minimal    Specimens:                None      Drains: * No LDAs found *    Indications: 37-year-old female recently diagnosed with invasive breast cancer in the right breast.  Presents now for Mediport placement to assist with neoadjuvant chemotherapy    Findings: Good position under fluoroscopy.  Aspirates and flushes nicely at the completion.  Line in good position on chest x-ray in recovery room and lung unremarkable    Complications: None    Procedure: Patient was brought to the operating room where she was placed under general endotracheal anesthesia without any difficulty.  She had SCD's in place.  She received Ancef IV antibiotics.  Her left neck and chest were prepped and draped in normal sterile fashion.  Appropriate timeout was taken.  Everyone was in agreement.  Patient was placed in Trendelenburg position, and we evaluated her left neck with the ultrasound.  She had a fairly small IJ, but we clearly identified it.  It appeared to be patent.  We accessed it  with a needle with the first attempt without any difficulty.  Used an ultrasound for guidance.  We placed a guidewire into position.  Under fluoroscopy, we then looked at the guidewire.  Guidewire was actually going down her left arm, so we pulled the guidewire back and redirected it, and it went over to the right, down into the vena cava.  At that point, we mapped out our subcutaneous tunnel and pocket.  We infiltrated that with local.  We then brought the catheter from the pocket to the access site.  We flushed the catheter with contrast solution.  Then again with the patient in Trendelenburg position we placed the dilator over the guidewire and then removed the guidewire, and then placed the dilator with peel-away sheath over the guidewire.  At this time, we removed the dilator and the guidewire, leaving the peel-away sheath in position.  Catheter was fed through the peel-away sheath and then the peel-away sheath was removed, leaving the catheter in good position.  Then under fluoroscopy, we pulled the catheter back to where it was clearly in the vena cava.  We confirmed that by injecting  contrast.  We then flushed the catheter with heparinized saline.  We then cut the catheter and connected the port and then the locking clip was snapped into position.  We then placed it into the subcutaneous pocket in the left upper chest.  We then aspirated and flushed heparinized saline through this easily.  We checked under fluoroscopy.  The catheter laid nicely.  It appeared to be in good position.  We then placed a 3-0 Prolene suture on each side to keep the port from flipping.  The pocket was then closed with a 3-0 Vicryl and then a running 4-0 Monocryl subcuticular stitch.  Access site was closed with a 4-0 Monocryl subcuticular stitch.  Glue was used for final skin closure at both sites.  Patient was awakened, extubated, and transferred to the recovery room in awake and stable condition.              Disposition:  Transfer to recovery in stable condition        Bruce Charles MD     Date: 2/16/2023  Time: 08:03 CST

## 2023-02-16 NOTE — PROGRESS NOTES
Patient was notified by telephone that genetic testing was negative for pathogenic germline mutations in BRCA1/2 and 6 additional high and moderate risk breast cancer genes.  The lower risk genes, non-breast cancer genes, and RNA analysis is still pending.  Patient will contacted by telephone when full panel is back.

## 2023-02-16 NOTE — ANESTHESIA POSTPROCEDURE EVALUATION
Patient: Christie Griffith    Procedure Summary     Date: 02/16/23 Room / Location: [facility] OR 08 / [facility] OR    Anesthesia Start: 0705 Anesthesia Stop: 0759    Procedure: MEDIPORT PLACEMENT              (C-ARM#2) (Left: Chest) Diagnosis:       Malignant neoplasm of upper-outer quadrant of right female breast, unspecified estrogen receptor status (HCC)      (Malignant neoplasm of upper-outer quadrant of right female breast, unspecified estrogen receptor status (HCC) [C50.411])    Surgeons: Bruce Charles MD Provider: Yuliya Cortez CRNA    Anesthesia Type: general ASA Status: 3          Anesthesia Type: general    Vitals  Vitals Value Taken Time   /83 02/16/23 0759   Temp 97.3 °F (36.3 °C) 02/16/23 0759   Pulse 84 02/16/23 0759   Resp 18 02/16/23 0759   SpO2 98 % 02/16/23 0759           Post Anesthesia Care and Evaluation    Patient location during evaluation: PACU  Patient participation: complete - patient participated  Level of consciousness: awake and alert  Pain management: adequate    Airway patency: patent  Anesthetic complications: No anesthetic complications    Cardiovascular status: acceptable  Respiratory status: acceptable  Hydration status: acceptable    Comments: ---------------------------               02/16/23 0759         ---------------------------   BP:          128/83         Pulse:         84           Resp:          18           Temp:   97.3 °F (36.3 °C)   SpO2:          98%         ---------------------------

## 2023-02-17 ENCOUNTER — DOCUMENTATION (OUTPATIENT)
Dept: ONCOLOGY | Facility: CLINIC | Age: 38
End: 2023-02-17
Payer: COMMERCIAL

## 2023-02-17 NOTE — PROGRESS NOTES
Distress Screening Follow-up  Oncology SW encounter:  2-13-23.    Diagnosis: Christie Griffith presents as a 37 year old female from [place], [place] newly diagnosed with  Breast cancer, right breast. Biopsy on 2-1-23 indicating poorly differentiated invasive ductal carcinoma.    Pt. Is accompanied at the time of SW encounter by her  who presents attentive, supportive and offers collaborative feedback.      Location of Distress Screening: Medical oncology out patient     Distress Level: 5 (2/13/2023 11:00 AM)    Physical Concerns:Distress related to sleep and physical concerns discussed with physician.     Sleep: Y    Practical Problems:Pt. Stated no barriers to proceeding with care recommendations.  Pt. Is employed as  teacher at [employer]. Spouse is employed. Couple have one dependent child.    Pt. Shared interest in knowing more about dietary recommendations as she begins treatment.  Pt. States interest in peer/social support networks.           Emotional Concerns: Pt. Presents alert and oriented x 3. Mood and affect within normal limits. Pt engages openly, smiling and articulates her emotions well.  She describes self as positive and organized.  Pt. Shares having good support system with family, work and [employer] family.  She evidences adaptive coping and shares feelings that are consistent with her adjustments with new cancer diagnosis, treatment planning and recommendations.                Spiritual Concerns: Jehovah's witness judy. States she attends [employer].          Interventions: LCSW offered person centered therapeutic feedback by means of collecting history, emotional support, active listening, validation of emotions and clarification of feedback and recommendations for care.  Provided education related to oncology supports and services.    Discussed current barriers with peer support group/ online support offered.     Emotional Needs: emotional suppport/coping  strategies  Physical Needs: dietitian (referral to Felicitas Merino in Rhonda Madrid's absence. request to call pt to discuss treatment diet needs.)    Comments:Patient voiced no immediate concerns to SW.  No referrals for acute intervention identified or required from this encounter.  LCSW will follow up per advisement and/or as needs arise as pt proceeds with treatment.

## 2023-02-20 ENCOUNTER — HOSPITAL ENCOUNTER (OUTPATIENT)
Dept: PET IMAGING | Facility: HOSPITAL | Age: 38
Discharge: HOME | End: 2023-02-20
Admitting: INTERNAL MEDICINE
Payer: COMMERCIAL

## 2023-02-20 ENCOUNTER — TELEPHONE (OUTPATIENT)
Dept: GENETICS | Facility: HOSPITAL | Age: 38
End: 2023-02-20
Payer: COMMERCIAL

## 2023-02-20 ENCOUNTER — DOCUMENTATION (OUTPATIENT)
Dept: GENETICS | Facility: HOSPITAL | Age: 38
End: 2023-02-20
Payer: COMMERCIAL

## 2023-02-20 DIAGNOSIS — C50.411 MALIGNANT NEOPLASM OF UPPER-OUTER QUADRANT OF RIGHT FEMALE BREAST, UNSPECIFIED ESTROGEN RECEPTOR STATUS: ICD-10-CM

## 2023-02-20 PROCEDURE — 0 FLUDEOXYGLUCOSE F18 SOLUTION: Performed by: INTERNAL MEDICINE

## 2023-02-20 PROCEDURE — 78815 PET IMAGE W/CT SKULL-THIGH: CPT

## 2023-02-20 PROCEDURE — A9552 F18 FDG: HCPCS | Performed by: INTERNAL MEDICINE

## 2023-02-20 RX ADMIN — SODIUM FLUORIDE F 18 1 DOSE: 200 INJECTION, SOLUTION INTRAVENOUS at 10:33

## 2023-02-20 NOTE — PROGRESS NOTES
Christie Griffith, a 37-year-old female, was seen for genetic counseling due to a personal history of breast cancer. Genetic counseling was provided via telemedicine at [facility]. Ms. Griffith was recently diagnosed with an ER negative right breast cancer at age 37. She is in the process of making a surgical decision. Ms. Griffith retains her uterus and ovaries. She has never had a colonoscopy. She was interested in discussing her risk for a hereditary cancer syndrome.  Ms. Griffith was interested in pursuing a multi-gene panel to evaluate her risk, therefore the CancerNext panel was ordered through [employer] which analyzes BRCA1/2 and 34 additional genes associated with an increased cancer risk. The genes on this panel include APC, TOMMY, AXIN2, BARD1, BMPR1A, BRCA1, BRCA2, BRIP1, CDH1, CDK4, CDKN2A, CHEK2, DICER1, EPCAM, GREM1, HOXB13, MLH1, MSH2, MSH3, MSH6, MUTYH, NBN, NF1, NTHL1, PALB2, PMS2, POLD1, POLE, PTEN, RAD51C, RAD51D, RECQL, SMAD4, SMARCA4, STK11, and TP53. Genetic testing was negative for pathogenic mutations in BRCA1/2 and 34 additional genes on the CancerNext panel.  These normal results were discussed with Ms. Griffith by telephone on 2/20/2023.    PERTINENT FAMILY HISTORY: (See attached pedigree)   Mother:   Hodgkin Lymphoma, 39  Mat. Great Aunt (x3):  Breast cancer  Mat. Great Grandmother; Uterine cancer  Pat. Uncle:   Prostate cancer  Pat. Great Aunt:  Breast cancer  Pat. Grandfather:  Lung cancer, 52  Pat. Great Aunt:  Breast cancer    Records regarding the family history were not provided for review.     RISK ASSESSMENT:  Ms. Griffith’s personal and family history of breast cancer led to concern for a hereditary cancer syndrome. We discussed BRCA1/2 testing as well as the option of pursuing a panel that would test for other genes known to impact cancer risk in addition to BRCA1/2.   Ms. Griffith clearly meets NCCN guidelines criteria for BRCA1/2 testing based on her  personal history of breast cancer diagnosed before age 50.  These risk assessments are based on the family history information provided at the time of the appointment, and could change in the future should new information be obtained.    GENETIC COUNSELING: We reviewed the family history information in detail. Cases of cancer follow three general patterns: sporadic, familial, and hereditary.  While most cancer is sporadic, some cases appear to occur in family clusters.  These cases are said to be familial and account for 10-20% of cancer cases.  Familial cases may be due to a combination of shared genes and environmental factors among family members.  In even fewer families (5-10%), the cancer is said to be inherited, and the genes responsible for the cancer are known.      Family histories typical of hereditary cancer syndromes usually include multiple first- and second-degree relatives diagnosed with cancer types that define a syndrome.  These cases tend to be diagnosed at younger-than-expected ages and can be bilateral or multifocal.  The cancer in these families follows an autosomal dominant inheritance pattern, which indicates the likely presence of a mutation in a cancer susceptibility gene.  Children and siblings of an individual believed to carry this mutation have a 50% chance of inheriting that mutation, thereby inheriting the increased risk to develop cancer.  These mutations can be passed down from the maternal or the paternal lineage.    Hereditary breast cancer accounts for 5-10% of all cases of breast cancer.  A significant proportion of hereditary breast and ovarian cancer can be attributed to mutations in the BRCA1 and BRCA2 genes.  Mutations in these genes confer an increased risk for breast cancer, ovarian cancer, male breast cancer, prostate cancer, and pancreatic cancer. Women with a BRCA1 or BRCA2 mutation who have already been diagnosed with breast cancer have a 40-60% lifetime risk of a  second breast cancer. Women with a BRCA1 or BRCA2 mutation have up to a 44% risk of ovarian cancer.      There are other genes that are known to be associated with an increased risk for cancer.  Some of these genes have well defined cancer risks and established management guidelines.  Other genes that can be tested for have been more recently described, and there may be less data regarding the risks and therefore may not have established management guidelines. We discussed these limitations at length.  Based on Ms. Griffith’s desire to get as much information as possible regarding her personal risks and potential risks for her family, she opted to pursue testing through a panel that would look at several other genes known to increase the risk for cancer.    GENETIC TESTING:  The risks, benefits and limitations of genetic testing and implications for clinical management following testing were reviewed.  DNA test results can influence decisions regarding screening, prevention and surgical management.  Genetic testing can have significant psychological implications for both individuals and families.  Also discussed was the possibility of employment and insurance discrimination based on genetic test results and the laws in place to prevent this ([place]).    We discussed panel testing, which would involve testing for BRCA1/2 as well as 34 additional genes that are associated with increased cancer risk. The benefits and limitations of genetic testing were discussed and Ms. Griffith decided to pursue testing via the panel. The implications of a positive or negative test result were discussed. We discussed the possibility that, in some cases, genetic test results may be informative or may be ambiguous due to the identification of a genetic variant. These variants may or may not be associated with an increased cancer risk.  With multigene panel testing, it is not uncommon for a variant of uncertain significance (VUS) to be  identified.  If a VUS is identified, testing family members is typically not recommended and screening recommendations are made based on the family history.  The laboratories that perform genetic testing work to reclassify the VUS and send out an amended report if and when a VUS is reclassified.  The majority of variant findings are ultimately reclassified to a negative result.  Given her personal and family history, a negative test result would not eliminate all cancer risk to her relatives, although the risk would not be as high as it would with positive genetic testing.      TEST RESULTS:  Genetic testing was negative for known pathogenic mutations by sequencing, rearrangement testing, and RNA analysis for the genes on the CancerNext panel (see attached results). This negative result greatly lowers but does not eliminate the risk of a hereditary cancer syndrome for Ms. Griffith. This assessment is based on the information provided at time of consultation.    CANCER SCREENING: Ms. Griffith’s surveillance and management should be determined by her oncology team. Despite the negative genetic test results, Ms. Griffith’s female relatives may have a somewhat increased lifetime risk for breast cancer based on family history. Female relatives could have a risk assessment performed using a family history-based model, such as the Tyrer-Cuzick model, to determine their individual risks.  Surveillance for individuals with a high lifetime risk of breast cancer (>20%, versus the average risk of 12%), based on NCCN guidelines, would consist of semi-annual clinical breast exams and monthly self-breast exams starting by age 18 and annual mammography starting 10 years younger than the earliest diagnosis in a close relative, or starting by age 40, whichever is earliest.  According to an American Cancer Society expert panel, annual breast MRI should be offered to women whose lifetime risk of breast cancer is 20-25 percent or more,  also starting by age 40 or earlier if indicated by family history.      PLAN: Genetic counseling remains available to Ms. Griffith. She is welcome to contact us with any questions or concerns at [phone].      Yancy Galicia MS, [facility] [place], [facility]  Licensed Certified Genetic Counselor       Cc: MD Ryan Ojeda MD

## 2023-02-21 ENCOUNTER — DOCUMENTATION (OUTPATIENT)
Dept: NUTRITION | Facility: HOSPITAL | Age: 38
End: 2023-02-21
Payer: COMMERCIAL

## 2023-02-21 ENCOUNTER — TELEPHONE (OUTPATIENT)
Dept: ONCOLOGY | Facility: HOSPITAL | Age: 38
End: 2023-02-21
Payer: COMMERCIAL

## 2023-02-21 NOTE — PROGRESS NOTES
Nutrition Services    Patient Name:  Christie Griffith  YOB: 1985  MRN: [id number]  Admit Date:  (Not on file)    This patient was referred to this RDN by the  at the [facility] to  call patient and  discuss healthy eating before cancer treatment for breast cancer.    Spoke with pt by phone. She was appreciative for the call. She requested  nutrition info about eating healthy.She said she was trying to lose weight in the fall and lost a few pounds, but then overall she had lost 25 lb. She is not interested in losing anymore.     She reports due to the stress of the new diagnosis she is unable to eat as much as she did at a time.     This RDN encouraged her to eat small, frequent meals to promote weight maintenance during this time and nutrition handouts were emailed to the patient for home use. This RDN offered to speak with the pt in person or call to discuss nutrition further and asked pt to make me aware of her wishes re this. RDN will follow as needed.     Electronically signed by:  Felicitas Merino RD  02/21/23 09:46 CST

## 2023-02-21 NOTE — TELEPHONE ENCOUNTER
Contacted pt regarding echo per Dr Nicole. PT verbalizes understanding and denies any further questions.

## 2023-02-22 ENCOUNTER — OFFICE VISIT (OUTPATIENT)
Dept: ONCOLOGY | Facility: CLINIC | Age: 38
End: 2023-02-22
Payer: COMMERCIAL

## 2023-02-22 VITALS
RESPIRATION RATE: 18 BRPM | BODY MASS INDEX: 25.84 KG/M2 | OXYGEN SATURATION: 99 % | WEIGHT: 165 LBS | DIASTOLIC BLOOD PRESSURE: 104 MMHG | HEART RATE: 70 BPM | SYSTOLIC BLOOD PRESSURE: 160 MMHG

## 2023-02-22 DIAGNOSIS — Z17.0 MALIGNANT NEOPLASM OF UPPER-OUTER QUADRANT OF RIGHT BREAST IN FEMALE, ESTROGEN RECEPTOR POSITIVE: Primary | ICD-10-CM

## 2023-02-22 DIAGNOSIS — C50.411 MALIGNANT NEOPLASM OF UPPER-OUTER QUADRANT OF RIGHT BREAST IN FEMALE, ESTROGEN RECEPTOR POSITIVE: Primary | ICD-10-CM

## 2023-02-22 PROCEDURE — G0463 HOSPITAL OUTPT CLINIC VISIT: HCPCS | Performed by: INTERNAL MEDICINE

## 2023-02-22 PROCEDURE — 99214 OFFICE O/P EST MOD 30 MIN: CPT | Performed by: INTERNAL MEDICINE

## 2023-02-22 RX ORDER — ONDANSETRON HYDROCHLORIDE 8 MG/1
8 TABLET, FILM COATED ORAL 3 TIMES DAILY PRN
Qty: 30 TABLET | Refills: 5 | Status: SHIPPED | OUTPATIENT
Start: 2023-02-22

## 2023-02-22 RX ORDER — OLANZAPINE 5 MG/1
5 TABLET ORAL NIGHTLY
Qty: 3 TABLET | Refills: 3 | Status: SHIPPED | OUTPATIENT
Start: 2023-02-22

## 2023-02-22 NOTE — PROGRESS NOTES
Subjective     Christie Griffith was seen in follow-up for breast cancer.  We reviewed the result of PET scan.  We discussed her treatment options at length.  Rationale for neoadjuvant versus adjuvant therapy discussed at length.  Side effects associated with chemotherapy discussed as well.    Past Medical History, Past Surgical History, Social History, Family History have been reviewed and are without significant changes except as mentioned.        Medications:  The current medication list was reviewed in the EMR    ALLERGIES:    Allergies   Allergen Reactions   • Transderm-Scop [Scopolamine] Dizziness       Objective      Vitals:    02/22/23 1600   BP: (!) 160/104   Pulse: 70   Resp: 18   SpO2: 99%         Physical Exam  Vitals and nursing note reviewed.   Constitutional:       Appearance: Normal appearance.   Neurological:      General: No focal deficit present.      Mental Status: She is alert and oriented to person, place, and time. Mental status is at baseline.   Psychiatric:         Mood and Affect: Mood normal.         Behavior: Behavior normal.         Thought Content: Thought content normal.               RECENT LABS:Independently reviewed and summarized  Hematology WBC   Date Value Ref Range Status   02/13/2023 10.07 3.40 - 10.80 10*3/mm3 Final     RBC   Date Value Ref Range Status   02/13/2023 4.92 3.77 - 5.28 10*6/mm3 Final     Hemoglobin   Date Value Ref Range Status   02/13/2023 15.1 12.0 - 15.9 g/dL Final     Hematocrit   Date Value Ref Range Status   02/13/2023 42.1 34.0 - 46.6 % Final     Platelets   Date Value Ref Range Status   02/13/2023 456 (H) 140 - 450 10*3/mm3 Final       Lab Results   Component Value Date    GLUCOSE 97 02/13/2023    BUN 12 02/13/2023    CREATININE 0.69 02/13/2023    EGFR 114.8 02/13/2023    BCR 17.4 02/13/2023    K 3.7 02/13/2023    CO2 27.0 02/13/2023    CALCIUM 9.9 02/13/2023    ALBUMIN 5.1 02/13/2023    AST 14 02/13/2023    ALT 15 02/13/2023            Imaging  (independently reviewed and summarized): ,    NM PET/CT Skull Base to Mid Thigh    Result Date: 2/21/2023  1.  Intense uptake within a right-sided breast mass. 2. Diffuse skeletal uptake without evidence of any abnormalities on the CT portion of examination. This is more likely physiological than metastases. Nuclear medicine PET/CT is otherwise unremarkable. Electronically signed by:  Layton Novoa MD  2/21/2023 3:54 PM CST Workstation: [id number]    US Breast Right Limited    Result Date: 1/27/2023  Right breast 9:00 position 7 cm from nipple palpable solid nodule described above which would be suspicious for possible malignancy. Recommend biopsy to further evaluate. BI-RADS category 4: Suspicious abnormality Recommendation: Ultrasound  biopsy. Electronically signed by:  Pola Wharton MD  1/27/2023 2:27 PM CST Workstation: [id number]    Mammo diagnostic digital tomosynthesis bilateral w CAD    Result Date: 1/27/2023  Right breast 9:00 position 7 cm from nipple palpable solid nodule described above which would be suspicious for possible malignancy. Recommend biopsy to further evaluate. BI-RADS category 4: Suspicious abnormality Recommendation: Ultrasound  biopsy. Electronically signed by:  Pola Wharton MD  1/27/2023 2:27 PM CST Workstation: [id number]     XR Chest Post CVA Port    Result Date: 2/16/2023  Left jugular port now in place tip just above cavoatrial junction. No pneumothorax or acute disease. [id number] Electronically signed by:  Jaime Quintana MD  2/16/2023 8:25 AM CST Workstation: [id number]      Pathology (result reviewed):   2/1/23     DIAGNOSIS:   BREAST, BIOPSY, NEEDLE CORES, RIGHT:   Invasive ductal carcinoma, poorly differentiated (3+3+2)   Negative for estrogen receptors   Positive for progesterone receptors (1%)   Negative for HER2 overexpression (1+)       Christie Griffith reports a pain score of 1.  Given her pain assessment as noted, treatment options were discussed and the following options were  decided upon as a follow-up plan to address the patient's pain: continuation of current treatment plan for pain.    Patient screened negative for depression based on a PHQ-9 score of 0 on 2/22/2023.     Diagnosis:   (1) Right breast cancer   Stage IIB (cT2, cN0, cM0, G3)         Assessment & Plan     Chronic, stable.    Result of PET scan reviewed which showed no evidence of metastatic disease.    Her case is currently being reviewed at [facility] with regard to hormone status.  It appears that she might have estrogen receptor positivity although weak.  Tumor remains poorly differentiated with very low if any progesterone receptor positivity.  Final report is currently pending.    I had a very lengthy discussion with patient and family about diagnosis, prognosis and treatment options.  Rationale for neoadjuvant versus adjuvant therapy discussed at length.  Risk versus benefit of each approach discussed at length as well.    For neoadjuvant chemotherapy I recommend dose dense AC followed by dose dense Taxol.  Her echocardiogram showed normal LV ejection fraction.  Side effects associated with chemotherapy regimen discussed at length.  Risk of fatigue, nausea, emesis, constipation, diarrhea, cytopenias, increased risk of infection, risk of bleeding.  Hair loss, nail changes, potential need for blood transfusion, rare risk of cardiotoxicity nephrotoxicity//hepatotoxicity, rare risk of secondary malignancy discussed at length.  After lengthy discussion patient and family understand very agreeable to start chemotherapy.  I will plan to get her started next week with dose dense AC.      Recommendations:   · Discussed diagnosis, prognosis and treatment options at length.  · Recommend neoadjuvant chemotherapy.  · Recommend dose dense AC with growth factor support followed by dose dense Taxol.  · Chemotherapy side effects discussed at length.  · CBC, CMP next week prior to cycle 1.      2/22/2023      CC:

## 2023-02-23 ENCOUNTER — TELEPHONE (OUTPATIENT)
Dept: OBSTETRICS AND GYNECOLOGY | Facility: CLINIC | Age: 38
End: 2023-02-23
Payer: COMMERCIAL

## 2023-02-23 LAB — REF LAB TEST METHOD: NORMAL

## 2023-02-23 NOTE — TELEPHONE ENCOUNTER
----- Message from Yancy Quan MD sent at 2/23/2023  5:27 AM CST -----  Please call and let her know that Dr. Nicole wants her IUD removed because of the progesterone. Please set her up for an appt ASAP.    ----- Message -----  From: Ryan Meeks MD  Sent: 2/22/2023   4:39 PM CST  To: MD Dr Kadeem Ocasio     I am seeing Ms Griffith for new hormone positive breast cancer.   She has Mirena IUD.   Would it be possible to get this remove as soon as we can?     Thank you

## 2023-02-24 ENCOUNTER — TELEPHONE (OUTPATIENT)
Dept: ONCOLOGY | Facility: CLINIC | Age: 38
End: 2023-02-24
Payer: COMMERCIAL

## 2023-02-24 DIAGNOSIS — F90.2 ATTENTION DEFICIT HYPERACTIVITY DISORDER, COMBINED TYPE: ICD-10-CM

## 2023-02-27 ENCOUNTER — DOCUMENTATION (OUTPATIENT)
Dept: ONCOLOGY | Facility: HOSPITAL | Age: 38
End: 2023-02-27
Payer: COMMERCIAL

## 2023-02-27 RX ORDER — DEXTROAMPHETAMINE SACCHARATE, AMPHETAMINE ASPARTATE MONOHYDRATE, DEXTROAMPHETAMINE SULFATE AND AMPHETAMINE SULFATE 5; 5; 5; 5 MG/1; MG/1; MG/1; MG/1
20 CAPSULE, EXTENDED RELEASE ORAL EVERY MORNING
Qty: 30 CAPSULE | Refills: 0 | Status: SHIPPED | OUTPATIENT
Start: 2023-02-27 | End: 2023-04-03 | Stop reason: SDUPTHER

## 2023-02-28 ENCOUNTER — PROCEDURE VISIT (OUTPATIENT)
Dept: OBSTETRICS AND GYNECOLOGY | Facility: CLINIC | Age: 38
End: 2023-02-28
Payer: COMMERCIAL

## 2023-02-28 ENCOUNTER — OFFICE VISIT (OUTPATIENT)
Dept: ONCOLOGY | Facility: CLINIC | Age: 38
End: 2023-02-28
Payer: COMMERCIAL

## 2023-02-28 VITALS
HEART RATE: 99 BPM | RESPIRATION RATE: 18 BRPM | SYSTOLIC BLOOD PRESSURE: 140 MMHG | DIASTOLIC BLOOD PRESSURE: 87 MMHG | OXYGEN SATURATION: 98 %

## 2023-02-28 VITALS
DIASTOLIC BLOOD PRESSURE: 90 MMHG | WEIGHT: 165 LBS | HEIGHT: 67 IN | BODY MASS INDEX: 25.9 KG/M2 | SYSTOLIC BLOOD PRESSURE: 132 MMHG

## 2023-02-28 DIAGNOSIS — Z30.432 ENCOUNTER FOR REMOVAL OF INTRAUTERINE CONTRACEPTIVE DEVICE: Primary | ICD-10-CM

## 2023-02-28 DIAGNOSIS — Z17.0 MALIGNANT NEOPLASM OF UPPER-OUTER QUADRANT OF RIGHT BREAST IN FEMALE, ESTROGEN RECEPTOR POSITIVE: ICD-10-CM

## 2023-02-28 DIAGNOSIS — C50.411 MALIGNANT NEOPLASM OF UPPER-OUTER QUADRANT OF RIGHT BREAST IN FEMALE, ESTROGEN RECEPTOR POSITIVE: ICD-10-CM

## 2023-02-28 PROCEDURE — 99214 OFFICE O/P EST MOD 30 MIN: CPT | Performed by: NURSE PRACTITIONER

## 2023-02-28 PROCEDURE — 58301 REMOVE INTRAUTERINE DEVICE: CPT | Performed by: NURSE PRACTITIONER

## 2023-02-28 PROCEDURE — G0463 HOSPITAL OUTPT CLINIC VISIT: HCPCS | Performed by: NURSE PRACTITIONER

## 2023-02-28 NOTE — PROGRESS NOTES
IUD Removal    Date of procedure:  2/28/2023    Risks and benefits discussed? yes  All questions answered? yes  Consents given by The patient  Written consent obtained? yes  Reason for removal: [place] positive breast cancer dx    Local anesthesia used:  no    Procedure documentation:    A speculum was placed in order to view the cervix.  The cervix did not need to be grasped.  Cervical dilation did not need to be performed in order to access the string.  The IUD string was easily seen.  The string was grasped and the IUD was removed without difficulty.  The IUD did not appear to be adherent to the uterine cavity. It was removed intact.    She tolerated the procedure without any difficulty.     Post procedure instructions: Patient notified to call with heavy bleeding, fever or increasing pain.    Contraception desired: plans to use condoms    Follow up needed: For annual or sooner PRN problems    Diagnoses and all orders for this visit:    1. Encounter for removal of intrauterine contraceptive device (Primary)        This note was electronically signed.    Ira Jerome, MELONY  February 28, 2023

## 2023-03-01 DIAGNOSIS — C50.411 MALIGNANT NEOPLASM OF UPPER-OUTER QUADRANT OF RIGHT BREAST IN FEMALE, ESTROGEN RECEPTOR POSITIVE: Primary | ICD-10-CM

## 2023-03-01 DIAGNOSIS — Z17.0 MALIGNANT NEOPLASM OF UPPER-OUTER QUADRANT OF RIGHT BREAST IN FEMALE, ESTROGEN RECEPTOR POSITIVE: Primary | ICD-10-CM

## 2023-03-01 NOTE — PROGRESS NOTES
CHEMOTHERAPY PREPARATION    Christie Griffith  [id number]  1985    Chief Complaint: chemo education     History of present illness:  Christie Griffith is a 37 y.o. year old female who is here today for chemotherapy preparation and needs assessment. The patient has been diagnosed with breast cancer and is scheduled to begin treatment with Adriamycin and Cytoxan.     Oncology History:    Oncology/Hematology History   Malignant neoplasm of upper-outer quadrant of right female breast (HCC)   2/3/2023 Initial Diagnosis    Malignant neoplasm of upper-outer quadrant of right female breast (HCC)     2/22/2023 Cancer Staged    Staging form: Breast, AJCC 8th Edition  - Clinical stage from 2/22/2023: Stage IIB (cT2, cN0, cM0, G3, ER+, NV-, HER2-) - Signed by Ryan Meeks MD on 2/22/2023     3/1/2023 -  Chemotherapy    OP BREAST AC DD DOXOrubicin / Cyclophosphamide         Past Medical History:   Diagnosis Date   • ADHD (attention deficit hyperactivity disorder) 7-22   • Generalized anxiety disorder    • Malignant neoplasm of upper-outer quadrant of right female breast (HCC) 2/3/2023   • PONV (postoperative nausea and vomiting)    • Psoriasis    • Varicella 1990       Past Surgical History:   Procedure Laterality Date   • BILATERAL BREAST REDUCTION  01/2009   • MOUTH SURGERY      x 4 dental implants   • REDUCTION MAMMAPLASTY     • WISDOM TOOTH EXTRACTION         MEDICATIONS: The current medication list was reviewed and reconciled.     Allergies:  is allergic to transderm-scop [scopolamine].    Family History   Problem Relation Age of Onset   • Lymphoma Mother    • Diabetes Mother    • Heart disease Mother    • Coronary artery disease Father    • Atrial fibrillation Father    • Heart disease Father    • Thyroid disease Other    • Heart murmur Daughter          Review of Systems    Physical Exam  Vital Signs: /87   Pulse 99   Resp 18   SpO2 98%    General Appearance:  alert, cooperative, no apparent distress,  appears stated age and normal weight   Neurologic/Psychiatric: A&O x 3, gait steady, appropriate affect   HEENT:  Normocephalic, without obvious abnormality, mucous membranes moist   Lungs:   Clear to auscultation bilaterally; respirations regular, even, and unlabored bilaterally   Heart:  Regular rate and rhythm, no murmurs appreciated   Extremities: Normal, atraumatic; no clubbing, cyanosis, or edema    Skin: No rashes, lesions, or abnormal coloration noted     ECOG Performance Status: (0) Fully Active - Able to Carry On All Pre-disease Performance Without Restriction          NEEDS ASSESSMENTS    Genetics  The patient's new diagnosis and family history have been reviewed for genetic counseling needs. Patient has already met with genetic counselor and has had genetic testing.    Psychosocial  The patient has completed a PHQ-2 Depression Screening  today.   PHQ-2 results show 0 (No Depression).   Copies of patient's questionnaires will be scanned into EMR for details and further reference.    Barriers to care  A barriers form was also completed by the patient today.  Based upon barriers assessment today, the patient will not require a follow-up call from the  to further discuss needs.   A copy of the barriers form will also be scanned into EMR for details and further reference.     VAD Assessment  The patient has medi port in place.     Advanced Care Planning  The patient does not have an up-to-date advanced directive. This document is not on file with our office. The patient is not interested in an appointment with one of our facilitators to create or update their advanced directives.      Additional Referral needs  none      CHEMOTHERAPY EDUCATION    Booklets Given: Chemo cares education sheet on Adriamycin and Cytoxan  [facility] chemotherapy binder  Consent form signed today.      Chemotherapy/Biotherapy Education Sheets: (list all that apply)  nausea management, acid reflux  management, diarrhea management, Cancer resourse contacts information, skin and mouth care and wig information                                                                                                                                                                 Chemotherapy Regimen:   Treatment Plans     Name Type Plan Dates Plan Provider         Active    OP BREAST AC DD DOXOrubicin / Cyclophosphamide ONCOLOGY TREATMENT  2/22/2023 - Present Ryan Meeks MD                    TOPICS EDUCATION PROVIDED COMMENTS   ANEMIA:  role of RBC, cause, s/s, ways to manage, role of transfusion [x]    THROMBOCYTOPENIA:  role of platelet, cause, s/s, ways to prevent bleeding, things to avoid, when to seek help [x]    NEUTROPENIA:  role of WBC, cause, infection precautions, s/s of infection, when to call MD  Educated on neutropenic fever and to come to ER immediately with any temp 100.4 or higher   NUTRITION & APPETITE CHANGES:  importance of maintaining healthy diet & weight, ways to manage to improve intake, dietary consult, exercise regimen [x]    DIARRHEA:  causes, s/s of dehydration, ways to manage, dietary changes, when to call MD [x]    CONSTIPATION:  causes, ways to manage, dietary changes, when to call MD [x]    NAUSEA & VOMITING:  cause, use of antiemetics, dietary changes, when to call MD [x]    MOUTH SORES:  causes, oral care, ways to manage [x]    ALOPECIA:  cause, ways to manage, resources [x]    INFERTILITY & SEXUALITY:  causes, fertility preservation options, sexuality changes, ways to manage, importance of birth control [x]    NERVOUS SYSTEM CHANGES:  causes, s/s, neuropathies, cognitive changes, ways to manage [x]    PAIN:  causes, ways to manage [x] ????   SKIN & NAIL CHANGES:  cause, s/s, ways to manage [x]    ORGAN TOXICITIES:  cause, s/s, need for diagnostic tests, labs, when to notify MD [x]    SURVIVORSHIP:  distress, distress assessment, secondary malignancies, early/late effects,  follow-up, social issues, social support [x]    HOME CARE:  use of spill kits, storing of PO chemo, how to manage bodily fluids [x]    MISCELLANEOUS:  drug interactions, administration, vesicant, et [x]        Assessment and Plan:    Diagnoses and all orders for this visit:    1. Malignant neoplasm of upper-outer quadrant of right breast in female, estrogen receptor positive (HCC)        This was a 35 minute face-to-face visit with 35 minutes spent in  counseling and coordination of care as documented above.   The patient and I have reviewed their new cancer diagnosis and scheduled treatment plan. Needs assessment was completed including genetics, psychosocial needs, barriers to care, VAD evaluation, advanced care planning, and palliative care services. Referrals have been ordered as appropriate based upon our evaluation and patient desires.     Chemotherapy teaching was also completed today as documented above. Adequate time was given to answer all questions to her satisfaction. Patient and family are aware of their care team members and contact information if they have questions or problems throughout the treatment course. Needs assessments and education has been completed. The patient is adequately prepared to begin treatment as scheduled.       MELONY Goodrich

## 2023-03-01 NOTE — PROGRESS NOTES
Subjective     Christie Griffith was seen in follow up for breast cancer.   We discussed the neoadjuvant chemotherapy regimen and side effects at length.  Risk versus benefit discussed.  She understood and was agreeable    Past Medical History, Past Surgical History, Social History, Family History have been reviewed and are without significant changes except as mentioned.        Medications:  The current medication list was reviewed in the EMR    ALLERGIES:    Allergies   Allergen Reactions   • Transderm-Scop [Scopolamine] Dizziness       Objective      Vitals:    03/02/23 0815   BP: 156/97   Pulse: 110   Resp: 18   Temp: 97.3 °F (36.3 °C)   SpO2: 99%         Physical Exam  Vitals and nursing note reviewed.   Constitutional:       Appearance: Normal appearance.   Neurological:      General: No focal deficit present.      Mental Status: She is alert and oriented to person, place, and time. Mental status is at baseline.   Psychiatric:         Mood and Affect: Mood normal.         Behavior: Behavior normal.         Thought Content: Thought content normal.               RECENT LABS:Independently reviewed and summarized  Hematology WBC   Date Value Ref Range Status   02/13/2023 10.07 3.40 - 10.80 10*3/mm3 Final     RBC   Date Value Ref Range Status   02/13/2023 4.92 3.77 - 5.28 10*6/mm3 Final     Hemoglobin   Date Value Ref Range Status   02/13/2023 15.1 12.0 - 15.9 g/dL Final     Hematocrit   Date Value Ref Range Status   02/13/2023 42.1 34.0 - 46.6 % Final     Platelets   Date Value Ref Range Status   02/13/2023 456 (H) 140 - 450 10*3/mm3 Final     WBC   Date Value Ref Range Status   03/02/2023 6.28 3.40 - 10.80 10*3/mm3 Final     RBC   Date Value Ref Range Status   03/02/2023 4.48 3.77 - 5.28 10*6/mm3 Final     Hemoglobin   Date Value Ref Range Status   03/02/2023 13.5 12.0 - 15.9 g/dL Final     Hematocrit   Date Value Ref Range Status   03/02/2023 39.0 34.0 - 46.6 % Final     MCV   Date Value Ref Range Status    03/02/2023 87.1 79.0 - 97.0 fL Final     MCH   Date Value Ref Range Status   03/02/2023 30.1 26.6 - 33.0 pg Final     MCHC   Date Value Ref Range Status   03/02/2023 34.6 31.5 - 35.7 g/dL Final     RDW   Date Value Ref Range Status   03/02/2023 12.6 12.3 - 15.4 % Final     RDW-SD   Date Value Ref Range Status   03/02/2023 39.8 37.0 - 54.0 fl Final     MPV   Date Value Ref Range Status   03/02/2023 10.9 6.0 - 12.0 fL Final     Platelets   Date Value Ref Range Status   03/02/2023 298 140 - 450 10*3/mm3 Final     Neutrophil %   Date Value Ref Range Status   03/02/2023 64.7 42.7 - 76.0 % Final     Lymphocyte %   Date Value Ref Range Status   03/02/2023 26.1 19.6 - 45.3 % Final     Monocyte %   Date Value Ref Range Status   03/02/2023 7.3 5.0 - 12.0 % Final     Eosinophil %   Date Value Ref Range Status   03/02/2023 0.6 0.3 - 6.2 % Final     Basophil %   Date Value Ref Range Status   03/02/2023 1.0 0.0 - 1.5 % Final     Immature Grans %   Date Value Ref Range Status   03/02/2023 0.3 0.0 - 0.5 % Final     Neutrophils, Absolute   Date Value Ref Range Status   03/02/2023 4.06 1.70 - 7.00 10*3/mm3 Final     Lymphocytes, Absolute   Date Value Ref Range Status   03/02/2023 1.64 0.70 - 3.10 10*3/mm3 Final     Monocytes, Absolute   Date Value Ref Range Status   03/02/2023 0.46 0.10 - 0.90 10*3/mm3 Final     Eosinophils, Absolute   Date Value Ref Range Status   03/02/2023 0.04 0.00 - 0.40 10*3/mm3 Final     Basophils, Absolute   Date Value Ref Range Status   03/02/2023 0.06 0.00 - 0.20 10*3/mm3 Final     Immature Grans, Absolute   Date Value Ref Range Status   03/02/2023 0.02 0.00 - 0.05 10*3/mm3 Final     nRBC   Date Value Ref Range Status   03/02/2023 0.0 0.0 - 0.2 /100 WBC Final     Lab Results   Component Value Date    GLUCOSE 135 (H) 03/02/2023    BUN 13 03/02/2023    CREATININE 0.67 03/02/2023    EGFR 115.6 03/02/2023    BCR 19.4 03/02/2023    K 3.5 03/02/2023    CO2 24.0 03/02/2023    CALCIUM 9.2 03/02/2023    ALBUMIN  4.4 03/02/2023    AST 20 03/02/2023    ALT 16 03/02/2023                Pathology (result reviewed):   2/1/23     DIAGNOSIS:   BREAST, BIOPSY, NEEDLE CORES, RIGHT:   Invasive ductal carcinoma, poorly differentiated (3+3+2)   Negative for estrogen receptors   Positive for progesterone receptors (1%)   Negative for HER2 overexpression (1+)                           Christie Griffith reports a pain score of 0.  Given her pain assessment as noted, treatment options were discussed and the following options were decided upon as a follow-up plan to address the patient's pain: continuation of current treatment plan for pain.    Patient screened negative for depression based on a PHQ-9 score of 0 on 3/2/2023.     Diagnosis:   (1) Right breast cancer   Stage IIB (cT2, cN0, cM0, G3)     ER 20% CA 1%   Poorly differentiated     Current therapy:   Dose dense AC   Cycle 1: 3/2/23     Assessment & Plan       Chronic, stable.  Patient with right breast cancer which is poorly differentiated weekly estrogen receptor positive.  Outside pathology report reviewed at length.  Many of this tumor does behave aggressively like triple negative breast cancer.  We discussed risk versus benefit of neoadjuvant chemotherapy at length.  Side effects discussed at length.  After lengthy discussion patient understood and was agreeable.  We will give her dose dense AC with Neulasta growth factor today.  We discussed management of chemotherapy-induced nausea/emesis.  Potential risk of febrile neutropenia and management discussed at length.        Recommendations:   Dose dense AC with Neulasta growth factor was signed on today's visit.  We will see her back in 2 weeks with CBC, CMP prior to cycle 2.          3/1/2023      CC:

## 2023-03-02 ENCOUNTER — INFUSION (OUTPATIENT)
Dept: ONCOLOGY | Facility: HOSPITAL | Age: 38
End: 2023-03-02
Payer: COMMERCIAL

## 2023-03-02 ENCOUNTER — OFFICE VISIT (OUTPATIENT)
Dept: ONCOLOGY | Facility: CLINIC | Age: 38
End: 2023-03-02
Payer: COMMERCIAL

## 2023-03-02 VITALS
WEIGHT: 165 LBS | BODY MASS INDEX: 25.84 KG/M2 | OXYGEN SATURATION: 99 % | SYSTOLIC BLOOD PRESSURE: 156 MMHG | TEMPERATURE: 97.3 F | HEART RATE: 110 BPM | DIASTOLIC BLOOD PRESSURE: 97 MMHG | RESPIRATION RATE: 18 BRPM

## 2023-03-02 VITALS — DIASTOLIC BLOOD PRESSURE: 85 MMHG | SYSTOLIC BLOOD PRESSURE: 158 MMHG | HEART RATE: 81 BPM

## 2023-03-02 DIAGNOSIS — Z17.0 MALIGNANT NEOPLASM OF UPPER-OUTER QUADRANT OF RIGHT BREAST IN FEMALE, ESTROGEN RECEPTOR POSITIVE: Primary | ICD-10-CM

## 2023-03-02 DIAGNOSIS — C50.411 MALIGNANT NEOPLASM OF UPPER-OUTER QUADRANT OF RIGHT BREAST IN FEMALE, ESTROGEN RECEPTOR POSITIVE: Primary | ICD-10-CM

## 2023-03-02 DIAGNOSIS — Z45.2 ENCOUNTER FOR VENOUS ACCESS DEVICE CARE: ICD-10-CM

## 2023-03-02 PROBLEM — R11.2 CINV (CHEMOTHERAPY-INDUCED NAUSEA AND VOMITING): Status: ACTIVE | Noted: 2023-03-02

## 2023-03-02 PROBLEM — T45.1X5A CINV (CHEMOTHERAPY-INDUCED NAUSEA AND VOMITING): Status: ACTIVE | Noted: 2023-03-02

## 2023-03-02 LAB
ALBUMIN SERPL-MCNC: 4.4 G/DL (ref 3.5–5.2)
ALBUMIN/GLOB SERPL: 1.8 G/DL
ALP SERPL-CCNC: 59 U/L (ref 39–117)
ALT SERPL W P-5'-P-CCNC: 16 U/L (ref 1–33)
ANION GAP SERPL CALCULATED.3IONS-SCNC: 10 MMOL/L (ref 5–15)
AST SERPL-CCNC: 20 U/L (ref 1–32)
B-HCG UR QL: NEGATIVE
BASOPHILS # BLD AUTO: 0.06 10*3/MM3 (ref 0–0.2)
BASOPHILS NFR BLD AUTO: 1 % (ref 0–1.5)
BILIRUB SERPL-MCNC: 1.3 MG/DL (ref 0–1.2)
BUN SERPL-MCNC: 13 MG/DL (ref 6–20)
BUN/CREAT SERPL: 19.4 (ref 7–25)
CALCIUM SPEC-SCNC: 9.2 MG/DL (ref 8.6–10.5)
CHLORIDE SERPL-SCNC: 105 MMOL/L (ref 98–107)
CO2 SERPL-SCNC: 24 MMOL/L (ref 22–29)
CREAT SERPL-MCNC: 0.67 MG/DL (ref 0.57–1)
DEPRECATED RDW RBC AUTO: 39.8 FL (ref 37–54)
EGFRCR SERPLBLD CKD-EPI 2021: 115.6 ML/MIN/1.73
EOSINOPHIL # BLD AUTO: 0.04 10*3/MM3 (ref 0–0.4)
EOSINOPHIL NFR BLD AUTO: 0.6 % (ref 0.3–6.2)
ERYTHROCYTE [DISTWIDTH] IN BLOOD BY AUTOMATED COUNT: 12.6 % (ref 12.3–15.4)
GLOBULIN UR ELPH-MCNC: 2.4 GM/DL
GLUCOSE SERPL-MCNC: 135 MG/DL (ref 65–99)
HCT VFR BLD AUTO: 39 % (ref 34–46.6)
HGB BLD-MCNC: 13.5 G/DL (ref 12–15.9)
HOLD SPECIMEN: NORMAL
HOLD SPECIMEN: NORMAL
IMM GRANULOCYTES # BLD AUTO: 0.02 10*3/MM3 (ref 0–0.05)
IMM GRANULOCYTES NFR BLD AUTO: 0.3 % (ref 0–0.5)
LYMPHOCYTES # BLD AUTO: 1.64 10*3/MM3 (ref 0.7–3.1)
LYMPHOCYTES NFR BLD AUTO: 26.1 % (ref 19.6–45.3)
MCH RBC QN AUTO: 30.1 PG (ref 26.6–33)
MCHC RBC AUTO-ENTMCNC: 34.6 G/DL (ref 31.5–35.7)
MCV RBC AUTO: 87.1 FL (ref 79–97)
MONOCYTES # BLD AUTO: 0.46 10*3/MM3 (ref 0.1–0.9)
MONOCYTES NFR BLD AUTO: 7.3 % (ref 5–12)
NEUTROPHILS NFR BLD AUTO: 4.06 10*3/MM3 (ref 1.7–7)
NEUTROPHILS NFR BLD AUTO: 64.7 % (ref 42.7–76)
NRBC BLD AUTO-RTO: 0 /100 WBC (ref 0–0.2)
PLATELET # BLD AUTO: 298 10*3/MM3 (ref 140–450)
PMV BLD AUTO: 10.9 FL (ref 6–12)
POTASSIUM SERPL-SCNC: 3.5 MMOL/L (ref 3.5–5.2)
PROT SERPL-MCNC: 6.8 G/DL (ref 6–8.5)
RBC # BLD AUTO: 4.48 10*6/MM3 (ref 3.77–5.28)
SODIUM SERPL-SCNC: 139 MMOL/L (ref 136–145)
WBC NRBC COR # BLD: 6.28 10*3/MM3 (ref 3.4–10.8)

## 2023-03-02 PROCEDURE — 36415 COLL VENOUS BLD VENIPUNCTURE: CPT

## 2023-03-02 PROCEDURE — 81025 URINE PREGNANCY TEST: CPT

## 2023-03-02 PROCEDURE — 85025 COMPLETE CBC W/AUTO DIFF WBC: CPT

## 2023-03-02 PROCEDURE — 25010000002 HEPARIN LOCK FLUSH PER 10 UNITS: Performed by: INTERNAL MEDICINE

## 2023-03-02 PROCEDURE — 96411 CHEMO IV PUSH ADDL DRUG: CPT | Performed by: INTERNAL MEDICINE

## 2023-03-02 PROCEDURE — 25010000002 FOSAPREPITANT PER 1 MG: Performed by: INTERNAL MEDICINE

## 2023-03-02 PROCEDURE — 96375 TX/PRO/DX INJ NEW DRUG ADDON: CPT | Performed by: INTERNAL MEDICINE

## 2023-03-02 PROCEDURE — 25010000002 CYCLOPHOSPHAMIDE 1 GM/5ML SOLUTION 5 ML VIAL: Performed by: INTERNAL MEDICINE

## 2023-03-02 PROCEDURE — 25010000002 DEXAMETHASONE SODIUM PHOSPHATE 100 MG/10ML SOLUTION: Performed by: INTERNAL MEDICINE

## 2023-03-02 PROCEDURE — 96413 CHEMO IV INFUSION 1 HR: CPT | Performed by: INTERNAL MEDICINE

## 2023-03-02 PROCEDURE — 99214 OFFICE O/P EST MOD 30 MIN: CPT | Performed by: INTERNAL MEDICINE

## 2023-03-02 PROCEDURE — 80053 COMPREHEN METABOLIC PANEL: CPT

## 2023-03-02 PROCEDURE — 25010000002 PALONOSETRON PER 25 MCG: Performed by: INTERNAL MEDICINE

## 2023-03-02 PROCEDURE — 25010000002 DOXORUBICIN PER 10 MG: Performed by: INTERNAL MEDICINE

## 2023-03-02 RX ORDER — SODIUM CHLORIDE 0.9 % (FLUSH) 0.9 %
10 SYRINGE (ML) INJECTION AS NEEDED
Status: DISCONTINUED | OUTPATIENT
Start: 2023-03-02 | End: 2023-03-02 | Stop reason: HOSPADM

## 2023-03-02 RX ORDER — SODIUM CHLORIDE 9 MG/ML
250 INJECTION, SOLUTION INTRAVENOUS ONCE
Status: COMPLETED | OUTPATIENT
Start: 2023-03-02 | End: 2023-03-02

## 2023-03-02 RX ORDER — DOXORUBICIN HYDROCHLORIDE 2 MG/ML
60 INJECTION, SOLUTION INTRAVENOUS ONCE
Status: CANCELLED | OUTPATIENT
Start: 2023-03-02

## 2023-03-02 RX ORDER — PALONOSETRON 0.05 MG/ML
0.25 INJECTION, SOLUTION INTRAVENOUS ONCE
Status: CANCELLED
Start: 2023-03-04 | End: 2023-03-04

## 2023-03-02 RX ORDER — SODIUM CHLORIDE 9 MG/ML
250 INJECTION, SOLUTION INTRAVENOUS ONCE
Status: CANCELLED | OUTPATIENT
Start: 2023-03-02

## 2023-03-02 RX ORDER — OLANZAPINE 5 MG/1
5 TABLET ORAL ONCE
Status: CANCELLED | OUTPATIENT
Start: 2023-03-02 | End: 2023-03-02

## 2023-03-02 RX ORDER — HEPARIN SODIUM (PORCINE) LOCK FLUSH IV SOLN 100 UNIT/ML 100 UNIT/ML
500 SOLUTION INTRAVENOUS AS NEEDED
Status: DISCONTINUED | OUTPATIENT
Start: 2023-03-02 | End: 2023-03-02 | Stop reason: HOSPADM

## 2023-03-02 RX ORDER — HEPARIN SODIUM (PORCINE) LOCK FLUSH IV SOLN 100 UNIT/ML 100 UNIT/ML
500 SOLUTION INTRAVENOUS AS NEEDED
Status: CANCELLED | OUTPATIENT
Start: 2023-03-02

## 2023-03-02 RX ORDER — SODIUM CHLORIDE 0.9 % (FLUSH) 0.9 %
10 SYRINGE (ML) INJECTION AS NEEDED
Status: CANCELLED | OUTPATIENT
Start: 2023-03-02

## 2023-03-02 RX ORDER — PALONOSETRON 0.05 MG/ML
0.25 INJECTION, SOLUTION INTRAVENOUS ONCE
Status: COMPLETED | OUTPATIENT
Start: 2023-03-02 | End: 2023-03-02

## 2023-03-02 RX ORDER — OLANZAPINE 5 MG/1
5 TABLET ORAL ONCE
Status: COMPLETED | OUTPATIENT
Start: 2023-03-02 | End: 2023-03-02

## 2023-03-02 RX ORDER — PALONOSETRON 0.05 MG/ML
0.25 INJECTION, SOLUTION INTRAVENOUS ONCE
Status: CANCELLED | OUTPATIENT
Start: 2023-03-02

## 2023-03-02 RX ORDER — DOXORUBICIN HYDROCHLORIDE 2 MG/ML
60 INJECTION, SOLUTION INTRAVENOUS ONCE
Status: COMPLETED | OUTPATIENT
Start: 2023-03-02 | End: 2023-03-02

## 2023-03-02 RX ADMIN — DEXAMETHASONE SODIUM PHOSPHATE 12 MG: 10 INJECTION, SOLUTION INTRAMUSCULAR; INTRAVENOUS at 09:22

## 2023-03-02 RX ADMIN — FOSAPREPITANT 100 ML: 150 INJECTION, POWDER, LYOPHILIZED, FOR SOLUTION INTRAVENOUS at 09:01

## 2023-03-02 RX ADMIN — DOXORUBICIN HYDROCHLORIDE 112 MG: 2 INJECTION, SOLUTION INTRAVENOUS at 10:00

## 2023-03-02 RX ADMIN — Medication 500 UNITS: at 11:20

## 2023-03-02 RX ADMIN — PALONOSETRON 0.25 MG: 0.05 INJECTION, SOLUTION INTRAVENOUS at 08:57

## 2023-03-02 RX ADMIN — OLANZAPINE 5 MG: 5 TABLET, FILM COATED ORAL at 08:59

## 2023-03-02 RX ADMIN — SODIUM CHLORIDE 250 ML: 9 INJECTION, SOLUTION INTRAVENOUS at 08:57

## 2023-03-02 RX ADMIN — CYCLOPHOSPHAMIDE 1120 MG: 200 INJECTION, SOLUTION INTRAVENOUS at 10:22

## 2023-03-02 RX ADMIN — Medication 10 ML: at 11:20

## 2023-03-02 NOTE — NURSING NOTE
After initiation of emend, patient started to have sudden onset nausea followed by flushing of her face and chest.  She also stated that it felt difficult to breathe and that she was having increased pressure in her head.  Emend immediately stopped, vitals assessed with elevated BP.  Physician notified.  Pt. States she has immediate relief of symptoms after a few minutes and feels better.  Vitals now back to patients normal.

## 2023-03-03 ENCOUNTER — INFUSION (OUTPATIENT)
Dept: ONCOLOGY | Facility: HOSPITAL | Age: 38
End: 2023-03-03
Payer: COMMERCIAL

## 2023-03-03 VITALS
HEART RATE: 71 BPM | RESPIRATION RATE: 18 BRPM | SYSTOLIC BLOOD PRESSURE: 134 MMHG | TEMPERATURE: 98.2 F | DIASTOLIC BLOOD PRESSURE: 71 MMHG

## 2023-03-03 DIAGNOSIS — Z45.2 ENCOUNTER FOR VENOUS ACCESS DEVICE CARE: ICD-10-CM

## 2023-03-03 DIAGNOSIS — Z17.0 MALIGNANT NEOPLASM OF UPPER-OUTER QUADRANT OF RIGHT BREAST IN FEMALE, ESTROGEN RECEPTOR POSITIVE: Primary | ICD-10-CM

## 2023-03-03 DIAGNOSIS — T45.1X5A CINV (CHEMOTHERAPY-INDUCED NAUSEA AND VOMITING): ICD-10-CM

## 2023-03-03 DIAGNOSIS — R11.2 CINV (CHEMOTHERAPY-INDUCED NAUSEA AND VOMITING): ICD-10-CM

## 2023-03-03 DIAGNOSIS — C50.411 MALIGNANT NEOPLASM OF UPPER-OUTER QUADRANT OF RIGHT BREAST IN FEMALE, ESTROGEN RECEPTOR POSITIVE: Primary | ICD-10-CM

## 2023-03-03 PROCEDURE — 25010000002 PALONOSETRON PER 25 MCG: Performed by: INTERNAL MEDICINE

## 2023-03-03 PROCEDURE — 96374 THER/PROPH/DIAG INJ IV PUSH: CPT | Performed by: INTERNAL MEDICINE

## 2023-03-03 PROCEDURE — 25010000002 HEPARIN LOCK FLUSH PER 10 UNITS: Performed by: INTERNAL MEDICINE

## 2023-03-03 PROCEDURE — 25010000002 PEGFILGRASTIM-CBQV 6 MG/0.6ML SOLUTION PREFILLED SYRINGE: Performed by: INTERNAL MEDICINE

## 2023-03-03 PROCEDURE — 96372 THER/PROPH/DIAG INJ SC/IM: CPT | Performed by: INTERNAL MEDICINE

## 2023-03-03 RX ORDER — SODIUM CHLORIDE 0.9 % (FLUSH) 0.9 %
10 SYRINGE (ML) INJECTION AS NEEDED
Status: CANCELLED | OUTPATIENT
Start: 2023-03-03

## 2023-03-03 RX ORDER — HEPARIN SODIUM (PORCINE) LOCK FLUSH IV SOLN 100 UNIT/ML 100 UNIT/ML
500 SOLUTION INTRAVENOUS AS NEEDED
Status: DISCONTINUED | OUTPATIENT
Start: 2023-03-03 | End: 2023-03-03 | Stop reason: HOSPADM

## 2023-03-03 RX ORDER — HEPARIN SODIUM (PORCINE) LOCK FLUSH IV SOLN 100 UNIT/ML 100 UNIT/ML
500 SOLUTION INTRAVENOUS AS NEEDED
Status: CANCELLED | OUTPATIENT
Start: 2023-03-03

## 2023-03-03 RX ORDER — PALONOSETRON 0.05 MG/ML
0.25 INJECTION, SOLUTION INTRAVENOUS ONCE
Status: DISCONTINUED | OUTPATIENT
Start: 2023-03-04 | End: 2023-03-03

## 2023-03-03 RX ORDER — PALONOSETRON 0.05 MG/ML
0.25 INJECTION, SOLUTION INTRAVENOUS ONCE
Status: COMPLETED | OUTPATIENT
Start: 2023-03-03 | End: 2023-03-03

## 2023-03-03 RX ORDER — SODIUM CHLORIDE 0.9 % (FLUSH) 0.9 %
10 SYRINGE (ML) INJECTION AS NEEDED
Status: DISCONTINUED | OUTPATIENT
Start: 2023-03-03 | End: 2023-03-03 | Stop reason: HOSPADM

## 2023-03-03 RX ORDER — PALONOSETRON 0.05 MG/ML
0.25 INJECTION, SOLUTION INTRAVENOUS ONCE
Status: CANCELLED
Start: 2023-03-04 | End: 2023-03-04

## 2023-03-03 RX ADMIN — PALONOSETRON HYDROCHLORIDE 0.25 MG: 0.25 INJECTION, SOLUTION INTRAVENOUS at 14:03

## 2023-03-03 RX ADMIN — PEGFILGRASTIM-CBQV 6 MG: 6 INJECTION, SOLUTION SUBCUTANEOUS at 14:05

## 2023-03-03 RX ADMIN — Medication 500 UNITS: at 14:17

## 2023-03-03 RX ADMIN — Medication 10 ML: at 14:17

## 2023-03-07 ENCOUNTER — TELEPHONE (OUTPATIENT)
Dept: ONCOLOGY | Facility: HOSPITAL | Age: 38
End: 2023-03-07
Payer: COMMERCIAL

## 2023-03-09 RX ORDER — FAMOTIDINE 20 MG/1
20 TABLET, FILM COATED ORAL 2 TIMES DAILY
Qty: 120 TABLET | Refills: 0 | Status: SHIPPED | OUTPATIENT
Start: 2023-03-09

## 2023-03-16 ENCOUNTER — INFUSION (OUTPATIENT)
Dept: ONCOLOGY | Facility: HOSPITAL | Age: 38
End: 2023-03-16
Payer: COMMERCIAL

## 2023-03-16 ENCOUNTER — OFFICE VISIT (OUTPATIENT)
Dept: ONCOLOGY | Facility: CLINIC | Age: 38
End: 2023-03-16
Payer: COMMERCIAL

## 2023-03-16 VITALS
DIASTOLIC BLOOD PRESSURE: 88 MMHG | TEMPERATURE: 97.2 F | RESPIRATION RATE: 18 BRPM | SYSTOLIC BLOOD PRESSURE: 155 MMHG | HEART RATE: 108 BPM | WEIGHT: 161 LBS | BODY MASS INDEX: 25.22 KG/M2 | OXYGEN SATURATION: 97 %

## 2023-03-16 DIAGNOSIS — T45.1X5A CINV (CHEMOTHERAPY-INDUCED NAUSEA AND VOMITING): ICD-10-CM

## 2023-03-16 DIAGNOSIS — Z45.2 ENCOUNTER FOR VENOUS ACCESS DEVICE CARE: Primary | ICD-10-CM

## 2023-03-16 DIAGNOSIS — Z17.0 MALIGNANT NEOPLASM OF UPPER-OUTER QUADRANT OF RIGHT BREAST IN FEMALE, ESTROGEN RECEPTOR POSITIVE: ICD-10-CM

## 2023-03-16 DIAGNOSIS — R11.2 CINV (CHEMOTHERAPY-INDUCED NAUSEA AND VOMITING): ICD-10-CM

## 2023-03-16 DIAGNOSIS — Z79.899 OTHER LONG TERM (CURRENT) DRUG THERAPY: ICD-10-CM

## 2023-03-16 DIAGNOSIS — C50.411 MALIGNANT NEOPLASM OF UPPER-OUTER QUADRANT OF RIGHT BREAST IN FEMALE, ESTROGEN RECEPTOR POSITIVE: Primary | ICD-10-CM

## 2023-03-16 DIAGNOSIS — R21 SKIN RASH: ICD-10-CM

## 2023-03-16 DIAGNOSIS — C50.411 MALIGNANT NEOPLASM OF UPPER-OUTER QUADRANT OF RIGHT BREAST IN FEMALE, ESTROGEN RECEPTOR POSITIVE: ICD-10-CM

## 2023-03-16 DIAGNOSIS — Z17.0 MALIGNANT NEOPLASM OF UPPER-OUTER QUADRANT OF RIGHT BREAST IN FEMALE, ESTROGEN RECEPTOR POSITIVE: Primary | ICD-10-CM

## 2023-03-16 LAB
ALBUMIN SERPL-MCNC: 4.5 G/DL (ref 3.5–5.2)
ALBUMIN/GLOB SERPL: 1.8 G/DL
ALP SERPL-CCNC: 97 U/L (ref 39–117)
ALT SERPL W P-5'-P-CCNC: 30 U/L (ref 1–33)
ANION GAP SERPL CALCULATED.3IONS-SCNC: 9 MMOL/L (ref 5–15)
AST SERPL-CCNC: 17 U/L (ref 1–32)
BILIRUB SERPL-MCNC: 0.4 MG/DL (ref 0–1.2)
BUN SERPL-MCNC: 13 MG/DL (ref 6–20)
BUN/CREAT SERPL: 20.3 (ref 7–25)
CALCIUM SPEC-SCNC: 9.3 MG/DL (ref 8.6–10.5)
CHLORIDE SERPL-SCNC: 108 MMOL/L (ref 98–107)
CO2 SERPL-SCNC: 26 MMOL/L (ref 22–29)
CREAT SERPL-MCNC: 0.64 MG/DL (ref 0.57–1)
DEPRECATED RDW RBC AUTO: 37.1 FL (ref 37–54)
EGFRCR SERPLBLD CKD-EPI 2021: 116.9 ML/MIN/1.73
EOSINOPHIL # BLD MANUAL: 0.12 10*3/MM3 (ref 0–0.4)
EOSINOPHIL NFR BLD MANUAL: 2 % (ref 0.3–6.2)
ERYTHROCYTE [DISTWIDTH] IN BLOOD BY AUTOMATED COUNT: 12.3 % (ref 12.3–15.4)
GLOBULIN UR ELPH-MCNC: 2.5 GM/DL
GLUCOSE SERPL-MCNC: 109 MG/DL (ref 65–99)
HCT VFR BLD AUTO: 35.8 % (ref 34–46.6)
HGB BLD-MCNC: 12.6 G/DL (ref 12–15.9)
HOLD SPECIMEN: NORMAL
LYMPHOCYTES # BLD MANUAL: 2.65 10*3/MM3 (ref 0.7–3.1)
LYMPHOCYTES NFR BLD MANUAL: 7 % (ref 5–12)
MCH RBC QN AUTO: 30.2 PG (ref 26.6–33)
MCHC RBC AUTO-ENTMCNC: 35.2 G/DL (ref 31.5–35.7)
MCV RBC AUTO: 85.9 FL (ref 79–97)
MONOCYTES # BLD: 0.42 10*3/MM3 (ref 0.1–0.9)
NEUTROPHILS # BLD AUTO: 2.83 10*3/MM3 (ref 1.7–7)
NEUTROPHILS NFR BLD MANUAL: 44 % (ref 42.7–76)
NEUTS BAND NFR BLD MANUAL: 3 % (ref 0–5)
PLATELET # BLD AUTO: 301 10*3/MM3 (ref 140–450)
PMV BLD AUTO: 10.3 FL (ref 6–12)
POTASSIUM SERPL-SCNC: 3.9 MMOL/L (ref 3.5–5.2)
PROT SERPL-MCNC: 7 G/DL (ref 6–8.5)
RBC # BLD AUTO: 4.17 10*6/MM3 (ref 3.77–5.28)
RBC MORPH BLD: NORMAL
SMALL PLATELETS BLD QL SMEAR: ADEQUATE
SODIUM SERPL-SCNC: 143 MMOL/L (ref 136–145)
VARIANT LYMPHS NFR BLD MANUAL: 44 % (ref 19.6–45.3)
WBC MORPH BLD: NORMAL
WBC NRBC COR # BLD: 6.02 10*3/MM3 (ref 3.4–10.8)

## 2023-03-16 PROCEDURE — 96375 TX/PRO/DX INJ NEW DRUG ADDON: CPT | Performed by: INTERNAL MEDICINE

## 2023-03-16 PROCEDURE — 25010000002 PALONOSETRON PER 25 MCG: Performed by: INTERNAL MEDICINE

## 2023-03-16 PROCEDURE — 96413 CHEMO IV INFUSION 1 HR: CPT | Performed by: INTERNAL MEDICINE

## 2023-03-16 PROCEDURE — 85025 COMPLETE CBC W/AUTO DIFF WBC: CPT

## 2023-03-16 PROCEDURE — 25010000002 CYCLOPHOSPHAMIDE 1 GM/5ML SOLUTION 5 ML VIAL: Performed by: INTERNAL MEDICINE

## 2023-03-16 PROCEDURE — 80053 COMPREHEN METABOLIC PANEL: CPT

## 2023-03-16 PROCEDURE — 96411 CHEMO IV PUSH ADDL DRUG: CPT | Performed by: INTERNAL MEDICINE

## 2023-03-16 PROCEDURE — 99214 OFFICE O/P EST MOD 30 MIN: CPT | Performed by: INTERNAL MEDICINE

## 2023-03-16 PROCEDURE — 25010000002 HEPARIN LOCK FLUSH PER 10 UNITS: Performed by: INTERNAL MEDICINE

## 2023-03-16 PROCEDURE — 25010000002 DEXAMETHASONE SODIUM PHOSPHATE 100 MG/10ML SOLUTION: Performed by: INTERNAL MEDICINE

## 2023-03-16 PROCEDURE — 85007 BL SMEAR W/DIFF WBC COUNT: CPT

## 2023-03-16 PROCEDURE — 25010000002 DOXORUBICIN PER 10 MG: Performed by: INTERNAL MEDICINE

## 2023-03-16 RX ORDER — SODIUM CHLORIDE 9 MG/ML
250 INJECTION, SOLUTION INTRAVENOUS ONCE
Status: CANCELLED | OUTPATIENT
Start: 2023-03-16

## 2023-03-16 RX ORDER — SODIUM CHLORIDE 0.9 % (FLUSH) 0.9 %
10 SYRINGE (ML) INJECTION AS NEEDED
Status: CANCELLED | OUTPATIENT
Start: 2023-03-16

## 2023-03-16 RX ORDER — CLINDAMYCIN PHOSPHATE 10 MG/G
1 GEL TOPICAL 2 TIMES DAILY
Qty: 30 G | Refills: 0 | Status: SHIPPED | OUTPATIENT
Start: 2023-03-16

## 2023-03-16 RX ORDER — DOXORUBICIN HYDROCHLORIDE 2 MG/ML
60 INJECTION, SOLUTION INTRAVENOUS ONCE
Status: CANCELLED | OUTPATIENT
Start: 2023-03-16

## 2023-03-16 RX ORDER — HEPARIN SODIUM (PORCINE) LOCK FLUSH IV SOLN 100 UNIT/ML 100 UNIT/ML
500 SOLUTION INTRAVENOUS AS NEEDED
Status: CANCELLED | OUTPATIENT
Start: 2023-03-16

## 2023-03-16 RX ORDER — OLANZAPINE 5 MG/1
5 TABLET ORAL ONCE
Status: COMPLETED | OUTPATIENT
Start: 2023-03-16 | End: 2023-03-16

## 2023-03-16 RX ORDER — PALONOSETRON 0.05 MG/ML
0.25 INJECTION, SOLUTION INTRAVENOUS ONCE
Status: COMPLETED | OUTPATIENT
Start: 2023-03-16 | End: 2023-03-16

## 2023-03-16 RX ORDER — DOXORUBICIN HYDROCHLORIDE 2 MG/ML
60 INJECTION, SOLUTION INTRAVENOUS ONCE
Status: COMPLETED | OUTPATIENT
Start: 2023-03-16 | End: 2023-03-16

## 2023-03-16 RX ORDER — OLANZAPINE 5 MG/1
5 TABLET ORAL ONCE
Status: CANCELLED | OUTPATIENT
Start: 2023-03-16 | End: 2023-03-16

## 2023-03-16 RX ORDER — SODIUM CHLORIDE 0.9 % (FLUSH) 0.9 %
10 SYRINGE (ML) INJECTION AS NEEDED
Status: DISCONTINUED | OUTPATIENT
Start: 2023-03-16 | End: 2023-03-16 | Stop reason: HOSPADM

## 2023-03-16 RX ORDER — PALONOSETRON 0.05 MG/ML
0.25 INJECTION, SOLUTION INTRAVENOUS ONCE
Status: CANCELLED | OUTPATIENT
Start: 2023-03-16

## 2023-03-16 RX ORDER — SODIUM CHLORIDE 9 MG/ML
250 INJECTION, SOLUTION INTRAVENOUS ONCE
Status: COMPLETED | OUTPATIENT
Start: 2023-03-16 | End: 2023-03-16

## 2023-03-16 RX ORDER — HEPARIN SODIUM (PORCINE) LOCK FLUSH IV SOLN 100 UNIT/ML 100 UNIT/ML
500 SOLUTION INTRAVENOUS AS NEEDED
Status: DISCONTINUED | OUTPATIENT
Start: 2023-03-16 | End: 2023-03-16 | Stop reason: HOSPADM

## 2023-03-16 RX ADMIN — SODIUM CHLORIDE 250 ML: 9 INJECTION, SOLUTION INTRAVENOUS at 08:40

## 2023-03-16 RX ADMIN — DOXORUBICIN HYDROCHLORIDE 112 MG: 2 INJECTION, SOLUTION INTRAVENOUS at 09:31

## 2023-03-16 RX ADMIN — PALONOSETRON 0.25 MG: 0.05 INJECTION, SOLUTION INTRAVENOUS at 08:40

## 2023-03-16 RX ADMIN — CYCLOPHOSPHAMIDE 1120 MG: 200 INJECTION, SOLUTION INTRAVENOUS at 09:54

## 2023-03-16 RX ADMIN — OLANZAPINE 5 MG: 5 TABLET, FILM COATED ORAL at 08:40

## 2023-03-16 RX ADMIN — Medication 10 ML: at 10:53

## 2023-03-16 RX ADMIN — HEPARIN 500 UNITS: 100 SYRINGE at 10:53

## 2023-03-16 RX ADMIN — DEXAMETHASONE SODIUM PHOSPHATE 12 MG: 10 INJECTION, SOLUTION INTRAMUSCULAR; INTRAVENOUS at 08:55

## 2023-03-16 NOTE — PROGRESS NOTES
Subjective     Christie Griffith was seen in follow-up for breast cancer.  She is overall doing well.  Tolerated cycle 1 well without any major side effect.  Reports acneform papular rash on the face.  No mucositis.  No fever or chills.  Did have slight trouble with nausea but no emesis.    Past Medical History, Past Surgical History, Social History, Family History have been reviewed and are without significant changes except as mentioned.      Medications:  The current medication list was reviewed in the EMR    ALLERGIES:    Allergies   Allergen Reactions   • Emend [Fosaprepitant] Shortness Of Breath   • Transderm-Scop [Scopolamine] Dizziness       Objective      Vitals:    03/16/23 0806   BP: 155/88   Pulse: 108   Resp: 18   Temp: 97.2 °F (36.2 °C)   SpO2: 97%         Current Status 3/3/2023   ECOG score 0       Physical Exam  Vitals and nursing note reviewed.   Constitutional:       Appearance: Normal appearance.   Abdominal:      General: There is no distension.      Palpations: Abdomen is soft. There is no mass.      Tenderness: There is no abdominal tenderness.   Neurological:      General: No focal deficit present.      Mental Status: She is alert and oriented to person, place, and time. Mental status is at baseline.   Psychiatric:         Mood and Affect: Mood normal.         Behavior: Behavior normal.         Thought Content: Thought content normal.               RECENT LABS:Independently reviewed and summarized  Hematology WBC   Date Value Ref Range Status   03/02/2023 6.28 3.40 - 10.80 10*3/mm3 Final     RBC   Date Value Ref Range Status   03/02/2023 4.48 3.77 - 5.28 10*6/mm3 Final     Hemoglobin   Date Value Ref Range Status   03/02/2023 13.5 12.0 - 15.9 g/dL Final     Hematocrit   Date Value Ref Range Status   03/02/2023 39.0 34.0 - 46.6 % Final     Platelets   Date Value Ref Range Status   03/02/2023 298 140 - 450 10*3/mm3 Final       WBC   Date Value Ref Range Status   03/16/2023 6.02 3.40 - 10.80  10*3/mm3 Final     RBC   Date Value Ref Range Status   03/16/2023 4.17 3.77 - 5.28 10*6/mm3 Final     Hemoglobin   Date Value Ref Range Status   03/16/2023 12.6 12.0 - 15.9 g/dL Final     Hematocrit   Date Value Ref Range Status   03/16/2023 35.8 34.0 - 46.6 % Final     MCV   Date Value Ref Range Status   03/16/2023 85.9 79.0 - 97.0 fL Final     MCH   Date Value Ref Range Status   03/16/2023 30.2 26.6 - 33.0 pg Final     MCHC   Date Value Ref Range Status   03/16/2023 35.2 31.5 - 35.7 g/dL Final     RDW   Date Value Ref Range Status   03/16/2023 12.3 12.3 - 15.4 % Final     RDW-SD   Date Value Ref Range Status   03/16/2023 37.1 37.0 - 54.0 fl Final     MPV   Date Value Ref Range Status   03/16/2023 10.3 6.0 - 12.0 fL Final     Platelets   Date Value Ref Range Status   03/16/2023 301 140 - 450 10*3/mm3 Final     Neutrophil %   Date Value Ref Range Status   03/02/2023 64.7 42.7 - 76.0 % Final     Lymphocyte %   Date Value Ref Range Status   03/02/2023 26.1 19.6 - 45.3 % Final     Monocyte %   Date Value Ref Range Status   03/02/2023 7.3 5.0 - 12.0 % Final     Eosinophil %   Date Value Ref Range Status   03/02/2023 0.6 0.3 - 6.2 % Final     Basophil %   Date Value Ref Range Status   03/02/2023 1.0 0.0 - 1.5 % Final     Immature Grans %   Date Value Ref Range Status   03/02/2023 0.3 0.0 - 0.5 % Final     Neutrophils, Absolute   Date Value Ref Range Status   03/02/2023 4.06 1.70 - 7.00 10*3/mm3 Final     Lymphocytes, Absolute   Date Value Ref Range Status   03/02/2023 1.64 0.70 - 3.10 10*3/mm3 Final     Monocytes, Absolute   Date Value Ref Range Status   03/02/2023 0.46 0.10 - 0.90 10*3/mm3 Final     Eosinophils, Absolute   Date Value Ref Range Status   03/02/2023 0.04 0.00 - 0.40 10*3/mm3 Final     Basophils, Absolute   Date Value Ref Range Status   03/02/2023 0.06 0.00 - 0.20 10*3/mm3 Final     Immature Grans, Absolute   Date Value Ref Range Status   03/02/2023 0.02 0.00 - 0.05 10*3/mm3 Final     nRBC   Date Value Ref  Range Status   03/02/2023 0.0 0.0 - 0.2 /100 WBC Final     Lab Results   Component Value Date    GLUCOSE 109 (H) 03/16/2023    BUN 13 03/16/2023    CREATININE 0.64 03/16/2023    EGFR 116.9 03/16/2023    BCR 20.3 03/16/2023    K 3.9 03/16/2023    CO2 26.0 03/16/2023    CALCIUM 9.3 03/16/2023    ALBUMIN 4.5 03/16/2023    BILITOT 0.4 03/16/2023    AST 17 03/16/2023    ALT 30 03/16/2023            Christie Griffith reports a pain score of 0.  Given her pain assessment as noted, treatment options were discussed and the following options were decided upon as a follow-up plan to address the patient's pain: continuation of current treatment plan for pain.    Patient screened negative for depression based on a PHQ-9 score of 0 on 3/16/2023.     Diagnosis:   (1) Right breast cancer   Stage IIB (cT2, cN0, cM0, G3)      ER 20% PA 1%   Poorly differentiated      Current therapy:   Dose dense AC   Cycle 1: 3/2/23   Cycle 2: 3/16/23     (2) CINV   (3) Skin rash     Assessment & Plan     (1) Right breast cancer     Chronic, stable  Currently on neoadjuvant dose dense AC with Neulasta growth factor support.  Tolerated cycle 1 well without any major side effects.    On exam, tumor appears slightly smaller.  Margins are indistinct.    Labs look stable.  She is feeling well.    Cycle 2 dose dense AC with Neulasta growth factor was signed on today's visit.  I will see her back in 2 weeks with CBC, CMP prior to cycle 3.      (2) CINV     Patient was allergic to Emend.  She is at high risk for delayed chemotherapy-induced nausea emesis.  We are giving her IV Zofran and dexamethasone on day 2 to prevent delayed chemotherapy-induced nausea emesis.    (3) Skin rash     Patient with papular acneform rash on the face.  Could be from chemotherapy.  Other possibility include steroid.  I will give her clindamycin ointment.      3/16/2023      CC:

## 2023-03-17 ENCOUNTER — INFUSION (OUTPATIENT)
Dept: ONCOLOGY | Facility: HOSPITAL | Age: 38
End: 2023-03-17
Payer: COMMERCIAL

## 2023-03-17 VITALS
SYSTOLIC BLOOD PRESSURE: 141 MMHG | RESPIRATION RATE: 18 BRPM | HEART RATE: 77 BPM | DIASTOLIC BLOOD PRESSURE: 79 MMHG | TEMPERATURE: 98.6 F

## 2023-03-17 DIAGNOSIS — T45.1X5A CINV (CHEMOTHERAPY-INDUCED NAUSEA AND VOMITING): Primary | ICD-10-CM

## 2023-03-17 DIAGNOSIS — Z45.2 ENCOUNTER FOR VENOUS ACCESS DEVICE CARE: ICD-10-CM

## 2023-03-17 DIAGNOSIS — R11.2 CINV (CHEMOTHERAPY-INDUCED NAUSEA AND VOMITING): Primary | ICD-10-CM

## 2023-03-17 DIAGNOSIS — C50.411 MALIGNANT NEOPLASM OF UPPER-OUTER QUADRANT OF RIGHT BREAST IN FEMALE, ESTROGEN RECEPTOR POSITIVE: ICD-10-CM

## 2023-03-17 DIAGNOSIS — Z17.0 MALIGNANT NEOPLASM OF UPPER-OUTER QUADRANT OF RIGHT BREAST IN FEMALE, ESTROGEN RECEPTOR POSITIVE: ICD-10-CM

## 2023-03-17 PROCEDURE — 25010000002 PEGFILGRASTIM-CBQV 6 MG/0.6ML SOLUTION PREFILLED SYRINGE: Performed by: INTERNAL MEDICINE

## 2023-03-17 PROCEDURE — 96374 THER/PROPH/DIAG INJ IV PUSH: CPT | Performed by: INTERNAL MEDICINE

## 2023-03-17 PROCEDURE — 25010000002 DEXAMETHASONE SODIUM PHOSPHATE 100 MG/10ML SOLUTION 10 ML VIAL: Performed by: INTERNAL MEDICINE

## 2023-03-17 PROCEDURE — 96372 THER/PROPH/DIAG INJ SC/IM: CPT | Performed by: INTERNAL MEDICINE

## 2023-03-17 PROCEDURE — 25010000002 HEPARIN LOCK FLUSH PER 10 UNITS: Performed by: INTERNAL MEDICINE

## 2023-03-17 PROCEDURE — 25010000002 ONDANSETRON PER 1 MG: Performed by: INTERNAL MEDICINE

## 2023-03-17 RX ORDER — HEPARIN SODIUM (PORCINE) LOCK FLUSH IV SOLN 100 UNIT/ML 100 UNIT/ML
500 SOLUTION INTRAVENOUS AS NEEDED
Status: DISCONTINUED | OUTPATIENT
Start: 2023-03-17 | End: 2023-03-17 | Stop reason: HOSPADM

## 2023-03-17 RX ORDER — HEPARIN SODIUM (PORCINE) LOCK FLUSH IV SOLN 100 UNIT/ML 100 UNIT/ML
500 SOLUTION INTRAVENOUS AS NEEDED
Status: CANCELLED | OUTPATIENT
Start: 2023-03-17

## 2023-03-17 RX ORDER — SODIUM CHLORIDE 0.9 % (FLUSH) 0.9 %
10 SYRINGE (ML) INJECTION AS NEEDED
Status: DISCONTINUED | OUTPATIENT
Start: 2023-03-17 | End: 2023-03-17 | Stop reason: HOSPADM

## 2023-03-17 RX ORDER — SODIUM CHLORIDE 0.9 % (FLUSH) 0.9 %
10 SYRINGE (ML) INJECTION AS NEEDED
Status: CANCELLED | OUTPATIENT
Start: 2023-03-17

## 2023-03-17 RX ADMIN — HEPARIN 500 UNITS: 100 SYRINGE at 12:09

## 2023-03-17 RX ADMIN — PEGFILGRASTIM-CBQV 6 MG: 6 INJECTION, SOLUTION SUBCUTANEOUS at 11:37

## 2023-03-17 RX ADMIN — DEXAMETHASONE SODIUM PHOSPHATE: 10 INJECTION, SOLUTION INTRAMUSCULAR; INTRAVENOUS at 11:37

## 2023-03-17 RX ADMIN — Medication 10 ML: at 12:09

## 2023-03-30 ENCOUNTER — INFUSION (OUTPATIENT)
Dept: ONCOLOGY | Facility: HOSPITAL | Age: 38
End: 2023-03-30
Payer: COMMERCIAL

## 2023-03-30 ENCOUNTER — OFFICE VISIT (OUTPATIENT)
Dept: ONCOLOGY | Facility: CLINIC | Age: 38
End: 2023-03-30
Payer: COMMERCIAL

## 2023-03-30 VITALS
WEIGHT: 159 LBS | RESPIRATION RATE: 18 BRPM | TEMPERATURE: 97.4 F | HEART RATE: 104 BPM | OXYGEN SATURATION: 98 % | DIASTOLIC BLOOD PRESSURE: 88 MMHG | SYSTOLIC BLOOD PRESSURE: 142 MMHG | BODY MASS INDEX: 24.9 KG/M2

## 2023-03-30 DIAGNOSIS — Z17.0 MALIGNANT NEOPLASM OF UPPER-OUTER QUADRANT OF RIGHT BREAST IN FEMALE, ESTROGEN RECEPTOR POSITIVE: ICD-10-CM

## 2023-03-30 DIAGNOSIS — Z17.0 MALIGNANT NEOPLASM OF UPPER-OUTER QUADRANT OF RIGHT BREAST IN FEMALE, ESTROGEN RECEPTOR POSITIVE: Primary | ICD-10-CM

## 2023-03-30 DIAGNOSIS — Z45.2 ENCOUNTER FOR VENOUS ACCESS DEVICE CARE: ICD-10-CM

## 2023-03-30 DIAGNOSIS — C50.411 MALIGNANT NEOPLASM OF UPPER-OUTER QUADRANT OF RIGHT BREAST IN FEMALE, ESTROGEN RECEPTOR POSITIVE: ICD-10-CM

## 2023-03-30 DIAGNOSIS — R11.2 CINV (CHEMOTHERAPY-INDUCED NAUSEA AND VOMITING): ICD-10-CM

## 2023-03-30 DIAGNOSIS — T45.1X5A CINV (CHEMOTHERAPY-INDUCED NAUSEA AND VOMITING): ICD-10-CM

## 2023-03-30 DIAGNOSIS — C50.411 MALIGNANT NEOPLASM OF UPPER-OUTER QUADRANT OF RIGHT BREAST IN FEMALE, ESTROGEN RECEPTOR POSITIVE: Primary | ICD-10-CM

## 2023-03-30 DIAGNOSIS — K12.31 MUCOSITIS DUE TO ANTINEOPLASTIC THERAPY: ICD-10-CM

## 2023-03-30 LAB
ALBUMIN SERPL-MCNC: 4.3 G/DL (ref 3.5–5.2)
ALBUMIN/GLOB SERPL: 1.7 G/DL
ALP SERPL-CCNC: 102 U/L (ref 39–117)
ALT SERPL W P-5'-P-CCNC: 54 U/L (ref 1–33)
ANION GAP SERPL CALCULATED.3IONS-SCNC: 11 MMOL/L (ref 5–15)
ANISOCYTOSIS BLD QL: ABNORMAL
AST SERPL-CCNC: 40 U/L (ref 1–32)
BILIRUB SERPL-MCNC: 0.6 MG/DL (ref 0–1.2)
BUN SERPL-MCNC: 14 MG/DL (ref 6–20)
BUN/CREAT SERPL: 21.2 (ref 7–25)
CALCIUM SPEC-SCNC: 9.5 MG/DL (ref 8.6–10.5)
CHLORIDE SERPL-SCNC: 106 MMOL/L (ref 98–107)
CO2 SERPL-SCNC: 25 MMOL/L (ref 22–29)
CREAT SERPL-MCNC: 0.66 MG/DL (ref 0.57–1)
DEPRECATED RDW RBC AUTO: 38.2 FL (ref 37–54)
EGFRCR SERPLBLD CKD-EPI 2021: 116 ML/MIN/1.73
EOSINOPHIL # BLD MANUAL: 0.06 10*3/MM3 (ref 0–0.4)
EOSINOPHIL NFR BLD MANUAL: 1 % (ref 0.3–6.2)
ERYTHROCYTE [DISTWIDTH] IN BLOOD BY AUTOMATED COUNT: 13.5 % (ref 12.3–15.4)
GLOBULIN UR ELPH-MCNC: 2.6 GM/DL
GLUCOSE SERPL-MCNC: 113 MG/DL (ref 65–99)
HCT VFR BLD AUTO: 35.7 % (ref 34–46.6)
HGB BLD-MCNC: 12.7 G/DL (ref 12–15.9)
HOLD SPECIMEN: NORMAL
LYMPHOCYTES # BLD MANUAL: 1.27 10*3/MM3 (ref 0.7–3.1)
LYMPHOCYTES NFR BLD MANUAL: 9 % (ref 5–12)
MCH RBC QN AUTO: 30.7 PG (ref 26.6–33)
MCHC RBC AUTO-ENTMCNC: 35.6 G/DL (ref 31.5–35.7)
MCV RBC AUTO: 86.2 FL (ref 79–97)
METAMYELOCYTES NFR BLD MANUAL: 6 % (ref 0–0)
MONOCYTES # BLD: 0.5 10*3/MM3 (ref 0.1–0.9)
NEUTROPHILS # BLD AUTO: 3.36 10*3/MM3 (ref 1.7–7)
NEUTROPHILS NFR BLD MANUAL: 55 % (ref 42.7–76)
NEUTS BAND NFR BLD MANUAL: 6 % (ref 0–5)
PLATELET # BLD AUTO: 325 10*3/MM3 (ref 140–450)
PMV BLD AUTO: 10.3 FL (ref 6–12)
POTASSIUM SERPL-SCNC: 4.3 MMOL/L (ref 3.5–5.2)
PROT SERPL-MCNC: 6.9 G/DL (ref 6–8.5)
RBC # BLD AUTO: 4.14 10*6/MM3 (ref 3.77–5.28)
SMALL PLATELETS BLD QL SMEAR: ADEQUATE
SODIUM SERPL-SCNC: 142 MMOL/L (ref 136–145)
VARIANT LYMPHS NFR BLD MANUAL: 1 % (ref 0–5)
VARIANT LYMPHS NFR BLD MANUAL: 22 % (ref 19.6–45.3)
WBC MORPH BLD: NORMAL
WBC NRBC COR # BLD: 5.51 10*3/MM3 (ref 3.4–10.8)

## 2023-03-30 PROCEDURE — 36415 COLL VENOUS BLD VENIPUNCTURE: CPT

## 2023-03-30 PROCEDURE — 96375 TX/PRO/DX INJ NEW DRUG ADDON: CPT | Performed by: INTERNAL MEDICINE

## 2023-03-30 PROCEDURE — 25010000002 PALONOSETRON PER 25 MCG: Performed by: INTERNAL MEDICINE

## 2023-03-30 PROCEDURE — 25010000002 CYCLOPHOSPHAMIDE 1 GM/5ML SOLUTION 5 ML VIAL: Performed by: INTERNAL MEDICINE

## 2023-03-30 PROCEDURE — 25010000002 DOXORUBICIN PER 10 MG: Performed by: INTERNAL MEDICINE

## 2023-03-30 PROCEDURE — 25010000002 DEXAMETHASONE SODIUM PHOSPHATE 100 MG/10ML SOLUTION: Performed by: INTERNAL MEDICINE

## 2023-03-30 PROCEDURE — 25010000002 HEPARIN LOCK FLUSH PER 10 UNITS: Performed by: INTERNAL MEDICINE

## 2023-03-30 PROCEDURE — 96411 CHEMO IV PUSH ADDL DRUG: CPT | Performed by: INTERNAL MEDICINE

## 2023-03-30 PROCEDURE — 85007 BL SMEAR W/DIFF WBC COUNT: CPT

## 2023-03-30 PROCEDURE — 80053 COMPREHEN METABOLIC PANEL: CPT

## 2023-03-30 PROCEDURE — 85025 COMPLETE CBC W/AUTO DIFF WBC: CPT

## 2023-03-30 PROCEDURE — 96413 CHEMO IV INFUSION 1 HR: CPT | Performed by: INTERNAL MEDICINE

## 2023-03-30 RX ORDER — PALONOSETRON 0.05 MG/ML
0.25 INJECTION, SOLUTION INTRAVENOUS ONCE
Status: CANCELLED | OUTPATIENT
Start: 2023-03-30

## 2023-03-30 RX ORDER — SODIUM CHLORIDE 0.9 % (FLUSH) 0.9 %
10 SYRINGE (ML) INJECTION AS NEEDED
Status: CANCELLED | OUTPATIENT
Start: 2023-03-30

## 2023-03-30 RX ORDER — SODIUM CHLORIDE 9 MG/ML
250 INJECTION, SOLUTION INTRAVENOUS ONCE
Status: CANCELLED | OUTPATIENT
Start: 2023-03-30

## 2023-03-30 RX ORDER — HEPARIN SODIUM (PORCINE) LOCK FLUSH IV SOLN 100 UNIT/ML 100 UNIT/ML
500 SOLUTION INTRAVENOUS AS NEEDED
Status: CANCELLED | OUTPATIENT
Start: 2023-03-30

## 2023-03-30 RX ORDER — HEPARIN SODIUM (PORCINE) LOCK FLUSH IV SOLN 100 UNIT/ML 100 UNIT/ML
500 SOLUTION INTRAVENOUS AS NEEDED
Status: DISCONTINUED | OUTPATIENT
Start: 2023-03-30 | End: 2023-03-30 | Stop reason: HOSPADM

## 2023-03-30 RX ORDER — OLANZAPINE 5 MG/1
5 TABLET ORAL ONCE
Status: CANCELLED | OUTPATIENT
Start: 2023-03-30 | End: 2023-03-30

## 2023-03-30 RX ORDER — DOXORUBICIN HYDROCHLORIDE 2 MG/ML
60 INJECTION, SOLUTION INTRAVENOUS ONCE
Status: CANCELLED | OUTPATIENT
Start: 2023-03-30

## 2023-03-30 RX ORDER — DOXORUBICIN HYDROCHLORIDE 2 MG/ML
60 INJECTION, SOLUTION INTRAVENOUS ONCE
Status: COMPLETED | OUTPATIENT
Start: 2023-03-30 | End: 2023-03-30

## 2023-03-30 RX ORDER — PROMETHAZINE HYDROCHLORIDE 25 MG/ML
12.5 INJECTION, SOLUTION INTRAMUSCULAR; INTRAVENOUS ONCE
Status: CANCELLED
Start: 2023-03-31 | End: 2023-03-31

## 2023-03-30 RX ORDER — PALONOSETRON 0.05 MG/ML
0.25 INJECTION, SOLUTION INTRAVENOUS ONCE
Status: COMPLETED | OUTPATIENT
Start: 2023-03-30 | End: 2023-03-30

## 2023-03-30 RX ORDER — OLANZAPINE 5 MG/1
5 TABLET ORAL ONCE
Status: COMPLETED | OUTPATIENT
Start: 2023-03-30 | End: 2023-03-30

## 2023-03-30 RX ORDER — SODIUM CHLORIDE 0.9 % (FLUSH) 0.9 %
10 SYRINGE (ML) INJECTION AS NEEDED
Status: DISCONTINUED | OUTPATIENT
Start: 2023-03-30 | End: 2023-03-30 | Stop reason: HOSPADM

## 2023-03-30 RX ORDER — PROMETHAZINE HYDROCHLORIDE 12.5 MG/1
12.5 TABLET ORAL EVERY 6 HOURS PRN
Qty: 90 TABLET | Refills: 0 | Status: SHIPPED | OUTPATIENT
Start: 2023-03-30

## 2023-03-30 RX ORDER — SODIUM CHLORIDE 9 MG/ML
250 INJECTION, SOLUTION INTRAVENOUS ONCE
Status: COMPLETED | OUTPATIENT
Start: 2023-03-30 | End: 2023-03-30

## 2023-03-30 RX ADMIN — DOXORUBICIN HYDROCHLORIDE 110 MG: 2 INJECTION, SOLUTION INTRAVENOUS at 10:44

## 2023-03-30 RX ADMIN — DEXAMETHASONE SODIUM PHOSPHATE 12 MG: 10 INJECTION, SOLUTION INTRAMUSCULAR; INTRAVENOUS at 09:55

## 2023-03-30 RX ADMIN — Medication 10 ML: at 11:58

## 2023-03-30 RX ADMIN — CYCLOPHOSPHAMIDE 1100 MG: 200 INJECTION, SOLUTION INTRAVENOUS at 11:06

## 2023-03-30 RX ADMIN — HEPARIN 500 UNITS: 100 SYRINGE at 11:58

## 2023-03-30 RX ADMIN — SODIUM CHLORIDE 250 ML: 9 INJECTION, SOLUTION INTRAVENOUS at 09:22

## 2023-03-30 RX ADMIN — OLANZAPINE 5 MG: 5 TABLET, FILM COATED ORAL at 09:22

## 2023-03-30 RX ADMIN — PALONOSETRON 0.25 MG: 0.05 INJECTION, SOLUTION INTRAVENOUS at 09:22

## 2023-03-30 NOTE — PROGRESS NOTES
Subjective     Christie Griffith was seen in follow-up for breast cancer.  She is on neoadjuvant chemotherapy.  Tolerating treatment overall well.  Continue struggle with delayed chemotherapy-induced nausea and emesis on day 2-4.  Management discussed at length.  Intermittent mouth sores which is responded to Magic mouthwash.  Acneform rash on face has improved.    Past Medical History, Past Surgical History, Social History, Family History have been reviewed and are without significant changes except as mentioned.        Medications:  The current medication list was reviewed in the EMR    ALLERGIES:    Allergies   Allergen Reactions   • Emend [Fosaprepitant] Shortness Of Breath   • Transderm-Scop [Scopolamine] Dizziness       Objective      Vitals:    03/30/23 0824   BP: 142/88   Pulse: 104   Resp: 18   Temp: 97.4 °F (36.3 °C)   SpO2: 98%   Weight: 72.1 kg (159 lb)   PainSc: 2  Comment: back spasm     Current Status 3/17/2023   ECOG score 0       Physical Exam  Vitals and nursing note reviewed. Exam conducted with a chaperone present.   Constitutional:       Appearance: Normal appearance.   Musculoskeletal:      Comments: Breast mass has resolved.    Skin:     General: Skin is dry.      Findings: No bruising.   Neurological:      General: No focal deficit present.      Mental Status: She is alert and oriented to person, place, and time. Mental status is at baseline.   Psychiatric:         Mood and Affect: Mood normal.         Behavior: Behavior normal.         Thought Content: Thought content normal.               RECENT LABS:Independently reviewed and summarized  Hematology WBC   Date Value Ref Range Status   03/16/2023 6.02 3.40 - 10.80 10*3/mm3 Final     RBC   Date Value Ref Range Status   03/16/2023 4.17 3.77 - 5.28 10*6/mm3 Final     Hemoglobin   Date Value Ref Range Status   03/16/2023 12.6 12.0 - 15.9 g/dL Final     Hematocrit   Date Value Ref Range Status   03/16/2023 35.8 34.0 - 46.6 % Final     Platelets    Date Value Ref Range Status   03/16/2023 301 140 - 450 10*3/mm3 Final     WBC   Date Value Ref Range Status   03/30/2023 5.51 3.40 - 10.80 10*3/mm3 Final     RBC   Date Value Ref Range Status   03/30/2023 4.14 3.77 - 5.28 10*6/mm3 Final     Hemoglobin   Date Value Ref Range Status   03/30/2023 12.7 12.0 - 15.9 g/dL Final     Hematocrit   Date Value Ref Range Status   03/30/2023 35.7 34.0 - 46.6 % Final     MCV   Date Value Ref Range Status   03/30/2023 86.2 79.0 - 97.0 fL Final     MCH   Date Value Ref Range Status   03/30/2023 30.7 26.6 - 33.0 pg Final     MCHC   Date Value Ref Range Status   03/30/2023 35.6 31.5 - 35.7 g/dL Final     RDW   Date Value Ref Range Status   03/30/2023 13.5 12.3 - 15.4 % Final     RDW-SD   Date Value Ref Range Status   03/30/2023 38.2 37.0 - 54.0 fl Final     MPV   Date Value Ref Range Status   03/30/2023 10.3 6.0 - 12.0 fL Final     Platelets   Date Value Ref Range Status   03/30/2023 325 140 - 450 10*3/mm3 Final     Neutrophil %   Date Value Ref Range Status   03/02/2023 64.7 42.7 - 76.0 % Final     Lymphocyte %   Date Value Ref Range Status   03/02/2023 26.1 19.6 - 45.3 % Final     Monocyte %   Date Value Ref Range Status   03/02/2023 7.3 5.0 - 12.0 % Final     Eosinophil %   Date Value Ref Range Status   03/02/2023 0.6 0.3 - 6.2 % Final     Basophil %   Date Value Ref Range Status   03/02/2023 1.0 0.0 - 1.5 % Final     Immature Grans %   Date Value Ref Range Status   03/02/2023 0.3 0.0 - 0.5 % Final     Neutrophils Absolute   Date Value Ref Range Status   03/30/2023 3.36 1.70 - 7.00 10*3/mm3 Final     Neutrophils, Absolute   Date Value Ref Range Status   03/02/2023 4.06 1.70 - 7.00 10*3/mm3 Final     Lymphocytes, Absolute   Date Value Ref Range Status   03/02/2023 1.64 0.70 - 3.10 10*3/mm3 Final     Monocytes, Absolute   Date Value Ref Range Status   03/02/2023 0.46 0.10 - 0.90 10*3/mm3 Final     Eosinophils Absolute   Date Value Ref Range Status   03/30/2023 0.06 0.00 - 0.40  10*3/mm3 Final     Eosinophils, Absolute   Date Value Ref Range Status   03/02/2023 0.04 0.00 - 0.40 10*3/mm3 Final     Basophils, Absolute   Date Value Ref Range Status   03/02/2023 0.06 0.00 - 0.20 10*3/mm3 Final     Immature Grans, Absolute   Date Value Ref Range Status   03/02/2023 0.02 0.00 - 0.05 10*3/mm3 Final     nRBC   Date Value Ref Range Status   03/02/2023 0.0 0.0 - 0.2 /100 WBC Final     Lab Results   Component Value Date    GLUCOSE 113 (H) 03/30/2023    BUN 14 03/30/2023    CREATININE 0.66 03/30/2023    EGFR 116.0 03/30/2023    BCR 21.2 03/30/2023    K 4.3 03/30/2023    CO2 25.0 03/30/2023    CALCIUM 9.5 03/30/2023    ALBUMIN 4.3 03/30/2023    BILITOT 0.6 03/30/2023    AST 40 (H) 03/30/2023    ALT 54 (H) 03/30/2023              Christie BARNES Gladis reports a pain score of 2.  Given her pain assessment as noted, treatment options were discussed and the following options were decided upon as a follow-up plan to address the patient's pain: continuation of current treatment plan for pain.    Patient screened negative for depression based on a PHQ-9 score of 0 on 3/30/2023.    Diagnosis:   (1) Right breast cancer   Stage IIB (cT2, cN0, cM0, G3)      ER 20% NC 1%   Poorly differentiated      Current therapy:   Dose dense AC   Cycle 1: 3/2/23   Cycle 2: 3/16/23   Cycle 3: 3/20/23      (2) CINV   (3) Skin rash   (4) Mucositis     Assessment & Plan     (1) Right breast cancer     Chronic, stable.  Currently on neoadjuvant chemotherapy.  Tolerating treatment well without any major dose-limiting side effects.  On exam, it appears that right breast mass has almost resolved.  Unable to palpate it on today's exam.  Her labs look stable.  She feels well.    Cycle 3 dose dense AC with Neulasta growth factor was signed on today's visit.    I will see her back in 2 weeks with CBC, CMP prior to cycle 4.    (2) CINV     Patient is struggling with delayed chemotherapy-induced nausea and emesis.  She was allergic to Emend.  I  will give her IV Zofran, IV Phenergan and IV dexamethasone tomorrow to prevent chemotherapy-induced nausea and emesis.  I will also send prescription of Phenergan to her pharmacy which she can take in combination with Zofran.    (3) Skin rash     Acneform rash involving face.  This is improved with clindamycin ointment.  She will continue this.    (4) Mucositis     New issue.  Mucositis secondary to chemotherapy.  Continue Magic mouthwash as needed.    3/30/2023      CC:

## 2023-03-31 ENCOUNTER — INFUSION (OUTPATIENT)
Dept: ONCOLOGY | Facility: HOSPITAL | Age: 38
End: 2023-03-31
Payer: COMMERCIAL

## 2023-03-31 VITALS
TEMPERATURE: 97.6 F | SYSTOLIC BLOOD PRESSURE: 141 MMHG | RESPIRATION RATE: 18 BRPM | DIASTOLIC BLOOD PRESSURE: 65 MMHG | HEART RATE: 85 BPM

## 2023-03-31 DIAGNOSIS — T45.1X5A CINV (CHEMOTHERAPY-INDUCED NAUSEA AND VOMITING): ICD-10-CM

## 2023-03-31 DIAGNOSIS — C50.411 MALIGNANT NEOPLASM OF UPPER-OUTER QUADRANT OF RIGHT BREAST IN FEMALE, ESTROGEN RECEPTOR POSITIVE: Primary | ICD-10-CM

## 2023-03-31 DIAGNOSIS — R11.2 CINV (CHEMOTHERAPY-INDUCED NAUSEA AND VOMITING): ICD-10-CM

## 2023-03-31 DIAGNOSIS — F90.2 ATTENTION DEFICIT HYPERACTIVITY DISORDER, COMBINED TYPE: ICD-10-CM

## 2023-03-31 DIAGNOSIS — Z45.2 ENCOUNTER FOR VENOUS ACCESS DEVICE CARE: ICD-10-CM

## 2023-03-31 DIAGNOSIS — Z17.0 MALIGNANT NEOPLASM OF UPPER-OUTER QUADRANT OF RIGHT BREAST IN FEMALE, ESTROGEN RECEPTOR POSITIVE: Primary | ICD-10-CM

## 2023-03-31 PROCEDURE — 96375 TX/PRO/DX INJ NEW DRUG ADDON: CPT | Performed by: INTERNAL MEDICINE

## 2023-03-31 PROCEDURE — 96372 THER/PROPH/DIAG INJ SC/IM: CPT | Performed by: INTERNAL MEDICINE

## 2023-03-31 PROCEDURE — 25010000002 HEPARIN LOCK FLUSH PER 10 UNITS: Performed by: INTERNAL MEDICINE

## 2023-03-31 PROCEDURE — 96374 THER/PROPH/DIAG INJ IV PUSH: CPT | Performed by: INTERNAL MEDICINE

## 2023-03-31 PROCEDURE — 25010000002 PEGFILGRASTIM-CBQV 6 MG/0.6ML SOLUTION PREFILLED SYRINGE: Performed by: INTERNAL MEDICINE

## 2023-03-31 PROCEDURE — 25010000002 DEXAMETHASONE SODIUM PHOSPHATE 100 MG/10ML SOLUTION 10 ML VIAL: Performed by: INTERNAL MEDICINE

## 2023-03-31 PROCEDURE — 25010000002 ONDANSETRON PER 1 MG: Performed by: INTERNAL MEDICINE

## 2023-03-31 PROCEDURE — 25010000002 PROMETHAZINE PER 50 MG: Performed by: INTERNAL MEDICINE

## 2023-03-31 RX ORDER — HEPARIN SODIUM (PORCINE) LOCK FLUSH IV SOLN 100 UNIT/ML 100 UNIT/ML
500 SOLUTION INTRAVENOUS AS NEEDED
Status: DISCONTINUED | OUTPATIENT
Start: 2023-03-31 | End: 2023-03-31 | Stop reason: HOSPADM

## 2023-03-31 RX ORDER — PROMETHAZINE HYDROCHLORIDE 25 MG/ML
12.5 INJECTION, SOLUTION INTRAMUSCULAR; INTRAVENOUS ONCE
Status: CANCELLED
Start: 2023-03-31 | End: 2023-03-31

## 2023-03-31 RX ORDER — SODIUM CHLORIDE 0.9 % (FLUSH) 0.9 %
10 SYRINGE (ML) INJECTION AS NEEDED
Status: DISCONTINUED | OUTPATIENT
Start: 2023-03-31 | End: 2023-03-31 | Stop reason: HOSPADM

## 2023-03-31 RX ORDER — HEPARIN SODIUM (PORCINE) LOCK FLUSH IV SOLN 100 UNIT/ML 100 UNIT/ML
500 SOLUTION INTRAVENOUS AS NEEDED
OUTPATIENT
Start: 2023-03-31

## 2023-03-31 RX ORDER — PROMETHAZINE HYDROCHLORIDE 25 MG/ML
12.5 INJECTION, SOLUTION INTRAMUSCULAR; INTRAVENOUS ONCE
Status: DISCONTINUED | OUTPATIENT
Start: 2023-03-31 | End: 2023-03-31

## 2023-03-31 RX ORDER — SODIUM CHLORIDE 0.9 % (FLUSH) 0.9 %
10 SYRINGE (ML) INJECTION AS NEEDED
OUTPATIENT
Start: 2023-03-31

## 2023-03-31 RX ORDER — DEXTROAMPHETAMINE SACCHARATE, AMPHETAMINE ASPARTATE MONOHYDRATE, DEXTROAMPHETAMINE SULFATE AND AMPHETAMINE SULFATE 5; 5; 5; 5 MG/1; MG/1; MG/1; MG/1
20 CAPSULE, EXTENDED RELEASE ORAL EVERY MORNING
Qty: 30 CAPSULE | Refills: 0 | Status: CANCELLED | OUTPATIENT
Start: 2023-03-31

## 2023-03-31 RX ADMIN — PROMETHAZINE HYDROCHLORIDE 12.5 MG: 25 INJECTION INTRAMUSCULAR; INTRAVENOUS at 14:53

## 2023-03-31 RX ADMIN — DEXAMETHASONE SODIUM PHOSPHATE: 10 INJECTION, SOLUTION INTRAMUSCULAR; INTRAVENOUS at 14:14

## 2023-03-31 RX ADMIN — HEPARIN 500 UNITS: 100 SYRINGE at 15:26

## 2023-03-31 RX ADMIN — PEGFILGRASTIM-CBQV 6 MG: 6 INJECTION, SOLUTION SUBCUTANEOUS at 15:26

## 2023-03-31 RX ADMIN — Medication 10 ML: at 15:26

## 2023-04-03 ENCOUNTER — TELEMEDICINE (OUTPATIENT)
Dept: PSYCHIATRY | Facility: CLINIC | Age: 38
End: 2023-04-03
Payer: COMMERCIAL

## 2023-04-03 DIAGNOSIS — F90.2 ATTENTION DEFICIT HYPERACTIVITY DISORDER, COMBINED TYPE: Primary | ICD-10-CM

## 2023-04-03 DIAGNOSIS — F90.2 ATTENTION DEFICIT HYPERACTIVITY DISORDER, COMBINED TYPE: ICD-10-CM

## 2023-04-03 PROCEDURE — 99214 OFFICE O/P EST MOD 30 MIN: CPT | Performed by: NURSE PRACTITIONER

## 2023-04-03 RX ORDER — DEXTROAMPHETAMINE SACCHARATE, AMPHETAMINE ASPARTATE MONOHYDRATE, DEXTROAMPHETAMINE SULFATE AND AMPHETAMINE SULFATE 5; 5; 5; 5 MG/1; MG/1; MG/1; MG/1
20 CAPSULE, EXTENDED RELEASE ORAL EVERY MORNING
Qty: 30 CAPSULE | Refills: 0 | Status: SHIPPED | OUTPATIENT
Start: 2023-04-03

## 2023-04-03 NOTE — PROGRESS NOTES
"This provider is located at [facility], [address] [place] [address], [facility], [id number] using a secure Achieved[employer]cohart Video Visit through [employer]. Patient is being seen remotely via telehealth at their home address in [place], and stated they are in a secure environment for this session. The patient's condition being diagnosed/treated is appropriate for telemedicine. The provider identified herself as well as her credentials.   The patient, and/or patients guardian, consent to be seen remotely, and when consent is given they understand that the consent allows for patient identifiable information to be sent to a third party as needed.   They may refuse to be seen remotely at any time. The electronic data is encrypted and password protected, and the patient and/or guardian has been advised of the potential risks to privacy not withstanding such measures.   PT Identifiers used: Name and .    You have chosen to receive care through a telehealth visit.  Do you consent to use a video/audio connection for your medical care today? Yes      Subjective   Christie Griffith is a 37 y.o. female who presents today for follow up for medication management    Chief Complaint:  \"ADHD\"     History of Present Illness:    History of Present Illness  Patient reported continued positive results from Adderall XR 20 mg daily.  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  She is a  at an elementary school.  Reports she is much more engaged and focused, she is more organized.  She does not feel as overwhelmed with tasks that she has.   No side effects are reported from medication.   Patient's physical health has been under attack from breast cancer diagnosis.  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  She reports she has an excellent support network with her friends and family and her Gnosticism family.  She reports she is trying to be very positive and using humor to help herself " "as well.  She reports she has named the tumor \"Magda\" and reports that once chemotherapy is finished she hopes [place] will turn into \"toast.\"  She adds that on the days that she is having chemo and the 2 days following chemo that she does not utilize the ADHD medication due to all the side effects of the chemotherapy.  She has lost some weight due to the side effects of the chemotherapy.            Last Menstrual Period:  IUD removed d/t having Breast Cancer and was removed prior to starting treatment in March.     The following portions of the patient's history were reviewed and updated as appropriate: allergies, current medications, past family history, past medical history, past social history, past surgical history and problem list.    Past Psychiatric History:  Patient reports only taking the Wellbutrin, no history of admissions, no history of self harming behaviors, no SI, HI, hallucinations or psychosis.  She reports she did have postpartum depression and managed to work through it but it was very difficult.    Past Medical History:  Past Medical History:   Diagnosis Date   • ADHD (attention deficit hyperactivity disorder) 7-22   • Generalized anxiety disorder    • Malignant neoplasm of upper-outer quadrant of right female breast 02/03/2023   • PONV (postoperative nausea and vomiting)    • Psoriasis    • Varicella 1990       Substance Abuse History:   Types:Denies all, including illicit      Social History:  Social History     Socioeconomic History   • Marital status:      Spouse name: Aurelio   • Number of children: 1   • Highest education level: Master's degree (e.g., MA, MS, Emma, MEd, MSW, JUSTINA)   Tobacco Use   • Smoking status: Never   • Smokeless tobacco: Never   Vaping Use   • Vaping Use: Never used   Substance and Sexual Activity   • Alcohol use: Not Currently   • Drug use: Never   • Sexual activity: Yes     Partners: Male     Birth control/protection: I.U.D., Implant   Patient reports good " support system with family and friends.  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  Reports Holiness belief system.  Denies any history of  service or any legal issues.    Family History:  Family History   Problem Relation Age of Onset   • Lymphoma Mother    • Diabetes Mother    • Heart disease Mother    • Coronary artery disease Father    • Atrial fibrillation Father    • Heart disease Father    • Thyroid disease Other    • Heart murmur Daughter        Past Surgical History:  Past Surgical History:   Procedure Laterality Date   • BILATERAL BREAST REDUCTION  01/2009   • MOUTH SURGERY      x 4 dental implants   • REDUCTION MAMMAPLASTY     • VENOUS ACCESS DEVICE (PORT) INSERTION Left 2/16/2023    Procedure: MEDIPORT PLACEMENT              (C-ARM#2);  Surgeon: Bruce Charles MD;  Location: [facility];  Service: General;  Laterality: Left;   • WISDOM TOOTH EXTRACTION         Problem List:  Patient Active Problem List   Diagnosis   • Mass of upper outer quadrant of right breast   • Malignant neoplasm of upper-outer quadrant of right female breast   • Other long term (current) drug therapy   • Encounter for venous access device care   • CINV (chemotherapy-induced nausea and vomiting)   • Skin rash   • Mucositis due to antineoplastic therapy       Allergy:   Allergies   Allergen Reactions   • Emend [Fosaprepitant] Shortness Of Breath   • Transderm-Scop [Scopolamine] Dizziness        Current Medications:   Current Outpatient Medications   Medication Sig Dispense Refill   • amphetamine-dextroamphetamine XR (Adderall XR) 20 MG 24 hr capsule Take 1 capsule by mouth Every Morning 30 capsule 0   • clindamycin (Clindagel) 1 % gel Apply 1 application topically to the appropriate area as directed 2 (Two) Times a Day. 30 g 0   • famotidine (PEPCID) 20 MG tablet Take 1 tablet by mouth 2 (Two) Times a Day. 120 tablet 0   •  HYDROcodone-acetaminophen (NORCO) 5-325 MG per tablet Take 1 tablet by mouth Every 6 (Six) Hours As Needed for Pain. 10 tablet 0   • Nystatin (magic mouthwash) Swish and spit 10 mL Every 4 (Four) Hours As Needed (mucositis). 202.5 mL 0   • OLANZapine (zyPREXA) 5 MG tablet Take 1 tablet by mouth Every Night. Take on days 2, 3 and 4 after chemotherapy. 3 tablet 3   • ondansetron (ZOFRAN) 8 MG tablet Take 1 tablet by mouth 3 (Three) Times a Day As Needed for Nausea or Vomiting. 30 tablet 5   • promethazine (PHENERGAN) 12.5 MG tablet Take 1 tablet by mouth Every 6 (Six) Hours As Needed for Nausea or Vomiting. 90 tablet 0     No current facility-administered medications for this visit.       Review of Systems:    Review of Systems   Constitutional: Positive for appetite change, fatigue and unexpected weight loss.   Psychiatric/Behavioral: Positive for stress.   All other systems reviewed and are negative.        Physical Exam:   Physical Exam  Vitals reviewed.   HENT:      Head: Normocephalic.   Neurological:      Mental Status: She is alert.   Psychiatric:         Attention and Perception: Attention and perception normal.         Mood and Affect: Mood and affect normal.         Speech: Speech normal.         Behavior: Behavior normal. Behavior is cooperative.         Thought Content: Thought content normal.         Cognition and Memory: Cognition and memory normal.         Judgment: Judgment normal.      Comments:           Vitals:  not currently breastfeeding. There is no height or weight on file to calculate BMI.  Due to extenuating circumstances and possible current health risks associated with the patient being present in a clinical setting (with current health restrictions in place in regards to possible COVID 19 transmission/exposure), the patient was seen remotely today via a MyChart Video Visit through [facility] and telephone encounter.  Unable to obtain vital signs due to nature of remote visit.  Height stated at 67  inches.  Weight stated at 159 pounds.    Last 3 Blood Pressure Readings:  BP Readings from Last 3 Encounters:   03/31/23 141/65   03/30/23 142/88   03/17/23 141/79         Mental Status Exam:   Hygiene:   good  Cooperation:  Cooperative  Eye Contact:  Good  Psychomotor Behavior:  Appropriate  Affect:  Full range  Mood: euthymic  Hopelessness: Denies  Speech:  Normal  Thought Process:  Goal directed and Linear  Thought Content:  Normal  Suicidal:  None  Homicidal:  None  Hallucinations:  None  Delusion:  None  Memory:  Intact  Orientation:  Person, Place, Time and Situation  Reliability:  good  Insight:  Good  Judgement:  Good  Impulse Control:  Good  Physical/Medical Issues:  Yes Patient currently undergoing chemotherapy for breast cancer diagnosis       Lab Results:   Infusion on 03/30/2023   Component Date Value Ref Range Status   • Glucose 03/30/2023 113 (H)  65 - 99 mg/dL Final   • BUN 03/30/2023 14  6 - 20 mg/dL Final   • Creatinine 03/30/2023 0.66  0.57 - 1.00 mg/dL Final   • Sodium 03/30/2023 142  136 - 145 mmol/L Final   • Potassium 03/30/2023 4.3  3.5 - 5.2 mmol/L Final   • Chloride 03/30/2023 106  98 - 107 mmol/L Final   • CO2 03/30/2023 25.0  22.0 - 29.0 mmol/L Final   • Calcium 03/30/2023 9.5  8.6 - 10.5 mg/dL Final   • Total Protein 03/30/2023 6.9  6.0 - 8.5 g/dL Final   • Albumin 03/30/2023 4.3  3.5 - 5.2 g/dL Final   • ALT (SGPT) 03/30/2023 54 (H)  1 - 33 U/L Final   • AST (SGOT) 03/30/2023 40 (H)  1 - 32 U/L Final   • Alkaline Phosphatase 03/30/2023 102  39 - 117 U/L Final   • Total Bilirubin 03/30/2023 0.6  0.0 - 1.2 mg/dL Final   • Globulin 03/30/2023 2.6  gm/dL Final   • A/G Ratio 03/30/2023 1.7  g/dL Final   • BUN/Creatinine Ratio 03/30/2023 21.2  7.0 - 25.0 Final   • Anion Gap 03/30/2023 11.0  5.0 - 15.0 mmol/L Final   • eGFR 03/30/2023 116.0  >60.0 mL/min/1.73 Final   • WBC 03/30/2023 5.51  3.40 - 10.80 10*3/mm3 Final   • RBC 03/30/2023 4.14  3.77 - 5.28 10*6/mm3 Final   • Hemoglobin 03/30/2023  12.7  12.0 - 15.9 g/dL Final   • Hematocrit 03/30/2023 35.7  34.0 - 46.6 % Final   • MCV 03/30/2023 86.2  79.0 - 97.0 fL Final   • MCH 03/30/2023 30.7  26.6 - 33.0 pg Final   • MCHC 03/30/2023 35.6  31.5 - 35.7 g/dL Final   • RDW 03/30/2023 13.5  12.3 - 15.4 % Final   • RDW-SD 03/30/2023 38.2  37.0 - 54.0 fl Final   • MPV 03/30/2023 10.3  6.0 - 12.0 fL Final   • Platelets 03/30/2023 325  140 - 450 10*3/mm3 Final   • Extra Tube 03/30/2023 Hold for add-ons.   Final    Auto resulted.   • Neutrophil % 03/30/2023 55.0  42.7 - 76.0 % Final   • Lymphocyte % 03/30/2023 22.0  19.6 - 45.3 % Final   • Monocyte % 03/30/2023 9.0  5.0 - 12.0 % Final   • Eosinophil % 03/30/2023 1.0  0.3 - 6.2 % Final   • Bands %  03/30/2023 6.0 (H)  0.0 - 5.0 % Final   • Metamyelocyte % 03/30/2023 6.0 (H)  0.0 - 0.0 % Final   • Atypical Lymphocyte % 03/30/2023 1.0  0.0 - 5.0 % Final   • Neutrophils Absolute 03/30/2023 3.36  1.70 - 7.00 10*3/mm3 Final   • Lymphocytes Absolute 03/30/2023 1.27  0.70 - 3.10 10*3/mm3 Final   • Monocytes Absolute 03/30/2023 0.50  0.10 - 0.90 10*3/mm3 Final   • Eosinophils Absolute 03/30/2023 0.06  0.00 - 0.40 10*3/mm3 Final   • Anisocytosis 03/30/2023 Slight/1+  None Seen Final   • WBC Morphology 03/30/2023 Normal  Normal Final   • Platelet Estimate 03/30/2023 Adequate  Normal Final   Infusion on 03/16/2023   Component Date Value Ref Range Status   • Glucose 03/16/2023 109 (H)  65 - 99 mg/dL Final   • BUN 03/16/2023 13  6 - 20 mg/dL Final   • Creatinine 03/16/2023 0.64  0.57 - 1.00 mg/dL Final   • Sodium 03/16/2023 143  136 - 145 mmol/L Final   • Potassium 03/16/2023 3.9  3.5 - 5.2 mmol/L Final   • Chloride 03/16/2023 108 (H)  98 - 107 mmol/L Final   • CO2 03/16/2023 26.0  22.0 - 29.0 mmol/L Final   • Calcium 03/16/2023 9.3  8.6 - 10.5 mg/dL Final   • Total Protein 03/16/2023 7.0  6.0 - 8.5 g/dL Final   • Albumin 03/16/2023 4.5  3.5 - 5.2 g/dL Final   • ALT (SGPT) 03/16/2023 30  1 - 33 U/L Final   • AST (SGOT)  03/16/2023 17  1 - 32 U/L Final   • Alkaline Phosphatase 03/16/2023 97  39 - 117 U/L Final   • Total Bilirubin 03/16/2023 0.4  0.0 - 1.2 mg/dL Final   • Globulin 03/16/2023 2.5  gm/dL Final   • A/G Ratio 03/16/2023 1.8  g/dL Final   • BUN/Creatinine Ratio 03/16/2023 20.3  7.0 - 25.0 Final   • Anion Gap 03/16/2023 9.0  5.0 - 15.0 mmol/L Final   • eGFR 03/16/2023 116.9  >60.0 mL/min/1.73 Final   • WBC 03/16/2023 6.02  3.40 - 10.80 10*3/mm3 Final   • RBC 03/16/2023 4.17  3.77 - 5.28 10*6/mm3 Final   • Hemoglobin 03/16/2023 12.6  12.0 - 15.9 g/dL Final   • Hematocrit 03/16/2023 35.8  34.0 - 46.6 % Final   • MCV 03/16/2023 85.9  79.0 - 97.0 fL Final   • MCH 03/16/2023 30.2  26.6 - 33.0 pg Final   • MCHC 03/16/2023 35.2  31.5 - 35.7 g/dL Final   • RDW 03/16/2023 12.3  12.3 - 15.4 % Final   • RDW-SD 03/16/2023 37.1  37.0 - 54.0 fl Final   • MPV 03/16/2023 10.3  6.0 - 12.0 fL Final   • Platelets 03/16/2023 301  140 - 450 10*3/mm3 Final   • Extra Tube 03/16/2023 Hold for add-ons.   Final    Auto resulted.   • Neutrophil % 03/16/2023 44.0  42.7 - 76.0 % Final   • Lymphocyte % 03/16/2023 44.0  19.6 - 45.3 % Final   • Monocyte % 03/16/2023 7.0  5.0 - 12.0 % Final   • Eosinophil % 03/16/2023 2.0  0.3 - 6.2 % Final   • Bands %  03/16/2023 3.0  0.0 - 5.0 % Final   • Neutrophils Absolute 03/16/2023 2.83  1.70 - 7.00 10*3/mm3 Final   • Lymphocytes Absolute 03/16/2023 2.65  0.70 - 3.10 10*3/mm3 Final   • Monocytes Absolute 03/16/2023 0.42  0.10 - 0.90 10*3/mm3 Final   • Eosinophils Absolute 03/16/2023 0.12  0.00 - 0.40 10*3/mm3 Final   • RBC Morphology 03/16/2023 Normal  Normal Final   • WBC Morphology 03/16/2023 Normal  Normal Final   • Platelet Estimate 03/16/2023 Adequate  Normal Final       Assessment & Plan   Diagnoses and all orders for this visit:    1. Attention deficit hyperactivity disorder, combined type (Primary)        Visit Diagnoses:    ICD-10-CM ICD-9-CM   1. Attention deficit hyperactivity disorder, combined type   "F90.2 314.01         GOALS:  Short Term Goals: Patient will be compliant with medication, and patient will have no significant medication related side effects.  Patient will be engaged in psychotherapy as indicated.  Patient will report subjective improvement of symptoms.  Long term goals: To stabilize mood and treat/improve subjective symptoms, the patient will stay out of the hospital, the patient will be at an optimal level of functioning, and the patient will take all medications as prescribed.  The patient/guardian verbalized understanding and agreement with goals that were mutually set.    RISK ASSESSMENT  Current harm-to-self risk is reported by pt as \"none.\"  Current [id number] risk is reported by pt as \"none.\"   No suicidal thoughts, intent, plan is appreciated by this provider on this date of exam.   No homicidal thoughts, intent, plan is appreciated by this provider on this date.    TREATMENT PLAN: Continue supportive psychotherapy efforts and medications as indicated.   Pharmacological and Non-Pharmacological treatment options discussed during today's visit. Patient/Guardian acknowledged and verbally consented with current treatment plan and was educated on the importance of compliance with treatment and follow-up appointments.      MEDICATION ISSUES:  Discussed medication options and treatment plan of prescribed medication as well as the risks, benefits, any black box warnings, and side effects including potential falls, possible impaired driving, and metabolic adversities among others. Patient is agreeable to call the office with any worsening of symptoms or onset of side effects, or if any concerns or questions arise.  The contact information for the office is made available to the patient. Patient is agreeable to call 911 or go to the nearest ER should they begin having any SI/HI, or if any urgent concerns arise. No medication side effects or related complaints today.     Continue with Adderall " XR 20 mg every morning.  Follow up in 3 months.  Patient is educated to call 1 week before she is due for refill to ensure provider has enough time to send in this medication.    Discussed with the patient that the Biden Administration announced on January 30, 2023 that the national and public health emergencies (PHE) will end on May 11, 2023.  Discussed with the patient that during the PHE a portion of the [place] Act was waived, allowing controlled substances to be provided via telemedicine without in person encounters.  Discussed with the patient that with the PHE ending the [place] Act will go back into full effect, meaning that prescriptions for controlled substances can not be given via telemedicine without in person encounters and meeting all requirements of the [place] Act.  Discussed with the patient that the [facility] is strictly a telemedicine clinic and cannot offer the patient an in-person evaluation to be compliant with the [place] Act as it goes back into effect.  Discussed with the patient unless there are legislative changes prior to the ending of the PHE on May 11, 2023, the patient will no longer be able to obtain prescription(s) for controlled substance from this APRN/the [facility] and they will need to establish care with a local provider in their area for an in-person evaluation and treatment with any controlled substances.  The patient verbalizes understanding in their own words.    MEDS ORDERED DURING VISIT:  No orders of the defined types were placed in this encounter.  Refill sent earlier.    Follow Up Appointment:   Return in about 3 months (around 7/3/2023) for Recheck, Video visit.               This document has been electronically signed by MELONY Reich  April 4, 2023 07:06 EDT    Please note that portions of this note were completed with a voice recognition program.  Efforts were made to edit dictation, but occasionally words are mistranscribed.

## 2023-04-04 NOTE — PATIENT INSTRUCTIONS
For concerns or needing assistance call the [facility] at [phone]

## 2023-04-07 NOTE — PROGRESS NOTES
Subjective     Christie Griffith was seen in follow up for breast cancer.   Since her last visit she has been struggling with runny nose and sinus drainage.  No fever or chills.  No sore throat.  No cough.  No shortness of breath.  Intermittent nausea which responded to antinausea medication.    Past Medical History, Past Surgical History, Social History, Family History have been reviewed and are without significant changes except as mentioned.        Medications:  The current medication list was reviewed in the EMR    ALLERGIES:    Allergies   Allergen Reactions   • Emend [Fosaprepitant] Shortness Of Breath   • Transderm-Scop [Scopolamine] Dizziness       Objective      Vitals:    04/13/23 0817   BP: 140/90   Pulse: 91   Resp: 18   Temp: 97.2 °F (36.2 °C)   SpO2: 100%         Current Status 3/31/2023   ECOG score 0       Physical Exam  Vitals and nursing note reviewed.   Constitutional:       Appearance: Normal appearance.   Neurological:      General: No focal deficit present.      Mental Status: She is alert and oriented to person, place, and time. Mental status is at baseline.   Psychiatric:         Mood and Affect: Mood normal.         Behavior: Behavior normal.         Thought Content: Thought content normal.               RECENT LABS:Independently reviewed and summarized  Hematology WBC   Date Value Ref Range Status   03/30/2023 5.51 3.40 - 10.80 10*3/mm3 Final     RBC   Date Value Ref Range Status   03/30/2023 4.14 3.77 - 5.28 10*6/mm3 Final     Hemoglobin   Date Value Ref Range Status   03/30/2023 12.7 12.0 - 15.9 g/dL Final     Hematocrit   Date Value Ref Range Status   03/30/2023 35.7 34.0 - 46.6 % Final     Platelets   Date Value Ref Range Status   03/30/2023 325 140 - 450 10*3/mm3 Final     WBC   Date Value Ref Range Status   04/12/2023   Final     Comment:     See scanned report       RBC   Date Value Ref Range Status   04/12/2023   Final     Comment:     See scanned report       Hemoglobin   Date  Value Ref Range Status   04/12/2023   Final     Comment:     See scanned report       Hematocrit   Date Value Ref Range Status   04/12/2023   Final     Comment:     See scanned report       MCV   Date Value Ref Range Status   04/12/2023   Final     Comment:     See scanned report       MCH   Date Value Ref Range Status   04/12/2023   Final     Comment:     See scanned report       MCHC   Date Value Ref Range Status   04/12/2023   Final     Comment:     See scanned report       RDW   Date Value Ref Range Status   04/12/2023   Final     Comment:     See scanned report       RDW-SD   Date Value Ref Range Status   03/30/2023 38.2 37.0 - 54.0 fl Final     MPV   Date Value Ref Range Status   03/30/2023 10.3 6.0 - 12.0 fL Final     Platelets   Date Value Ref Range Status   04/12/2023   Final     Comment:     See scanned report       Neutrophil %   Date Value Ref Range Status   03/02/2023 64.7 42.7 - 76.0 % Final     Lymphocyte %   Date Value Ref Range Status   03/02/2023 26.1 19.6 - 45.3 % Final     Monocyte %   Date Value Ref Range Status   03/02/2023 7.3 5.0 - 12.0 % Final     Eosinophil %   Date Value Ref Range Status   03/02/2023 0.6 0.3 - 6.2 % Final     Basophil %   Date Value Ref Range Status   03/02/2023 1.0 0.0 - 1.5 % Final     Immature Grans %   Date Value Ref Range Status   03/02/2023 0.3 0.0 - 0.5 % Final     Neutrophils Absolute   Date Value Ref Range Status   03/30/2023 3.36 1.70 - 7.00 10*3/mm3 Final     Neutrophils, Absolute   Date Value Ref Range Status   03/02/2023 4.06 1.70 - 7.00 10*3/mm3 Final     Lymphocytes, Absolute   Date Value Ref Range Status   03/02/2023 1.64 0.70 - 3.10 10*3/mm3 Final     Monocytes, Absolute   Date Value Ref Range Status   03/02/2023 0.46 0.10 - 0.90 10*3/mm3 Final     Eosinophils Absolute   Date Value Ref Range Status   03/30/2023 0.06 0.00 - 0.40 10*3/mm3 Final     Eosinophils, Absolute   Date Value Ref Range Status   03/02/2023 0.04 0.00 - 0.40 10*3/mm3 Final     Basophils,  Absolute   Date Value Ref Range Status   03/02/2023 0.06 0.00 - 0.20 10*3/mm3 Final     Immature Grans, Absolute   Date Value Ref Range Status   03/02/2023 0.02 0.00 - 0.05 10*3/mm3 Final     nRBC   Date Value Ref Range Status   03/02/2023 0.0 0.0 - 0.2 /100 WBC Final     Lab Results   Component Value Date    GLUCOSE 125 (H) 04/12/2023    BUN 10 04/12/2023    CREATININE 0.64 04/12/2023    EGFR 116.2 04/12/2023    BCR 15.6 04/12/2023    K 3.7 04/12/2023    CO2 28.0 04/12/2023    CALCIUM 9.3 04/12/2023    ALBUMIN 4.3 04/12/2023    BILITOT 0.5 04/12/2023    AST 22 04/12/2023    ALT 39 (H) 04/12/2023              Christie BARNES Gladis reports a pain score of 0.  Given her pain assessment as noted, treatment options were discussed and the following options were decided upon as a follow-up plan to address the patient's pain: continuation of current treatment plan for pain.    Patient screened negative for depression based on a PHQ-9 score of 0 on 4/13/2023.     Diagnosis:     (1) Right breast cancer   Stage IIB (cT2, cN0, cM0, G3)      ER 20% IL 1%   Poorly differentiated      Current therapy:   Dose dense AC   Cycle 1: 3/2/23   Cycle 2: 3/16/23   Cycle 3: 3/20/23   Cycle 4: 4/13/23      (2) CINV   (3) Skin rash   (4) Mucositis       Assessment & Plan     (1) Right breast cancer     Chronic, stable.   Currently on neoadjuvant chemotherapy.   Breast mass appears to have resolved clinically.   Labs look stable.   Cycle 4 dose dene AC with neulasta growth factor was signed on today's visit.   I will see her back in 2 weeks with CBC, CMP.   I will plan for dose dense taxol with neulasta growth factor in 2 weeks.  Side effects associated with dose dense paclitaxel discussed at length.  CBC, CMP in 2 weeks prior to paclitaxel        (2) CINV     Patient struggling with delayed CINV.   She was allergic to Emend.   Phenergan was added to zofran and zyprexa to prevent CINV.   I will have her come in tomorrow for IV dexamethasone, IV  Zofran and IV Phenergan.    (3) Skin rash     Chronic, stable.  Acneform rash involving face.  This is improved with clindamycin ointment.  She will continue this.    (4) Mucositis     Chronic, stable.  Mucositis from chemotherapy.  Continue Magic mouthwash as needed.      4/7/2023      CC:

## 2023-04-12 ENCOUNTER — INFUSION (OUTPATIENT)
Dept: ONCOLOGY | Facility: HOSPITAL | Age: 38
End: 2023-04-12
Payer: COMMERCIAL

## 2023-04-12 DIAGNOSIS — C50.411 MALIGNANT NEOPLASM OF UPPER-OUTER QUADRANT OF RIGHT BREAST IN FEMALE, ESTROGEN RECEPTOR POSITIVE: Primary | ICD-10-CM

## 2023-04-12 DIAGNOSIS — Z17.0 MALIGNANT NEOPLASM OF UPPER-OUTER QUADRANT OF RIGHT BREAST IN FEMALE, ESTROGEN RECEPTOR POSITIVE: Primary | ICD-10-CM

## 2023-04-12 DIAGNOSIS — Z45.2 ENCOUNTER FOR VENOUS ACCESS DEVICE CARE: ICD-10-CM

## 2023-04-12 LAB
ALBUMIN SERPL-MCNC: 4.3 G/DL (ref 3.5–5.2)
ALBUMIN/GLOB SERPL: 1.8 G/DL
ALP SERPL-CCNC: 134 U/L (ref 39–117)
ALT SERPL W P-5'-P-CCNC: 39 U/L (ref 1–33)
ANION GAP SERPL CALCULATED.3IONS-SCNC: 9 MMOL/L (ref 5–15)
AST SERPL-CCNC: 22 U/L (ref 1–32)
BILIRUB SERPL-MCNC: 0.5 MG/DL (ref 0–1.2)
BUN SERPL-MCNC: 10 MG/DL (ref 6–20)
BUN/CREAT SERPL: 15.6 (ref 7–25)
CALCIUM SPEC-SCNC: 9.3 MG/DL (ref 8.6–10.5)
CHLORIDE SERPL-SCNC: 105 MMOL/L (ref 98–107)
CO2 SERPL-SCNC: 28 MMOL/L (ref 22–29)
CREAT SERPL-MCNC: 0.64 MG/DL (ref 0.57–1)
EGFRCR SERPLBLD CKD-EPI 2021: 116.2 ML/MIN/1.73
GLOBULIN UR ELPH-MCNC: 2.4 GM/DL
GLUCOSE SERPL-MCNC: 125 MG/DL (ref 65–99)
POTASSIUM SERPL-SCNC: 3.7 MMOL/L (ref 3.5–5.2)
PROT SERPL-MCNC: 6.7 G/DL (ref 6–8.5)
SODIUM SERPL-SCNC: 142 MMOL/L (ref 136–145)

## 2023-04-12 PROCEDURE — 85025 COMPLETE CBC W/AUTO DIFF WBC: CPT

## 2023-04-12 PROCEDURE — 36591 DRAW BLOOD OFF VENOUS DEVICE: CPT | Performed by: INTERNAL MEDICINE

## 2023-04-12 PROCEDURE — 80053 COMPREHEN METABOLIC PANEL: CPT

## 2023-04-12 PROCEDURE — 25010000002 HEPARIN LOCK FLUSH PER 10 UNITS: Performed by: INTERNAL MEDICINE

## 2023-04-12 RX ORDER — SODIUM CHLORIDE 0.9 % (FLUSH) 0.9 %
10 SYRINGE (ML) INJECTION AS NEEDED
Status: CANCELLED | OUTPATIENT
Start: 2023-04-12

## 2023-04-12 RX ORDER — HEPARIN SODIUM (PORCINE) LOCK FLUSH IV SOLN 100 UNIT/ML 100 UNIT/ML
500 SOLUTION INTRAVENOUS AS NEEDED
Status: CANCELLED | OUTPATIENT
Start: 2023-04-12

## 2023-04-12 RX ORDER — HEPARIN SODIUM (PORCINE) LOCK FLUSH IV SOLN 100 UNIT/ML 100 UNIT/ML
500 SOLUTION INTRAVENOUS AS NEEDED
Status: DISCONTINUED | OUTPATIENT
Start: 2023-04-12 | End: 2023-04-12 | Stop reason: HOSPADM

## 2023-04-12 RX ADMIN — HEPARIN 500 UNITS: 100 SYRINGE at 15:10

## 2023-04-13 ENCOUNTER — DOCUMENTATION (OUTPATIENT)
Dept: ONCOLOGY | Facility: CLINIC | Age: 38
End: 2023-04-13
Payer: COMMERCIAL

## 2023-04-13 ENCOUNTER — INFUSION (OUTPATIENT)
Dept: ONCOLOGY | Facility: HOSPITAL | Age: 38
End: 2023-04-13
Payer: COMMERCIAL

## 2023-04-13 ENCOUNTER — OFFICE VISIT (OUTPATIENT)
Dept: ONCOLOGY | Facility: CLINIC | Age: 38
End: 2023-04-13
Payer: COMMERCIAL

## 2023-04-13 VITALS
DIASTOLIC BLOOD PRESSURE: 90 MMHG | OXYGEN SATURATION: 100 % | BODY MASS INDEX: 25.06 KG/M2 | WEIGHT: 160 LBS | HEART RATE: 91 BPM | SYSTOLIC BLOOD PRESSURE: 140 MMHG | RESPIRATION RATE: 18 BRPM | TEMPERATURE: 97.2 F

## 2023-04-13 DIAGNOSIS — T45.1X5A CINV (CHEMOTHERAPY-INDUCED NAUSEA AND VOMITING): ICD-10-CM

## 2023-04-13 DIAGNOSIS — C50.411 MALIGNANT NEOPLASM OF UPPER-OUTER QUADRANT OF RIGHT BREAST IN FEMALE, ESTROGEN RECEPTOR POSITIVE: Primary | ICD-10-CM

## 2023-04-13 DIAGNOSIS — Z17.0 MALIGNANT NEOPLASM OF UPPER-OUTER QUADRANT OF RIGHT BREAST IN FEMALE, ESTROGEN RECEPTOR POSITIVE: Primary | ICD-10-CM

## 2023-04-13 DIAGNOSIS — Z79.899 OTHER LONG TERM (CURRENT) DRUG THERAPY: ICD-10-CM

## 2023-04-13 DIAGNOSIS — Z45.2 ENCOUNTER FOR VENOUS ACCESS DEVICE CARE: ICD-10-CM

## 2023-04-13 DIAGNOSIS — K12.31 MUCOSITIS DUE TO ANTINEOPLASTIC THERAPY: ICD-10-CM

## 2023-04-13 DIAGNOSIS — R11.2 CINV (CHEMOTHERAPY-INDUCED NAUSEA AND VOMITING): ICD-10-CM

## 2023-04-13 LAB
BASOPHILS # BLD AUTO: NORMAL 10*3/UL
BASOPHILS NFR BLD AUTO: NORMAL %
EOSINOPHIL # BLD AUTO: NORMAL 10*3/UL
EOSINOPHIL NFR BLD AUTO: NORMAL %
ERYTHROCYTE [DISTWIDTH] IN BLOOD BY AUTOMATED COUNT: NORMAL %
HCT VFR BLD AUTO: NORMAL %
HGB BLD-MCNC: NORMAL G/DL
LYMPHOCYTES # BLD AUTO: NORMAL 10*3/UL
LYMPHOCYTES NFR BLD AUTO: NORMAL %
MCH RBC QN AUTO: NORMAL PG
MCHC RBC AUTO-ENTMCNC: NORMAL G/DL
MCV RBC AUTO: NORMAL FL
MONOCYTES # BLD AUTO: NORMAL 10*3/UL
MONOCYTES NFR BLD AUTO: NORMAL %
NEUTROPHILS NFR BLD AUTO: NORMAL %
NEUTROPHILS NFR BLD AUTO: NORMAL %
PLATELET # BLD AUTO: NORMAL 10*3/UL
RBC # BLD AUTO: NORMAL 10*6/UL
WBC NRBC COR # BLD: NORMAL 10*3/UL

## 2023-04-13 PROCEDURE — 25010000002 HEPARIN LOCK FLUSH PER 10 UNITS: Performed by: INTERNAL MEDICINE

## 2023-04-13 PROCEDURE — 25010000002 DEXAMETHASONE SODIUM PHOSPHATE 100 MG/10ML SOLUTION: Performed by: INTERNAL MEDICINE

## 2023-04-13 PROCEDURE — 25010000002 CYCLOPHOSPHAMIDE 1 GM/5ML SOLUTION 5 ML VIAL: Performed by: INTERNAL MEDICINE

## 2023-04-13 PROCEDURE — 25010000002 DOXORUBICIN PER 10 MG: Performed by: INTERNAL MEDICINE

## 2023-04-13 PROCEDURE — 96375 TX/PRO/DX INJ NEW DRUG ADDON: CPT | Performed by: INTERNAL MEDICINE

## 2023-04-13 PROCEDURE — 25010000002 PALONOSETRON PER 25 MCG: Performed by: INTERNAL MEDICINE

## 2023-04-13 PROCEDURE — 96411 CHEMO IV PUSH ADDL DRUG: CPT | Performed by: INTERNAL MEDICINE

## 2023-04-13 PROCEDURE — 96413 CHEMO IV INFUSION 1 HR: CPT | Performed by: INTERNAL MEDICINE

## 2023-04-13 RX ORDER — SODIUM CHLORIDE 9 MG/ML
250 INJECTION, SOLUTION INTRAVENOUS ONCE
Status: COMPLETED | OUTPATIENT
Start: 2023-04-13 | End: 2023-04-13

## 2023-04-13 RX ORDER — PALONOSETRON 0.05 MG/ML
0.25 INJECTION, SOLUTION INTRAVENOUS ONCE
Status: CANCELLED | OUTPATIENT
Start: 2023-04-13

## 2023-04-13 RX ORDER — DOXORUBICIN HYDROCHLORIDE 2 MG/ML
60 INJECTION, SOLUTION INTRAVENOUS ONCE
Status: CANCELLED | OUTPATIENT
Start: 2023-04-13

## 2023-04-13 RX ORDER — HEPARIN SODIUM (PORCINE) LOCK FLUSH IV SOLN 100 UNIT/ML 100 UNIT/ML
500 SOLUTION INTRAVENOUS AS NEEDED
Status: DISCONTINUED | OUTPATIENT
Start: 2023-04-13 | End: 2023-04-13 | Stop reason: HOSPADM

## 2023-04-13 RX ORDER — SODIUM CHLORIDE 0.9 % (FLUSH) 0.9 %
10 SYRINGE (ML) INJECTION AS NEEDED
Status: DISCONTINUED | OUTPATIENT
Start: 2023-04-13 | End: 2023-04-13 | Stop reason: HOSPADM

## 2023-04-13 RX ORDER — PROMETHAZINE HYDROCHLORIDE 25 MG/ML
12.5 INJECTION, SOLUTION INTRAMUSCULAR; INTRAVENOUS ONCE
Status: CANCELLED
Start: 2023-04-14 | End: 2023-04-14

## 2023-04-13 RX ORDER — OLANZAPINE 5 MG/1
5 TABLET ORAL ONCE
Status: COMPLETED | OUTPATIENT
Start: 2023-04-13 | End: 2023-04-13

## 2023-04-13 RX ORDER — SODIUM CHLORIDE 0.9 % (FLUSH) 0.9 %
10 SYRINGE (ML) INJECTION AS NEEDED
Status: CANCELLED | OUTPATIENT
Start: 2023-04-13

## 2023-04-13 RX ORDER — HEPARIN SODIUM (PORCINE) LOCK FLUSH IV SOLN 100 UNIT/ML 100 UNIT/ML
500 SOLUTION INTRAVENOUS AS NEEDED
Status: CANCELLED | OUTPATIENT
Start: 2023-04-13

## 2023-04-13 RX ORDER — DOXORUBICIN HYDROCHLORIDE 2 MG/ML
60 INJECTION, SOLUTION INTRAVENOUS ONCE
Status: COMPLETED | OUTPATIENT
Start: 2023-04-13 | End: 2023-04-13

## 2023-04-13 RX ORDER — OLANZAPINE 5 MG/1
5 TABLET ORAL ONCE
Status: CANCELLED | OUTPATIENT
Start: 2023-04-13 | End: 2023-04-13

## 2023-04-13 RX ORDER — PALONOSETRON 0.05 MG/ML
0.25 INJECTION, SOLUTION INTRAVENOUS ONCE
Status: COMPLETED | OUTPATIENT
Start: 2023-04-13 | End: 2023-04-13

## 2023-04-13 RX ORDER — SODIUM CHLORIDE 9 MG/ML
250 INJECTION, SOLUTION INTRAVENOUS ONCE
Status: CANCELLED | OUTPATIENT
Start: 2023-04-13

## 2023-04-13 RX ADMIN — Medication 10 ML: at 11:58

## 2023-04-13 RX ADMIN — CYCLOPHOSPHAMIDE 1100 MG: 200 INJECTION, SOLUTION INTRAVENOUS at 11:09

## 2023-04-13 RX ADMIN — HEPARIN 500 UNITS: 100 SYRINGE at 11:58

## 2023-04-13 RX ADMIN — SODIUM CHLORIDE 250 ML: 9 INJECTION, SOLUTION INTRAVENOUS at 10:13

## 2023-04-13 RX ADMIN — DEXAMETHASONE SODIUM PHOSPHATE 12 MG: 10 INJECTION, SOLUTION INTRAMUSCULAR; INTRAVENOUS at 10:19

## 2023-04-13 RX ADMIN — DOXORUBICIN HYDROCHLORIDE 110 MG: 2 INJECTION, SOLUTION INTRAVENOUS at 10:52

## 2023-04-13 RX ADMIN — PALONOSETRON 0.25 MG: 0.05 INJECTION, SOLUTION INTRAVENOUS at 10:14

## 2023-04-13 RX ADMIN — OLANZAPINE 5 MG: 5 TABLET, FILM COATED ORAL at 10:14

## 2023-04-13 NOTE — PROGRESS NOTES
4/13/2023    CHEMOTHERAPY PREPARATION    Christie Griffith  [id number]  1985    Chief Complaint: chemo education     History of present illness:  Christie Griffith is a 38 y.o. year old female who is here today for chemotherapy preparation and needs assessment. The patient has been diagnosed with breast cancer; she is completing her iglesia adjuvant chemotherapy A/C and is set to begin DD Paclitaxel in 2 weeks.     Oncology History:    Oncology/Hematology History   Malignant neoplasm of upper-outer quadrant of right female breast   2/3/2023 Initial Diagnosis    Malignant neoplasm of upper-outer quadrant of right female breast (HCC)     2/22/2023 Cancer Staged    Staging form: Breast, AJCC 8th Edition  - Clinical stage from 2/22/2023: Stage IIB (cT2, cN0, cM0, G3, ER+, CT-, HER2-) - Signed by Ryan Meeks MD on 2/22/2023     3/2/2023 -  Chemotherapy    OP BREAST AC DD DOXOrubicin / Cyclophosphamide     4/27/2023 Biopsy    OP BREAST DD PACLitaxel  Plan Provider: Ryan Meeks MD  Treatment goal: Curative  Line of treatment: Neoadjuvant         Past Medical History:   Diagnosis Date   • ADHD (attention deficit hyperactivity disorder) 7-22   • Generalized anxiety disorder    • Malignant neoplasm of upper-outer quadrant of right female breast 02/03/2023   • PONV (postoperative nausea and vomiting)    • Psoriasis    • Varicella 1990       Past Surgical History:   Procedure Laterality Date   • BILATERAL BREAST REDUCTION  01/2009   • MOUTH SURGERY      x 4 dental implants   • REDUCTION MAMMAPLASTY     • VENOUS ACCESS DEVICE (PORT) INSERTION Left 2/16/2023    Procedure: MEDIPORT PLACEMENT              (C-ARM#2);  Surgeon: Bruce Charles MD;  Location: [facility];  Service: General;  Laterality: Left;   • WISDOM TOOTH EXTRACTION         MEDICATIONS: The current medication list was reviewed and reconciled.     Allergies:  is allergic to emend [fosaprepitant] and transderm-scop [scopolamine].    Family  History   Problem Relation Age of Onset   • Lymphoma Mother    • Diabetes Mother    • Heart disease Mother    • Coronary artery disease Father    • Atrial fibrillation Father    • Heart disease Father    • Thyroid disease Other    • Heart murmur Daughter          Review of Systems    Physical Exam  Vital Signs: There were no vitals taken for this visit.   General Appearance:  alert, cooperative, no apparent distress, appears stated age and normal weight   Neurologic/Psychiatric: A&O x 3, gait steady, appropriate affect   HEENT:  Normocephalic, without obvious abnormality, mucous membranes moist   Lungs:   Clear to auscultation bilaterally; respirations regular, even, and unlabored bilaterally   Heart:  Regular rate and rhythm, no murmurs appreciated   Extremities: Normal, atraumatic; no clubbing, cyanosis, or edema    Skin: No rashes, lesions, or abnormal coloration noted     ECOG Performance Status: (0) Fully Active - Able to Carry On All Pre-disease Performance Without Restriction          NEEDS ASSESSMENTS    Genetics  Patient has already completed genetic counseling/testing.    Psychosocial  The patient has completed a PHQ-2 Depression Screening today.   PHQ-2 results show 0 (No Depression).  Copies of patient's questionnaires will be scanned into EMR for details and further reference.    VAD Assessment  The patient has medi port placement.     Additional Referral needs  none      CHEMOTHERAPY EDUCATION    Booklets Given: Chemo cares education sheet on Paclitaxel.  New consent form signed today.     Chemotherapy/Biotherapy Education Sheets: (list all that apply)  nausea management, acid reflux management, diarrhea management, Cancer resourse contacts information and skin and mouth care                                                                                                                                                                 Chemotherapy Regimen:   Treatment Plans     Name Type Plan Dates Plan  Provider         Active    OP BREAST AC DD DOXOrubicin / Cyclophosphamide ONCOLOGY TREATMENT  2/22/2023 - Present Ryan Meeks MD     OP BREAST DD PACLitaxel ONCOLOGY TREATMENT 2  4/26/2023 - Present Ryan Meeks MD                    TOPICS EDUCATION PROVIDED COMMENTS   ANEMIA:  role of RBC, cause, s/s, ways to manage, role of transfusion [x]    THROMBOCYTOPENIA:  role of platelet, cause, s/s, ways to prevent bleeding, things to avoid, when to seek help [x]    NEUTROPENIA:  role of WBC, cause, infection precautions, s/s of infection, when to call MD [x]    NUTRITION & APPETITE CHANGES:  importance of maintaining healthy diet & weight, ways to manage to improve intake, dietary consult, exercise regimen [x]    DIARRHEA:  causes, s/s of dehydration, ways to manage, dietary changes, when to call MD [x]    CONSTIPATION:  causes, ways to manage, dietary changes, when to call MD [x]    NAUSEA & VOMITING:  cause, use of antiemetics, dietary changes, when to call MD [x]    MOUTH SORES:  causes, oral care, ways to manage [x]    ALOPECIA:  cause, ways to manage, resources [x]    INFERTILITY & SEXUALITY:  causes, fertility preservation options, sexuality changes, ways to manage, importance of birth control [x]    NERVOUS SYSTEM CHANGES:  causes, s/s, neuropathies, cognitive changes, ways to manage [x] Educated on potential peripheral neuropathy; pt will be using cryotherapy gloves.   PAIN:  causes, ways to manage [x] ????   SKIN & NAIL CHANGES:  cause, s/s, ways to manage [x]    ORGAN TOXICITIES:  cause, s/s, need for diagnostic tests, labs, when to notify MD [x]    SURVIVORSHIP:  distress, distress assessment, secondary malignancies, early/late effects, follow-up, social issues, social support [x]    HOME CARE:  use of spill kits, storing of PO chemo, how to manage bodily fluids [x]    MISCELLANEOUS:  drug interactions, administration, vesicant, et [x]        Assessment and Plan:    Diagnoses and all  orders for this visit:    1. Malignant neoplasm of upper-outer quadrant of right breast in female, estrogen receptor positive (Primary)        This was a 20 minute face-to-face visit with 20 minutes spent in  counseling and coordination of care as documented above.   The patient and I have reviewed their new cancer diagnosis and scheduled treatment plan. Needs assessment was completed including genetics, psychosocial needs, barriers to care, VAD evaluation, advanced care planning, and palliative care services. Referrals have been ordered as appropriate based upon our evaluation and patient desires.     Chemotherapy teaching was also completed today as documented above. Adequate time was given to answer all questions to her satisfaction. Patient and family are aware of their care team members and contact information if they have questions or problems throughout the treatment course. Needs assessments and education has been completed. The patient is adequately prepared to begin treatment as scheduled.       Yasmin Han, APRN

## 2023-04-13 NOTE — PROGRESS NOTES
Oncology SW met with pt briefly as she presents for treatment.  Pt presents with bright affect and indicates positive support and adaptive coping.   Pt. Shared experiences, thoughts and feelings and was openly supported from a person centered strengths perspective.  SW reinforced ongoing support and availability.  Pt. Responded receptive to encounter.

## 2023-04-14 ENCOUNTER — INFUSION (OUTPATIENT)
Dept: ONCOLOGY | Facility: HOSPITAL | Age: 38
End: 2023-04-14
Payer: COMMERCIAL

## 2023-04-14 VITALS
TEMPERATURE: 97.9 F | HEART RATE: 91 BPM | RESPIRATION RATE: 18 BRPM | DIASTOLIC BLOOD PRESSURE: 72 MMHG | SYSTOLIC BLOOD PRESSURE: 132 MMHG

## 2023-04-14 DIAGNOSIS — R11.2 CINV (CHEMOTHERAPY-INDUCED NAUSEA AND VOMITING): ICD-10-CM

## 2023-04-14 DIAGNOSIS — C50.411 MALIGNANT NEOPLASM OF UPPER-OUTER QUADRANT OF RIGHT BREAST IN FEMALE, ESTROGEN RECEPTOR POSITIVE: Primary | ICD-10-CM

## 2023-04-14 DIAGNOSIS — Z45.2 ENCOUNTER FOR VENOUS ACCESS DEVICE CARE: ICD-10-CM

## 2023-04-14 DIAGNOSIS — Z17.0 MALIGNANT NEOPLASM OF UPPER-OUTER QUADRANT OF RIGHT BREAST IN FEMALE, ESTROGEN RECEPTOR POSITIVE: Primary | ICD-10-CM

## 2023-04-14 DIAGNOSIS — T45.1X5A CINV (CHEMOTHERAPY-INDUCED NAUSEA AND VOMITING): ICD-10-CM

## 2023-04-14 PROCEDURE — 25010000002 DEXAMETHASONE SODIUM PHOSPHATE 100 MG/10ML SOLUTION 10 ML VIAL: Performed by: INTERNAL MEDICINE

## 2023-04-14 PROCEDURE — 25010000002 PEGFILGRASTIM-CBQV 6 MG/0.6ML SOLUTION PREFILLED SYRINGE: Performed by: INTERNAL MEDICINE

## 2023-04-14 PROCEDURE — 25010000002 HEPARIN LOCK FLUSH PER 10 UNITS: Performed by: INTERNAL MEDICINE

## 2023-04-14 PROCEDURE — 25010000002 ONDANSETRON PER 1 MG: Performed by: INTERNAL MEDICINE

## 2023-04-14 PROCEDURE — 25010000002 PROMETHAZINE PER 50 MG: Performed by: NURSE PRACTITIONER

## 2023-04-14 RX ORDER — HEPARIN SODIUM (PORCINE) LOCK FLUSH IV SOLN 100 UNIT/ML 100 UNIT/ML
500 SOLUTION INTRAVENOUS AS NEEDED
OUTPATIENT
Start: 2023-04-14

## 2023-04-14 RX ORDER — PROMETHAZINE HYDROCHLORIDE 25 MG/ML
12.5 INJECTION, SOLUTION INTRAMUSCULAR; INTRAVENOUS ONCE
Status: CANCELLED
Start: 2023-04-15 | End: 2023-04-15

## 2023-04-14 RX ORDER — SODIUM CHLORIDE 0.9 % (FLUSH) 0.9 %
10 SYRINGE (ML) INJECTION AS NEEDED
Status: DISCONTINUED | OUTPATIENT
Start: 2023-04-14 | End: 2023-04-14 | Stop reason: HOSPADM

## 2023-04-14 RX ORDER — SODIUM CHLORIDE 0.9 % (FLUSH) 0.9 %
10 SYRINGE (ML) INJECTION AS NEEDED
OUTPATIENT
Start: 2023-04-14

## 2023-04-14 RX ORDER — PROMETHAZINE HYDROCHLORIDE 25 MG/ML
12.5 INJECTION, SOLUTION INTRAMUSCULAR; INTRAVENOUS ONCE
Status: DISCONTINUED | OUTPATIENT
Start: 2023-04-15 | End: 2023-04-14

## 2023-04-14 RX ORDER — HEPARIN SODIUM (PORCINE) LOCK FLUSH IV SOLN 100 UNIT/ML 100 UNIT/ML
500 SOLUTION INTRAVENOUS AS NEEDED
Status: DISCONTINUED | OUTPATIENT
Start: 2023-04-14 | End: 2023-04-14 | Stop reason: HOSPADM

## 2023-04-14 RX ADMIN — HEPARIN 500 UNITS: 100 SYRINGE at 16:08

## 2023-04-14 RX ADMIN — Medication 10 ML: at 16:08

## 2023-04-14 RX ADMIN — PROMETHAZINE HYDROCHLORIDE 12.5 MG: 25 INJECTION INTRAMUSCULAR; INTRAVENOUS at 15:32

## 2023-04-14 RX ADMIN — DEXAMETHASONE SODIUM PHOSPHATE: 10 INJECTION, SOLUTION INTRAMUSCULAR; INTRAVENOUS at 14:57

## 2023-04-14 RX ADMIN — PEGFILGRASTIM-CBQV 6 MG: 6 INJECTION, SOLUTION SUBCUTANEOUS at 15:59

## 2023-04-27 ENCOUNTER — INFUSION (OUTPATIENT)
Dept: ONCOLOGY | Facility: HOSPITAL | Age: 38
End: 2023-04-27
Payer: COMMERCIAL

## 2023-04-27 ENCOUNTER — OFFICE VISIT (OUTPATIENT)
Dept: ONCOLOGY | Facility: CLINIC | Age: 38
End: 2023-04-27
Payer: COMMERCIAL

## 2023-04-27 VITALS
SYSTOLIC BLOOD PRESSURE: 147 MMHG | RESPIRATION RATE: 18 BRPM | DIASTOLIC BLOOD PRESSURE: 90 MMHG | BODY MASS INDEX: 24.43 KG/M2 | OXYGEN SATURATION: 100 % | WEIGHT: 156 LBS | TEMPERATURE: 96 F | HEART RATE: 92 BPM

## 2023-04-27 DIAGNOSIS — C50.411 MALIGNANT NEOPLASM OF UPPER-OUTER QUADRANT OF RIGHT BREAST IN FEMALE, ESTROGEN RECEPTOR POSITIVE: ICD-10-CM

## 2023-04-27 DIAGNOSIS — Z17.0 MALIGNANT NEOPLASM OF UPPER-OUTER QUADRANT OF RIGHT BREAST IN FEMALE, ESTROGEN RECEPTOR POSITIVE: Primary | ICD-10-CM

## 2023-04-27 DIAGNOSIS — T45.1X5A CINV (CHEMOTHERAPY-INDUCED NAUSEA AND VOMITING): ICD-10-CM

## 2023-04-27 DIAGNOSIS — R11.2 CINV (CHEMOTHERAPY-INDUCED NAUSEA AND VOMITING): ICD-10-CM

## 2023-04-27 DIAGNOSIS — Z17.0 MALIGNANT NEOPLASM OF UPPER-OUTER QUADRANT OF RIGHT BREAST IN FEMALE, ESTROGEN RECEPTOR POSITIVE: ICD-10-CM

## 2023-04-27 DIAGNOSIS — Z45.2 ENCOUNTER FOR VENOUS ACCESS DEVICE CARE: ICD-10-CM

## 2023-04-27 DIAGNOSIS — C50.411 MALIGNANT NEOPLASM OF UPPER-OUTER QUADRANT OF RIGHT BREAST IN FEMALE, ESTROGEN RECEPTOR POSITIVE: Primary | ICD-10-CM

## 2023-04-27 LAB
ALBUMIN SERPL-MCNC: 4.4 G/DL (ref 3.5–5.2)
ALBUMIN/GLOB SERPL: 1.9 G/DL
ALP SERPL-CCNC: 117 U/L (ref 39–117)
ALT SERPL W P-5'-P-CCNC: 24 U/L (ref 1–33)
ANION GAP SERPL CALCULATED.3IONS-SCNC: 11 MMOL/L (ref 5–15)
ANISOCYTOSIS BLD QL: ABNORMAL
AST SERPL-CCNC: 17 U/L (ref 1–32)
BASOPHILS # BLD MANUAL: 0.05 10*3/MM3 (ref 0–0.2)
BASOPHILS NFR BLD MANUAL: 1 % (ref 0–1.5)
BILIRUB SERPL-MCNC: 0.6 MG/DL (ref 0–1.2)
BUN SERPL-MCNC: 13 MG/DL (ref 6–20)
BUN/CREAT SERPL: 20 (ref 7–25)
CALCIUM SPEC-SCNC: 9.5 MG/DL (ref 8.6–10.5)
CHLORIDE SERPL-SCNC: 105 MMOL/L (ref 98–107)
CO2 SERPL-SCNC: 26 MMOL/L (ref 22–29)
CREAT SERPL-MCNC: 0.65 MG/DL (ref 0.57–1)
DEPRECATED RDW RBC AUTO: 53.5 FL (ref 37–54)
EGFRCR SERPLBLD CKD-EPI 2021: 115.7 ML/MIN/1.73
ERYTHROCYTE [DISTWIDTH] IN BLOOD BY AUTOMATED COUNT: 17.3 % (ref 12.3–15.4)
GLOBULIN UR ELPH-MCNC: 2.3 GM/DL
GLUCOSE SERPL-MCNC: 143 MG/DL (ref 65–99)
HCT VFR BLD AUTO: 29.3 % (ref 34–46.6)
HGB BLD-MCNC: 10.3 G/DL (ref 12–15.9)
HOLD SPECIMEN: NORMAL
LYMPHOCYTES # BLD MANUAL: 1.12 10*3/MM3 (ref 0.7–3.1)
LYMPHOCYTES NFR BLD MANUAL: 10 % (ref 5–12)
MCH RBC QN AUTO: 31.7 PG (ref 26.6–33)
MCHC RBC AUTO-ENTMCNC: 35.2 G/DL (ref 31.5–35.7)
MCV RBC AUTO: 90.2 FL (ref 79–97)
METAMYELOCYTES NFR BLD MANUAL: 3 % (ref 0–0)
MONOCYTES # BLD: 0.51 10*3/MM3 (ref 0.1–0.9)
NEUTROPHILS # BLD AUTO: 3.26 10*3/MM3 (ref 1.7–7)
NEUTROPHILS NFR BLD MANUAL: 54 % (ref 42.7–76)
NEUTS BAND NFR BLD MANUAL: 10 % (ref 0–5)
PLATELET # BLD AUTO: 249 10*3/MM3 (ref 140–450)
PMV BLD AUTO: 10.6 FL (ref 6–12)
POTASSIUM SERPL-SCNC: 3.7 MMOL/L (ref 3.5–5.2)
PROT SERPL-MCNC: 6.7 G/DL (ref 6–8.5)
RBC # BLD AUTO: 3.25 10*6/MM3 (ref 3.77–5.28)
SMALL PLATELETS BLD QL SMEAR: ADEQUATE
SODIUM SERPL-SCNC: 142 MMOL/L (ref 136–145)
VARIANT LYMPHS NFR BLD MANUAL: 22 % (ref 19.6–45.3)
WBC MORPH BLD: NORMAL
WBC NRBC COR # BLD: 5.1 10*3/MM3 (ref 3.4–10.8)

## 2023-04-27 PROCEDURE — 25010000002 HEPARIN LOCK FLUSH PER 10 UNITS: Performed by: INTERNAL MEDICINE

## 2023-04-27 PROCEDURE — 85007 BL SMEAR W/DIFF WBC COUNT: CPT

## 2023-04-27 PROCEDURE — 25010000002 ONDANSETRON PER 1 MG: Performed by: INTERNAL MEDICINE

## 2023-04-27 PROCEDURE — 25010000002 PACLITAXEL PER 1 MG: Performed by: INTERNAL MEDICINE

## 2023-04-27 PROCEDURE — 85025 COMPLETE CBC W/AUTO DIFF WBC: CPT

## 2023-04-27 PROCEDURE — 36415 COLL VENOUS BLD VENIPUNCTURE: CPT

## 2023-04-27 PROCEDURE — 25010000002 DIPHENHYDRAMINE PER 50 MG: Performed by: INTERNAL MEDICINE

## 2023-04-27 PROCEDURE — 80053 COMPREHEN METABOLIC PANEL: CPT

## 2023-04-27 PROCEDURE — 25010000002 DEXAMETHASONE SODIUM PHOSPHATE 100 MG/10ML SOLUTION: Performed by: INTERNAL MEDICINE

## 2023-04-27 RX ORDER — DIPHENHYDRAMINE HYDROCHLORIDE 50 MG/ML
50 INJECTION INTRAMUSCULAR; INTRAVENOUS AS NEEDED
Status: CANCELLED | OUTPATIENT
Start: 2023-04-27

## 2023-04-27 RX ORDER — HEPARIN SODIUM (PORCINE) LOCK FLUSH IV SOLN 100 UNIT/ML 100 UNIT/ML
500 SOLUTION INTRAVENOUS AS NEEDED
Status: DISCONTINUED | OUTPATIENT
Start: 2023-04-27 | End: 2023-04-27 | Stop reason: HOSPADM

## 2023-04-27 RX ORDER — SODIUM CHLORIDE 9 MG/ML
250 INJECTION, SOLUTION INTRAVENOUS ONCE
Status: COMPLETED | OUTPATIENT
Start: 2023-04-27 | End: 2023-04-27

## 2023-04-27 RX ORDER — SODIUM CHLORIDE 9 MG/ML
250 INJECTION, SOLUTION INTRAVENOUS ONCE
Status: CANCELLED | OUTPATIENT
Start: 2023-04-27

## 2023-04-27 RX ORDER — SODIUM CHLORIDE 0.9 % (FLUSH) 0.9 %
10 SYRINGE (ML) INJECTION AS NEEDED
Status: DISCONTINUED | OUTPATIENT
Start: 2023-04-27 | End: 2023-04-27 | Stop reason: HOSPADM

## 2023-04-27 RX ORDER — FAMOTIDINE 10 MG/ML
20 INJECTION, SOLUTION INTRAVENOUS ONCE
Status: COMPLETED | OUTPATIENT
Start: 2023-04-27 | End: 2023-04-27

## 2023-04-27 RX ORDER — FAMOTIDINE 10 MG/ML
20 INJECTION, SOLUTION INTRAVENOUS AS NEEDED
Status: DISCONTINUED | OUTPATIENT
Start: 2023-04-27 | End: 2023-04-27 | Stop reason: HOSPADM

## 2023-04-27 RX ORDER — FAMOTIDINE 10 MG/ML
20 INJECTION, SOLUTION INTRAVENOUS AS NEEDED
Status: CANCELLED | OUTPATIENT
Start: 2023-04-27

## 2023-04-27 RX ORDER — ONDANSETRON HCL IN 0.9 % NACL 8 MG/50 ML
8 INTRAVENOUS SOLUTION, PIGGYBACK (ML) INTRAVENOUS ONCE
Status: COMPLETED | OUTPATIENT
Start: 2023-04-27 | End: 2023-04-27

## 2023-04-27 RX ORDER — METHYLPREDNISOLONE SODIUM SUCCINATE 40 MG/ML
40 INJECTION, POWDER, LYOPHILIZED, FOR SOLUTION INTRAMUSCULAR; INTRAVENOUS AS NEEDED
Status: DISCONTINUED | OUTPATIENT
Start: 2023-04-27 | End: 2023-04-27 | Stop reason: HOSPADM

## 2023-04-27 RX ORDER — HEPARIN SODIUM (PORCINE) LOCK FLUSH IV SOLN 100 UNIT/ML 100 UNIT/ML
500 SOLUTION INTRAVENOUS AS NEEDED
OUTPATIENT
Start: 2023-04-27

## 2023-04-27 RX ORDER — DIPHENHYDRAMINE HYDROCHLORIDE 50 MG/ML
50 INJECTION INTRAMUSCULAR; INTRAVENOUS AS NEEDED
Status: DISCONTINUED | OUTPATIENT
Start: 2023-04-27 | End: 2023-04-27 | Stop reason: HOSPADM

## 2023-04-27 RX ORDER — SODIUM CHLORIDE 0.9 % (FLUSH) 0.9 %
10 SYRINGE (ML) INJECTION AS NEEDED
OUTPATIENT
Start: 2023-04-27

## 2023-04-27 RX ORDER — ONDANSETRON HCL IN 0.9 % NACL 8 MG/50 ML
8 INTRAVENOUS SOLUTION, PIGGYBACK (ML) INTRAVENOUS ONCE
Status: CANCELLED
Start: 2023-04-27 | End: 2023-04-27

## 2023-04-27 RX ORDER — FAMOTIDINE 10 MG/ML
20 INJECTION, SOLUTION INTRAVENOUS ONCE
Status: CANCELLED | OUTPATIENT
Start: 2023-04-27

## 2023-04-27 RX ADMIN — SODIUM CHLORIDE 250 ML: 9 INJECTION, SOLUTION INTRAVENOUS at 08:45

## 2023-04-27 RX ADMIN — HEPARIN 500 UNITS: 100 SYRINGE at 13:35

## 2023-04-27 RX ADMIN — DIPHENHYDRAMINE HYDROCHLORIDE 50 MG: 50 INJECTION, SOLUTION INTRAMUSCULAR; INTRAVENOUS at 09:11

## 2023-04-27 RX ADMIN — ONDANSETRON 8 MG: 2 INJECTION INTRAMUSCULAR; INTRAVENOUS at 09:34

## 2023-04-27 RX ADMIN — DEXAMETHASONE SODIUM PHOSPHATE 20 MG: 10 INJECTION, SOLUTION INTRAMUSCULAR; INTRAVENOUS at 10:01

## 2023-04-27 RX ADMIN — PACLITAXEL 320 MG: 6 INJECTION, SOLUTION, CONCENTRATE INTRAVENOUS at 10:34

## 2023-04-27 RX ADMIN — FAMOTIDINE 20 MG: 10 INJECTION INTRAVENOUS at 09:03

## 2023-04-27 NOTE — PROGRESS NOTES
Subjective     Christie Griffith was seen in follow up for breast cancer.      She is scheduled to start dose dense paclitaxel today.  Side effects associated with chemotherapy discussed at length.  Risk versus benefit discussed.  She understood and was agreeable to proceed.    Past Medical History, Past Surgical History, Social History, Family History have been reviewed and are without significant changes except as mentioned.        Medications:  The current medication list was reviewed in the EMR    ALLERGIES:    Allergies   Allergen Reactions   • Emend [Fosaprepitant] Shortness Of Breath   • Transderm-Scop [Scopolamine] Dizziness       Objective      Vitals:    04/27/23 0754   BP: 147/90   Pulse: 92   Resp: 18   Temp: 96 °F (35.6 °C)   SpO2: 100%             4/14/2023     2:53 PM   Current Status   ECOG score 0       Physical Exam  Vitals and nursing note reviewed.   Constitutional:       Appearance: Normal appearance.   Neurological:      General: No focal deficit present.      Mental Status: She is alert and oriented to person, place, and time. Mental status is at baseline.   Psychiatric:         Mood and Affect: Mood normal.         Behavior: Behavior normal.         Thought Content: Thought content normal.               RECENT LABS:Independently reviewed and summarized  Hematology WBC   Date Value Ref Range Status   04/12/2023   Final     Comment:     See scanned report       RBC   Date Value Ref Range Status   04/12/2023   Final     Comment:     See scanned report       Hemoglobin   Date Value Ref Range Status   04/12/2023   Final     Comment:     See scanned report       Hematocrit   Date Value Ref Range Status   04/12/2023   Final     Comment:     See scanned report       Platelets   Date Value Ref Range Status   04/12/2023   Final     Comment:     See scanned report              WBC   Date Value Ref Range Status   04/27/2023 5.10 3.40 - 10.80 10*3/mm3 Final     RBC   Date Value Ref Range Status    04/27/2023 3.25 (L) 3.77 - 5.28 10*6/mm3 Final     Hemoglobin   Date Value Ref Range Status   04/27/2023 10.3 (L) 12.0 - 15.9 g/dL Final     Hematocrit   Date Value Ref Range Status   04/27/2023 29.3 (L) 34.0 - 46.6 % Final     MCV   Date Value Ref Range Status   04/27/2023 90.2 79.0 - 97.0 fL Final     MCH   Date Value Ref Range Status   04/27/2023 31.7 26.6 - 33.0 pg Final     MCHC   Date Value Ref Range Status   04/27/2023 35.2 31.5 - 35.7 g/dL Final     RDW   Date Value Ref Range Status   04/27/2023 17.3 (H) 12.3 - 15.4 % Final     RDW-SD   Date Value Ref Range Status   04/27/2023 53.5 37.0 - 54.0 fl Final     MPV   Date Value Ref Range Status   04/27/2023 10.6 6.0 - 12.0 fL Final     Platelets   Date Value Ref Range Status   04/27/2023 249 140 - 450 10*3/mm3 Final     Neutrophil %   Date Value Ref Range Status   03/02/2023 64.7 42.7 - 76.0 % Final     Lymphocyte %   Date Value Ref Range Status   03/02/2023 26.1 19.6 - 45.3 % Final     Monocyte %   Date Value Ref Range Status   03/02/2023 7.3 5.0 - 12.0 % Final     Eosinophil %   Date Value Ref Range Status   03/02/2023 0.6 0.3 - 6.2 % Final     Basophil %   Date Value Ref Range Status   03/02/2023 1.0 0.0 - 1.5 % Final     Immature Grans %   Date Value Ref Range Status   03/02/2023 0.3 0.0 - 0.5 % Final     Neutrophils Absolute   Date Value Ref Range Status   04/27/2023 3.26 1.70 - 7.00 10*3/mm3 Final     Neutrophils, Absolute   Date Value Ref Range Status   03/02/2023 4.06 1.70 - 7.00 10*3/mm3 Final     Lymphocytes, Absolute   Date Value Ref Range Status   03/02/2023 1.64 0.70 - 3.10 10*3/mm3 Final     Monocytes, Absolute   Date Value Ref Range Status   03/02/2023 0.46 0.10 - 0.90 10*3/mm3 Final     Eosinophils Absolute   Date Value Ref Range Status   03/30/2023 0.06 0.00 - 0.40 10*3/mm3 Final     Eosinophils, Absolute   Date Value Ref Range Status   03/02/2023 0.04 0.00 - 0.40 10*3/mm3 Final     Basophils Absolute   Date Value Ref Range Status   04/27/2023  0.05 0.00 - 0.20 10*3/mm3 Final     Basophils, Absolute   Date Value Ref Range Status   03/02/2023 0.06 0.00 - 0.20 10*3/mm3 Final     Immature Grans, Absolute   Date Value Ref Range Status   03/02/2023 0.02 0.00 - 0.05 10*3/mm3 Final     nRBC   Date Value Ref Range Status   03/02/2023 0.0 0.0 - 0.2 /100 WBC Final     Lab Results   Component Value Date    GLUCOSE 143 (H) 04/27/2023    BUN 13 04/27/2023    CREATININE 0.65 04/27/2023    EGFR 115.7 04/27/2023    BCR 20.0 04/27/2023    K 3.7 04/27/2023    CO2 26.0 04/27/2023    CALCIUM 9.5 04/27/2023    ALBUMIN 4.4 04/27/2023    BILITOT 0.6 04/27/2023    AST 17 04/27/2023    ALT 24 04/27/2023       Christie Griffith reports a pain score of 0.  Given her pain assessment as noted, treatment options were discussed and the following options were decided upon as a follow-up plan to address the patient's pain: continuation of current treatment plan for pain.    Patient screened negative for depression based on a PHQ-9 score of 0 on 4/27/2023.         Diagnosis:      (1) Right breast cancer   Stage IIB (cT2, cN0, cM0, G3)      ER 20% IN 1%   Poorly differentiated      Current therapy:   Dose dense AC   Cycle 1: 3/2/23   Cycle 2: 3/16/23   Cycle 3: 3/20/23   Cycle 4: 4/13/23     Dose dense paclitaxel   Cycle 1: 4/27/23     (2) CINV   (3) Skin rash   (4) Mucositis     Assessment & Plan     (1) Right breast cancer     Chronic, stable.   She is currently on neoadjuvant dose dense AC followed by dose dense Taxol.  Completed AC.  She is scheduled to start dose dense paclitaxel today.  She is feeling overall well.  Labs look stable.  Cycle 1 dose dense paclitaxel with Neulasta growth factor was signed on today's visit.  Breast mass is significantly smaller indicating favorable response to treatment.    I will see her back in 2 weeks with CBC, CMP prior to cycle 2.    (2) CINV     Chronic, stable.   Continue Zofran and Phenergan as needed.  We will closely monitor.    (3) Skin  rash     Chronic, stable.  Acneform rash involving face.  This is improved with clindamycin ointment.  She will continue this for    (4) Mucositis     Chronic, stable  Mucositis from chemotherapy.  Continue Magic mouthwash as needed.      4/26/2023      CC:

## 2023-04-28 ENCOUNTER — INFUSION (OUTPATIENT)
Dept: ONCOLOGY | Facility: HOSPITAL | Age: 38
End: 2023-04-28
Payer: COMMERCIAL

## 2023-04-28 DIAGNOSIS — C50.411 MALIGNANT NEOPLASM OF UPPER-OUTER QUADRANT OF RIGHT BREAST IN FEMALE, ESTROGEN RECEPTOR POSITIVE: Primary | ICD-10-CM

## 2023-04-28 DIAGNOSIS — Z17.0 MALIGNANT NEOPLASM OF UPPER-OUTER QUADRANT OF RIGHT BREAST IN FEMALE, ESTROGEN RECEPTOR POSITIVE: Primary | ICD-10-CM

## 2023-04-28 PROCEDURE — 25010000002 PEGFILGRASTIM-CBQV 6 MG/0.6ML SOLUTION PREFILLED SYRINGE: Performed by: INTERNAL MEDICINE

## 2023-04-28 RX ADMIN — PEGFILGRASTIM-CBQV 6 MG: 6 INJECTION, SOLUTION SUBCUTANEOUS at 14:29

## 2023-05-01 ENCOUNTER — TELEPHONE (OUTPATIENT)
Dept: ONCOLOGY | Facility: HOSPITAL | Age: 38
End: 2023-05-01
Payer: COMMERCIAL

## 2023-05-01 NOTE — TELEPHONE ENCOUNTER
"Follow up to assess patient after receiving chemotherapy/biotherapy on 4/27/23.   Patient/Caregiver states she is currently not experiencing any issues. She states she is \"feel much better\" this time.    Reinforced to notify us for any of the following: nausea, vomiting, loss of appetite, fever greater than 100.4, etc.    15:54 CDT   Marcos Nye RN   "

## 2023-05-10 NOTE — PROGRESS NOTES
Subjective     Christie Griffith was seen in follow-up for breast cancer.  She is overall doing well.  Denies any aches or pains.  Continue to deal with allergies.  She did report painful finger followed by some skin desquamation after the chemo.  This has improved.  No open ulcers.  Able to do her day-to-day tasks.  Denies any numbness or tingling in extremities    Past Medical History, Past Surgical History, Social History, Family History have been reviewed and are without significant changes except as mentioned.        Medications:  The current medication list was reviewed in the EMR    ALLERGIES:    Allergies   Allergen Reactions   • Emend [Fosaprepitant] Shortness Of Breath   • Transderm-Scop [Scopolamine] Dizziness       Objective      Vitals:    05/11/23 0801   BP: 155/100   Pulse: 110   Resp: 18   Temp: 96.3 °F (35.7 °C)   SpO2: 96%             4/14/2023     2:53 PM   Current Status   ECOG score 0       Physical Exam  Vitals and nursing note reviewed.   Constitutional:       Appearance: Normal appearance.   Skin:     Comments: Skin desquamation involving fingertips.  No open or sore areas.   Neurological:      General: No focal deficit present.      Mental Status: She is alert and oriented to person, place, and time. Mental status is at baseline.   Psychiatric:         Mood and Affect: Mood normal.         Behavior: Behavior normal.         Thought Content: Thought content normal.               RECENT LABS:Independently reviewed and summarized  Hematology WBC   Date Value Ref Range Status   04/27/2023 5.10 3.40 - 10.80 10*3/mm3 Final     RBC   Date Value Ref Range Status   04/27/2023 3.25 (L) 3.77 - 5.28 10*6/mm3 Final     Hemoglobin   Date Value Ref Range Status   04/27/2023 10.3 (L) 12.0 - 15.9 g/dL Final     Hematocrit   Date Value Ref Range Status   04/27/2023 29.3 (L) 34.0 - 46.6 % Final     Platelets   Date Value Ref Range Status   04/27/2023 249 140 - 450 10*3/mm3 Final     WBC   Date Value Ref Range  Status   05/11/2023 10.84 (H) 3.40 - 10.80 10*3/mm3 Final     RBC   Date Value Ref Range Status   05/11/2023 3.36 (L) 3.77 - 5.28 10*6/mm3 Final     Hemoglobin   Date Value Ref Range Status   05/11/2023 10.7 (L) 12.0 - 15.9 g/dL Final     Hematocrit   Date Value Ref Range Status   05/11/2023 31.0 (L) 34.0 - 46.6 % Final     MCV   Date Value Ref Range Status   05/11/2023 92.3 79.0 - 97.0 fL Final     MCH   Date Value Ref Range Status   05/11/2023 31.8 26.6 - 33.0 pg Final     MCHC   Date Value Ref Range Status   05/11/2023 34.5 31.5 - 35.7 g/dL Final     RDW   Date Value Ref Range Status   05/11/2023 17.2 (H) 12.3 - 15.4 % Final     RDW-SD   Date Value Ref Range Status   05/11/2023 56.6 (H) 37.0 - 54.0 fl Final     MPV   Date Value Ref Range Status   05/11/2023 10.2 6.0 - 12.0 fL Final     Platelets   Date Value Ref Range Status   05/11/2023 246 140 - 450 10*3/mm3 Final     Neutrophil %   Date Value Ref Range Status   03/02/2023 64.7 42.7 - 76.0 % Final     Lymphocyte %   Date Value Ref Range Status   03/02/2023 26.1 19.6 - 45.3 % Final     Monocyte %   Date Value Ref Range Status   03/02/2023 7.3 5.0 - 12.0 % Final     Eosinophil %   Date Value Ref Range Status   03/02/2023 0.6 0.3 - 6.2 % Final     Basophil %   Date Value Ref Range Status   03/02/2023 1.0 0.0 - 1.5 % Final     Immature Grans %   Date Value Ref Range Status   03/02/2023 0.3 0.0 - 0.5 % Final     Neutrophils Absolute   Date Value Ref Range Status   04/27/2023 3.26 1.70 - 7.00 10*3/mm3 Final     Neutrophils, Absolute   Date Value Ref Range Status   03/02/2023 4.06 1.70 - 7.00 10*3/mm3 Final     Lymphocytes, Absolute   Date Value Ref Range Status   03/02/2023 1.64 0.70 - 3.10 10*3/mm3 Final     Monocytes, Absolute   Date Value Ref Range Status   03/02/2023 0.46 0.10 - 0.90 10*3/mm3 Final     Eosinophils Absolute   Date Value Ref Range Status   03/30/2023 0.06 0.00 - 0.40 10*3/mm3 Final     Eosinophils, Absolute   Date Value Ref Range Status    03/02/2023 0.04 0.00 - 0.40 10*3/mm3 Final     Basophils Absolute   Date Value Ref Range Status   04/27/2023 0.05 0.00 - 0.20 10*3/mm3 Final     Basophils, Absolute   Date Value Ref Range Status   03/02/2023 0.06 0.00 - 0.20 10*3/mm3 Final     Immature Grans, Absolute   Date Value Ref Range Status   03/02/2023 0.02 0.00 - 0.05 10*3/mm3 Final     nRBC   Date Value Ref Range Status   03/02/2023 0.0 0.0 - 0.2 /100 WBC Final     Lab Results   Component Value Date    GLUCOSE 138 (H) 05/11/2023    BUN 12 05/11/2023    CREATININE 0.65 05/11/2023    EGFR 115.7 05/11/2023    BCR 18.5 05/11/2023    K 3.7 05/11/2023    CO2 24.0 05/11/2023    CALCIUM 9.3 05/11/2023    ALBUMIN 4.2 05/11/2023    BILITOT 0.8 05/11/2023    AST 25 05/11/2023    ALT 44 (H) 05/11/2023            Christie Griffith reports a pain score of 0.  Given her pain assessment as noted, treatment options were discussed and the following options were decided upon as a follow-up plan to address the patient's pain: continuation of current treatment plan for pain.    Patient screened negative for depression based on a PHQ-9 score of 0 on 5/11/2023.      Diagnosis:      (1) Right breast cancer   Stage IIB (cT2, cN0, cM0, G3)      ER 20% NE 1%   Poorly differentiated      Current therapy:   Dose dense AC   Cycle 1: 3/2/23   Cycle 2: 3/16/23   Cycle 3: 3/20/23   Cycle 4: 4/13/23      Dose dense paclitaxel   Cycle 1: 4/27/23  Cycle 2: 5/11/23     (2) CINV   (3) Peralta plantar erythrodysesthesia    Assessment & Plan       (1) Right breast cancer     Chronic, stable.  She is currently on neoadjuvant dose dense Taxol.  She tolerated chemotherapy well except for paclitaxel associated myalgias that lasted 3 days.  Responded to Tylenol and ibuprofen.  She has also developed palmar plantar erythrodysesthesia.  See management below.    Labs look stable.  Cycle 2 dose dense paclitaxel with Neulasta growth factor was signed on today's visit.    I will see her back in 2 weeks with  CBC, CMP prior to cycle 3.      (2) CINV     Chronic, stable.  Continue Zofran and Phenergan as needed.  We will closely monitor.    (3) Skin rash     Chronic, stable.  Acneform rash involving face.  This is improved with clindamycin ointment.  She will continue this.    (4) Mucositis     Chronic, stable.  Mucositis from chemotherapy.  Magic mouthwash as needed.    (3) Peralta plantar erythrodysesthesia    New issue.  Grade 2.  Secondary to chemotherapy.  Denies any pain.  No open ulcers.  She has been using moisturizers.  Keep using moisturizers.  Recommend she avoid harsh detergent/soaps.  I will send prescription of urea ointment to her pharmacy.  We will monitor closely.    5/10/2023      CC:

## 2023-05-11 ENCOUNTER — OFFICE VISIT (OUTPATIENT)
Dept: ONCOLOGY | Facility: CLINIC | Age: 38
End: 2023-05-11
Payer: COMMERCIAL

## 2023-05-11 ENCOUNTER — INFUSION (OUTPATIENT)
Dept: ONCOLOGY | Facility: HOSPITAL | Age: 38
End: 2023-05-11
Payer: COMMERCIAL

## 2023-05-11 VITALS
SYSTOLIC BLOOD PRESSURE: 155 MMHG | RESPIRATION RATE: 18 BRPM | TEMPERATURE: 96.3 F | WEIGHT: 156 LBS | HEART RATE: 110 BPM | OXYGEN SATURATION: 96 % | BODY MASS INDEX: 24.43 KG/M2 | DIASTOLIC BLOOD PRESSURE: 100 MMHG

## 2023-05-11 DIAGNOSIS — C50.411 MALIGNANT NEOPLASM OF UPPER-OUTER QUADRANT OF RIGHT BREAST IN FEMALE, ESTROGEN RECEPTOR POSITIVE: ICD-10-CM

## 2023-05-11 DIAGNOSIS — K12.31 MUCOSITIS DUE TO ANTINEOPLASTIC THERAPY: ICD-10-CM

## 2023-05-11 DIAGNOSIS — Z17.0 MALIGNANT NEOPLASM OF UPPER-OUTER QUADRANT OF RIGHT BREAST IN FEMALE, ESTROGEN RECEPTOR POSITIVE: Primary | ICD-10-CM

## 2023-05-11 DIAGNOSIS — R11.2 CINV (CHEMOTHERAPY-INDUCED NAUSEA AND VOMITING): ICD-10-CM

## 2023-05-11 DIAGNOSIS — C50.411 MALIGNANT NEOPLASM OF UPPER-OUTER QUADRANT OF RIGHT BREAST IN FEMALE, ESTROGEN RECEPTOR POSITIVE: Primary | ICD-10-CM

## 2023-05-11 DIAGNOSIS — Z17.0 MALIGNANT NEOPLASM OF UPPER-OUTER QUADRANT OF RIGHT BREAST IN FEMALE, ESTROGEN RECEPTOR POSITIVE: ICD-10-CM

## 2023-05-11 DIAGNOSIS — Z45.2 ENCOUNTER FOR VENOUS ACCESS DEVICE CARE: ICD-10-CM

## 2023-05-11 DIAGNOSIS — T45.1X5A CINV (CHEMOTHERAPY-INDUCED NAUSEA AND VOMITING): ICD-10-CM

## 2023-05-11 DIAGNOSIS — L27.1 PALMAR PLANTAR ERYTHRODYSAESTHESIA DUE TO CYTOTOXIC THERAPY: ICD-10-CM

## 2023-05-11 LAB
ALBUMIN SERPL-MCNC: 4.2 G/DL (ref 3.5–5.2)
ALBUMIN/GLOB SERPL: 1.8 G/DL
ALP SERPL-CCNC: 151 U/L (ref 39–117)
ALT SERPL W P-5'-P-CCNC: 44 U/L (ref 1–33)
ANION GAP SERPL CALCULATED.3IONS-SCNC: 11 MMOL/L (ref 5–15)
ANISOCYTOSIS BLD QL: ABNORMAL
AST SERPL-CCNC: 25 U/L (ref 1–32)
BASOPHILS # BLD MANUAL: 0.11 10*3/MM3 (ref 0–0.2)
BASOPHILS NFR BLD MANUAL: 1 % (ref 0–1.5)
BILIRUB SERPL-MCNC: 0.8 MG/DL (ref 0–1.2)
BUN SERPL-MCNC: 12 MG/DL (ref 6–20)
BUN/CREAT SERPL: 18.5 (ref 7–25)
CALCIUM SPEC-SCNC: 9.3 MG/DL (ref 8.6–10.5)
CHLORIDE SERPL-SCNC: 106 MMOL/L (ref 98–107)
CO2 SERPL-SCNC: 24 MMOL/L (ref 22–29)
CREAT SERPL-MCNC: 0.65 MG/DL (ref 0.57–1)
DEPRECATED RDW RBC AUTO: 56.6 FL (ref 37–54)
EGFRCR SERPLBLD CKD-EPI 2021: 115.7 ML/MIN/1.73
EOSINOPHIL # BLD MANUAL: 0.11 10*3/MM3 (ref 0–0.4)
EOSINOPHIL NFR BLD MANUAL: 1 % (ref 0.3–6.2)
ERYTHROCYTE [DISTWIDTH] IN BLOOD BY AUTOMATED COUNT: 17.2 % (ref 12.3–15.4)
GLOBULIN UR ELPH-MCNC: 2.4 GM/DL
GLUCOSE SERPL-MCNC: 138 MG/DL (ref 65–99)
HCT VFR BLD AUTO: 31 % (ref 34–46.6)
HGB BLD-MCNC: 10.7 G/DL (ref 12–15.9)
HOLD SPECIMEN: NORMAL
LYMPHOCYTES # BLD MANUAL: 1.84 10*3/MM3 (ref 0.7–3.1)
LYMPHOCYTES NFR BLD MANUAL: 6 % (ref 5–12)
MCH RBC QN AUTO: 31.8 PG (ref 26.6–33)
MCHC RBC AUTO-ENTMCNC: 34.5 G/DL (ref 31.5–35.7)
MCV RBC AUTO: 92.3 FL (ref 79–97)
METAMYELOCYTES NFR BLD MANUAL: 1 % (ref 0–0)
MONOCYTES # BLD: 0.65 10*3/MM3 (ref 0.1–0.9)
MYELOCYTES NFR BLD MANUAL: 2 % (ref 0–0)
NEUTROPHILS # BLD AUTO: 7.8 10*3/MM3 (ref 1.7–7)
NEUTROPHILS NFR BLD MANUAL: 70 % (ref 42.7–76)
NEUTS BAND NFR BLD MANUAL: 2 % (ref 0–5)
PLAT MORPH BLD: NORMAL
PLATELET # BLD AUTO: 246 10*3/MM3 (ref 140–450)
PMV BLD AUTO: 10.2 FL (ref 6–12)
POLYCHROMASIA BLD QL SMEAR: ABNORMAL
POTASSIUM SERPL-SCNC: 3.7 MMOL/L (ref 3.5–5.2)
PROT SERPL-MCNC: 6.6 G/DL (ref 6–8.5)
RBC # BLD AUTO: 3.36 10*6/MM3 (ref 3.77–5.28)
SODIUM SERPL-SCNC: 141 MMOL/L (ref 136–145)
VARIANT LYMPHS NFR BLD MANUAL: 15 % (ref 19.6–45.3)
VARIANT LYMPHS NFR BLD MANUAL: 2 % (ref 0–5)
WBC MORPH BLD: NORMAL
WBC NRBC COR # BLD: 10.84 10*3/MM3 (ref 3.4–10.8)

## 2023-05-11 PROCEDURE — 36415 COLL VENOUS BLD VENIPUNCTURE: CPT

## 2023-05-11 PROCEDURE — 25010000002 DIPHENHYDRAMINE PER 50 MG: Performed by: INTERNAL MEDICINE

## 2023-05-11 PROCEDURE — 25010000002 DEXAMETHASONE SODIUM PHOSPHATE 100 MG/10ML SOLUTION: Performed by: INTERNAL MEDICINE

## 2023-05-11 PROCEDURE — 80053 COMPREHEN METABOLIC PANEL: CPT

## 2023-05-11 PROCEDURE — 25010000002 HEPARIN LOCK FLUSH PER 10 UNITS: Performed by: INTERNAL MEDICINE

## 2023-05-11 PROCEDURE — 25010000002 PACLITAXEL PER 1 MG: Performed by: INTERNAL MEDICINE

## 2023-05-11 PROCEDURE — 85007 BL SMEAR W/DIFF WBC COUNT: CPT

## 2023-05-11 PROCEDURE — 25010000002 ONDANSETRON PER 1 MG: Performed by: INTERNAL MEDICINE

## 2023-05-11 PROCEDURE — 85025 COMPLETE CBC W/AUTO DIFF WBC: CPT

## 2023-05-11 RX ORDER — ONDANSETRON HCL IN 0.9 % NACL 8 MG/50 ML
8 INTRAVENOUS SOLUTION, PIGGYBACK (ML) INTRAVENOUS ONCE
Status: COMPLETED | OUTPATIENT
Start: 2023-05-11 | End: 2023-05-11

## 2023-05-11 RX ORDER — ONDANSETRON HCL IN 0.9 % NACL 8 MG/50 ML
8 INTRAVENOUS SOLUTION, PIGGYBACK (ML) INTRAVENOUS ONCE
Status: CANCELLED
Start: 2023-05-11 | End: 2023-05-11

## 2023-05-11 RX ORDER — DIPHENHYDRAMINE HYDROCHLORIDE 50 MG/ML
50 INJECTION INTRAMUSCULAR; INTRAVENOUS AS NEEDED
Status: CANCELLED | OUTPATIENT
Start: 2023-05-11

## 2023-05-11 RX ORDER — FAMOTIDINE 10 MG/ML
20 INJECTION, SOLUTION INTRAVENOUS ONCE
Status: COMPLETED | OUTPATIENT
Start: 2023-05-11 | End: 2023-05-11

## 2023-05-11 RX ORDER — FAMOTIDINE 10 MG/ML
20 INJECTION, SOLUTION INTRAVENOUS ONCE
Status: CANCELLED | OUTPATIENT
Start: 2023-05-11

## 2023-05-11 RX ORDER — HEPARIN SODIUM (PORCINE) LOCK FLUSH IV SOLN 100 UNIT/ML 100 UNIT/ML
500 SOLUTION INTRAVENOUS AS NEEDED
OUTPATIENT
Start: 2023-05-11

## 2023-05-11 RX ORDER — DIPHENHYDRAMINE HYDROCHLORIDE 50 MG/ML
50 INJECTION INTRAMUSCULAR; INTRAVENOUS AS NEEDED
Status: DISCONTINUED | OUTPATIENT
Start: 2023-05-11 | End: 2023-05-11 | Stop reason: HOSPADM

## 2023-05-11 RX ORDER — SODIUM CHLORIDE 9 MG/ML
250 INJECTION, SOLUTION INTRAVENOUS ONCE
Status: COMPLETED | OUTPATIENT
Start: 2023-05-11 | End: 2023-05-11

## 2023-05-11 RX ORDER — FAMOTIDINE 10 MG/ML
20 INJECTION, SOLUTION INTRAVENOUS AS NEEDED
Status: DISCONTINUED | OUTPATIENT
Start: 2023-05-11 | End: 2023-05-11 | Stop reason: HOSPADM

## 2023-05-11 RX ORDER — HEPARIN SODIUM (PORCINE) LOCK FLUSH IV SOLN 100 UNIT/ML 100 UNIT/ML
500 SOLUTION INTRAVENOUS AS NEEDED
Status: DISCONTINUED | OUTPATIENT
Start: 2023-05-11 | End: 2023-05-11 | Stop reason: HOSPADM

## 2023-05-11 RX ORDER — SODIUM CHLORIDE 0.9 % (FLUSH) 0.9 %
10 SYRINGE (ML) INJECTION AS NEEDED
OUTPATIENT
Start: 2023-05-11

## 2023-05-11 RX ORDER — FAMOTIDINE 10 MG/ML
20 INJECTION, SOLUTION INTRAVENOUS AS NEEDED
Status: CANCELLED | OUTPATIENT
Start: 2023-05-11

## 2023-05-11 RX ORDER — SODIUM CHLORIDE 9 MG/ML
250 INJECTION, SOLUTION INTRAVENOUS ONCE
Status: CANCELLED | OUTPATIENT
Start: 2023-05-11

## 2023-05-11 RX ORDER — SODIUM CHLORIDE 0.9 % (FLUSH) 0.9 %
10 SYRINGE (ML) INJECTION AS NEEDED
Status: DISCONTINUED | OUTPATIENT
Start: 2023-05-11 | End: 2023-05-11 | Stop reason: HOSPADM

## 2023-05-11 RX ADMIN — ONDANSETRON 8 MG: 2 INJECTION INTRAMUSCULAR; INTRAVENOUS at 09:32

## 2023-05-11 RX ADMIN — DEXAMETHASONE SODIUM PHOSPHATE 20 MG: 10 INJECTION, SOLUTION INTRAMUSCULAR; INTRAVENOUS at 09:59

## 2023-05-11 RX ADMIN — SODIUM CHLORIDE 250 ML: 9 INJECTION, SOLUTION INTRAVENOUS at 08:41

## 2023-05-11 RX ADMIN — PACLITAXEL 320 MG: 6 INJECTION, SOLUTION, CONCENTRATE INTRAVENOUS at 10:33

## 2023-05-11 RX ADMIN — HEPARIN 500 UNITS: 100 SYRINGE at 13:53

## 2023-05-11 RX ADMIN — DIPHENHYDRAMINE HYDROCHLORIDE 50 MG: 50 INJECTION, SOLUTION INTRAMUSCULAR; INTRAVENOUS at 08:54

## 2023-05-11 RX ADMIN — FAMOTIDINE 20 MG: 10 INJECTION INTRAVENOUS at 08:49

## 2023-05-12 ENCOUNTER — INFUSION (OUTPATIENT)
Dept: ONCOLOGY | Facility: HOSPITAL | Age: 38
End: 2023-05-12
Payer: COMMERCIAL

## 2023-05-12 VITALS
HEART RATE: 96 BPM | DIASTOLIC BLOOD PRESSURE: 88 MMHG | TEMPERATURE: 97.6 F | SYSTOLIC BLOOD PRESSURE: 155 MMHG | RESPIRATION RATE: 18 BRPM

## 2023-05-12 DIAGNOSIS — C50.411 MALIGNANT NEOPLASM OF UPPER-OUTER QUADRANT OF RIGHT BREAST IN FEMALE, ESTROGEN RECEPTOR POSITIVE: Primary | ICD-10-CM

## 2023-05-12 DIAGNOSIS — Z17.0 MALIGNANT NEOPLASM OF UPPER-OUTER QUADRANT OF RIGHT BREAST IN FEMALE, ESTROGEN RECEPTOR POSITIVE: Primary | ICD-10-CM

## 2023-05-12 PROCEDURE — 25010000002 PEGFILGRASTIM-CBQV 6 MG/0.6ML SOLUTION PREFILLED SYRINGE: Performed by: INTERNAL MEDICINE

## 2023-05-12 RX ADMIN — PEGFILGRASTIM-CBQV 6 MG: 6 INJECTION, SOLUTION SUBCUTANEOUS at 15:11

## 2023-05-24 NOTE — PROGRESS NOTES
Subjective     Christie Griffith was seen in follow up for breast cancer.   She is overall stable.  Tolerating chemotherapy well.  No numbness or tingling in extremities.  Skin soreness and redness in palms have improved.  No fever or chills.  No nausea or emesis.    Past Medical History, Past Surgical History, Social History, Family History have been reviewed and are without significant changes except as mentioned.      Medications:  The current medication list was reviewed in the EMR    ALLERGIES:    Allergies   Allergen Reactions   • Emend [Fosaprepitant] Shortness Of Breath   • Transderm-Scop [Scopolamine] Dizziness       Objective      Vitals:    05/25/23 0750   BP: 161/80   Pulse: 86   Resp: 18   Temp: 96.4 °F (35.8 °C)   SpO2: 100%             5/11/2023     8:59 AM   Current Status   ECOG score 0       Physical Exam  Vitals and nursing note reviewed.   Constitutional:       Appearance: Normal appearance.   Neurological:      General: No focal deficit present.      Mental Status: She is alert and oriented to person, place, and time. Mental status is at baseline.   Psychiatric:         Mood and Affect: Mood normal.         Behavior: Behavior normal.         Thought Content: Thought content normal.               RECENT LABS:Independently reviewed and summarized  Hematology WBC   Date Value Ref Range Status   05/11/2023 10.84 (H) 3.40 - 10.80 10*3/mm3 Final     RBC   Date Value Ref Range Status   05/11/2023 3.36 (L) 3.77 - 5.28 10*6/mm3 Final     Hemoglobin   Date Value Ref Range Status   05/11/2023 10.7 (L) 12.0 - 15.9 g/dL Final     Hematocrit   Date Value Ref Range Status   05/11/2023 31.0 (L) 34.0 - 46.6 % Final     Platelets   Date Value Ref Range Status   05/11/2023 246 140 - 450 10*3/mm3 Final     WBC   Date Value Ref Range Status   05/25/2023 6.27 3.40 - 10.80 10*3/mm3 Final     RBC   Date Value Ref Range Status   05/25/2023 3.23 (L) 3.77 - 5.28 10*6/mm3 Final     Hemoglobin   Date Value Ref Range Status    05/25/2023 10.3 (L) 12.0 - 15.9 g/dL Final     Hematocrit   Date Value Ref Range Status   05/25/2023 30.4 (L) 34.0 - 46.6 % Final     MCV   Date Value Ref Range Status   05/25/2023 94.1 79.0 - 97.0 fL Final     MCH   Date Value Ref Range Status   05/25/2023 31.9 26.6 - 33.0 pg Final     MCHC   Date Value Ref Range Status   05/25/2023 33.9 31.5 - 35.7 g/dL Final     RDW   Date Value Ref Range Status   05/25/2023 15.7 (H) 12.3 - 15.4 % Final     RDW-SD   Date Value Ref Range Status   05/25/2023 53.8 37.0 - 54.0 fl Final     MPV   Date Value Ref Range Status   05/25/2023 10.3 6.0 - 12.0 fL Final     Platelets   Date Value Ref Range Status   05/25/2023 225 140 - 450 10*3/mm3 Final     Neutrophil %   Date Value Ref Range Status   05/25/2023 67.1 42.7 - 76.0 % Final     Lymphocyte %   Date Value Ref Range Status   05/25/2023 13.6 (L) 19.6 - 45.3 % Final     Monocyte %   Date Value Ref Range Status   05/25/2023 8.6 5.0 - 12.0 % Final     Eosinophil %   Date Value Ref Range Status   05/25/2023 4.8 0.3 - 6.2 % Final     Basophil %   Date Value Ref Range Status   05/25/2023 1.9 (H) 0.0 - 1.5 % Final     Immature Grans %   Date Value Ref Range Status   05/25/2023 4.0 (H) 0.0 - 0.5 % Final     Neutrophils, Absolute   Date Value Ref Range Status   05/25/2023 4.21 1.70 - 7.00 10*3/mm3 Final     Lymphocytes, Absolute   Date Value Ref Range Status   05/25/2023 0.85 0.70 - 3.10 10*3/mm3 Final     Monocytes, Absolute   Date Value Ref Range Status   05/25/2023 0.54 0.10 - 0.90 10*3/mm3 Final     Eosinophils, Absolute   Date Value Ref Range Status   05/25/2023 0.30 0.00 - 0.40 10*3/mm3 Final     Basophils, Absolute   Date Value Ref Range Status   05/25/2023 0.12 0.00 - 0.20 10*3/mm3 Final     Immature Grans, Absolute   Date Value Ref Range Status   05/25/2023 0.25 (H) 0.00 - 0.05 10*3/mm3 Final     nRBC   Date Value Ref Range Status   05/25/2023 0.0 0.0 - 0.2 /100 WBC Final     Lab Results   Component Value Date    GLUCOSE 118 (H)  05/25/2023    BUN 10 05/25/2023    CREATININE 0.58 05/25/2023    EGFR 119.0 05/25/2023    BCR 17.2 05/25/2023    K 4.1 05/25/2023    CO2 26.0 05/25/2023    CALCIUM 9.4 05/25/2023    ALBUMIN 4.3 05/25/2023    BILITOT 0.6 05/25/2023    AST 20 05/25/2023    ALT 33 05/25/2023                Christie Griffith reports a pain score of 0.  Given her pain assessment as noted, treatment options were discussed and the following options were decided upon as a follow-up plan to address the patient's pain: continuation of current treatment plan for pain.    Patient screened negative for depression based on a PHQ-9 score of 0 on 5/25/2023.     Diagnosis:      (1) Right breast cancer   Stage IIB (cT2, cN0, cM0, G3)      ER 20% SD 1%   Poorly differentiated      Current therapy:   Dose dense AC   Cycle 1: 3/2/23   Cycle 2: 3/16/23   Cycle 3: 3/20/23   Cycle 4: 4/13/23      Dose dense paclitaxel   Cycle 1: 4/27/23  Cycle 2: 5/11/23  Cycle 3: 5/25/23     (2) CINV   (3) Peralta plantar erythrodysesthesia       Assessment & Plan     (1) Right breast cancer     Chronic, stable.  She is currently on neoadjuvant dose dense Taxol.  Tolerating chemotherapy well.  Labs look stable.  Cycle 3 dose dense paclitaxel with Neulasta growth factor was signed on today's visit.  I will see her back in 2 weeks with CBC, CMP prior to cycle 4.    (2) CINV     Chronic, stable.  This is improved.  Continue Zofran as needed.    (3) Peralta plantar erythrodysesthesia    Chronic, stable.  This is improved.  Continue urea ointment.  New prescription sent.      5/24/2023      CC:

## 2023-05-25 ENCOUNTER — OFFICE VISIT (OUTPATIENT)
Dept: ONCOLOGY | Facility: CLINIC | Age: 38
End: 2023-05-25
Payer: COMMERCIAL

## 2023-05-25 ENCOUNTER — INFUSION (OUTPATIENT)
Dept: ONCOLOGY | Facility: HOSPITAL | Age: 38
End: 2023-05-25
Payer: COMMERCIAL

## 2023-05-25 ENCOUNTER — PATIENT OUTREACH (OUTPATIENT)
Dept: ONCOLOGY | Facility: HOSPITAL | Age: 38
End: 2023-05-25
Payer: COMMERCIAL

## 2023-05-25 VITALS
SYSTOLIC BLOOD PRESSURE: 161 MMHG | BODY MASS INDEX: 25.22 KG/M2 | OXYGEN SATURATION: 100 % | TEMPERATURE: 96.4 F | WEIGHT: 161 LBS | RESPIRATION RATE: 18 BRPM | HEART RATE: 86 BPM | DIASTOLIC BLOOD PRESSURE: 80 MMHG

## 2023-05-25 DIAGNOSIS — Z45.2 ENCOUNTER FOR VENOUS ACCESS DEVICE CARE: ICD-10-CM

## 2023-05-25 DIAGNOSIS — Z17.0 MALIGNANT NEOPLASM OF UPPER-OUTER QUADRANT OF RIGHT BREAST IN FEMALE, ESTROGEN RECEPTOR POSITIVE: Primary | ICD-10-CM

## 2023-05-25 DIAGNOSIS — Z17.0 MALIGNANT NEOPLASM OF UPPER-OUTER QUADRANT OF RIGHT BREAST IN FEMALE, ESTROGEN RECEPTOR POSITIVE: ICD-10-CM

## 2023-05-25 DIAGNOSIS — C50.411 MALIGNANT NEOPLASM OF UPPER-OUTER QUADRANT OF RIGHT BREAST IN FEMALE, ESTROGEN RECEPTOR POSITIVE: Primary | ICD-10-CM

## 2023-05-25 DIAGNOSIS — R11.2 CINV (CHEMOTHERAPY-INDUCED NAUSEA AND VOMITING): ICD-10-CM

## 2023-05-25 DIAGNOSIS — Z79.899 OTHER LONG TERM (CURRENT) DRUG THERAPY: ICD-10-CM

## 2023-05-25 DIAGNOSIS — L27.1 PALMAR PLANTAR ERYTHRODYSAESTHESIA DUE TO CYTOTOXIC THERAPY: ICD-10-CM

## 2023-05-25 DIAGNOSIS — K12.31 MUCOSITIS DUE TO ANTINEOPLASTIC THERAPY: ICD-10-CM

## 2023-05-25 DIAGNOSIS — T45.1X5A CINV (CHEMOTHERAPY-INDUCED NAUSEA AND VOMITING): ICD-10-CM

## 2023-05-25 DIAGNOSIS — C50.411 MALIGNANT NEOPLASM OF UPPER-OUTER QUADRANT OF RIGHT BREAST IN FEMALE, ESTROGEN RECEPTOR POSITIVE: ICD-10-CM

## 2023-05-25 LAB
ALBUMIN SERPL-MCNC: 4.3 G/DL (ref 3.5–5.2)
ALBUMIN/GLOB SERPL: 1.9 G/DL
ALP SERPL-CCNC: 136 U/L (ref 39–117)
ALT SERPL W P-5'-P-CCNC: 33 U/L (ref 1–33)
ANION GAP SERPL CALCULATED.3IONS-SCNC: 9 MMOL/L (ref 5–15)
AST SERPL-CCNC: 20 U/L (ref 1–32)
BASOPHILS # BLD AUTO: 0.12 10*3/MM3 (ref 0–0.2)
BASOPHILS NFR BLD AUTO: 1.9 % (ref 0–1.5)
BILIRUB SERPL-MCNC: 0.6 MG/DL (ref 0–1.2)
BUN SERPL-MCNC: 10 MG/DL (ref 6–20)
BUN/CREAT SERPL: 17.2 (ref 7–25)
CALCIUM SPEC-SCNC: 9.4 MG/DL (ref 8.6–10.5)
CHLORIDE SERPL-SCNC: 106 MMOL/L (ref 98–107)
CO2 SERPL-SCNC: 26 MMOL/L (ref 22–29)
CREAT SERPL-MCNC: 0.58 MG/DL (ref 0.57–1)
DEPRECATED RDW RBC AUTO: 53.8 FL (ref 37–54)
EGFRCR SERPLBLD CKD-EPI 2021: 119 ML/MIN/1.73
EOSINOPHIL # BLD AUTO: 0.3 10*3/MM3 (ref 0–0.4)
EOSINOPHIL NFR BLD AUTO: 4.8 % (ref 0.3–6.2)
ERYTHROCYTE [DISTWIDTH] IN BLOOD BY AUTOMATED COUNT: 15.7 % (ref 12.3–15.4)
GLOBULIN UR ELPH-MCNC: 2.3 GM/DL
GLUCOSE SERPL-MCNC: 118 MG/DL (ref 65–99)
HCT VFR BLD AUTO: 30.4 % (ref 34–46.6)
HGB BLD-MCNC: 10.3 G/DL (ref 12–15.9)
HOLD SPECIMEN: NORMAL
IMM GRANULOCYTES # BLD AUTO: 0.25 10*3/MM3 (ref 0–0.05)
IMM GRANULOCYTES NFR BLD AUTO: 4 % (ref 0–0.5)
LYMPHOCYTES # BLD AUTO: 0.85 10*3/MM3 (ref 0.7–3.1)
LYMPHOCYTES NFR BLD AUTO: 13.6 % (ref 19.6–45.3)
MCH RBC QN AUTO: 31.9 PG (ref 26.6–33)
MCHC RBC AUTO-ENTMCNC: 33.9 G/DL (ref 31.5–35.7)
MCV RBC AUTO: 94.1 FL (ref 79–97)
MONOCYTES # BLD AUTO: 0.54 10*3/MM3 (ref 0.1–0.9)
MONOCYTES NFR BLD AUTO: 8.6 % (ref 5–12)
NEUTROPHILS NFR BLD AUTO: 4.21 10*3/MM3 (ref 1.7–7)
NEUTROPHILS NFR BLD AUTO: 67.1 % (ref 42.7–76)
NRBC BLD AUTO-RTO: 0 /100 WBC (ref 0–0.2)
PLATELET # BLD AUTO: 225 10*3/MM3 (ref 140–450)
PMV BLD AUTO: 10.3 FL (ref 6–12)
POTASSIUM SERPL-SCNC: 4.1 MMOL/L (ref 3.5–5.2)
PROT SERPL-MCNC: 6.6 G/DL (ref 6–8.5)
RBC # BLD AUTO: 3.23 10*6/MM3 (ref 3.77–5.28)
SODIUM SERPL-SCNC: 141 MMOL/L (ref 136–145)
WBC NRBC COR # BLD: 6.27 10*3/MM3 (ref 3.4–10.8)

## 2023-05-25 PROCEDURE — 25010000002 PACLITAXEL PER 1 MG: Performed by: INTERNAL MEDICINE

## 2023-05-25 PROCEDURE — 25010000002 DIPHENHYDRAMINE PER 50 MG: Performed by: INTERNAL MEDICINE

## 2023-05-25 PROCEDURE — 85025 COMPLETE CBC W/AUTO DIFF WBC: CPT

## 2023-05-25 PROCEDURE — 80053 COMPREHEN METABOLIC PANEL: CPT

## 2023-05-25 PROCEDURE — 25010000002 ONDANSETRON PER 1 MG: Performed by: INTERNAL MEDICINE

## 2023-05-25 PROCEDURE — 25010000002 DEXAMETHASONE SODIUM PHOSPHATE 100 MG/10ML SOLUTION: Performed by: INTERNAL MEDICINE

## 2023-05-25 PROCEDURE — 25010000002 HEPARIN LOCK FLUSH PER 10 UNITS: Performed by: INTERNAL MEDICINE

## 2023-05-25 PROCEDURE — 36415 COLL VENOUS BLD VENIPUNCTURE: CPT

## 2023-05-25 RX ORDER — SODIUM CHLORIDE 9 MG/ML
250 INJECTION, SOLUTION INTRAVENOUS ONCE
Status: COMPLETED | OUTPATIENT
Start: 2023-05-25 | End: 2023-05-25

## 2023-05-25 RX ORDER — SODIUM CHLORIDE 0.9 % (FLUSH) 0.9 %
10 SYRINGE (ML) INJECTION AS NEEDED
OUTPATIENT
Start: 2023-05-25

## 2023-05-25 RX ORDER — HEPARIN SODIUM (PORCINE) LOCK FLUSH IV SOLN 100 UNIT/ML 100 UNIT/ML
500 SOLUTION INTRAVENOUS AS NEEDED
Status: DISCONTINUED | OUTPATIENT
Start: 2023-05-25 | End: 2023-05-25 | Stop reason: HOSPADM

## 2023-05-25 RX ORDER — HEPARIN SODIUM (PORCINE) LOCK FLUSH IV SOLN 100 UNIT/ML 100 UNIT/ML
500 SOLUTION INTRAVENOUS AS NEEDED
OUTPATIENT
Start: 2023-05-25

## 2023-05-25 RX ORDER — FAMOTIDINE 10 MG/ML
20 INJECTION, SOLUTION INTRAVENOUS ONCE
Status: COMPLETED | OUTPATIENT
Start: 2023-05-25 | End: 2023-05-25

## 2023-05-25 RX ORDER — FAMOTIDINE 10 MG/ML
20 INJECTION, SOLUTION INTRAVENOUS ONCE
Status: CANCELLED | OUTPATIENT
Start: 2023-05-25

## 2023-05-25 RX ORDER — SODIUM CHLORIDE 0.9 % (FLUSH) 0.9 %
10 SYRINGE (ML) INJECTION AS NEEDED
Status: DISCONTINUED | OUTPATIENT
Start: 2023-05-25 | End: 2023-05-25 | Stop reason: HOSPADM

## 2023-05-25 RX ORDER — DIPHENHYDRAMINE HYDROCHLORIDE 50 MG/ML
50 INJECTION INTRAMUSCULAR; INTRAVENOUS AS NEEDED
Status: CANCELLED | OUTPATIENT
Start: 2023-05-25

## 2023-05-25 RX ORDER — FAMOTIDINE 10 MG/ML
20 INJECTION, SOLUTION INTRAVENOUS AS NEEDED
Status: CANCELLED | OUTPATIENT
Start: 2023-05-25

## 2023-05-25 RX ORDER — ONDANSETRON HCL IN 0.9 % NACL 8 MG/50 ML
8 INTRAVENOUS SOLUTION, PIGGYBACK (ML) INTRAVENOUS ONCE
Status: COMPLETED | OUTPATIENT
Start: 2023-05-25 | End: 2023-05-25

## 2023-05-25 RX ORDER — DIPHENHYDRAMINE HYDROCHLORIDE 50 MG/ML
50 INJECTION INTRAMUSCULAR; INTRAVENOUS AS NEEDED
Status: DISCONTINUED | OUTPATIENT
Start: 2023-05-25 | End: 2023-05-25 | Stop reason: HOSPADM

## 2023-05-25 RX ORDER — FAMOTIDINE 10 MG/ML
20 INJECTION, SOLUTION INTRAVENOUS AS NEEDED
Status: DISCONTINUED | OUTPATIENT
Start: 2023-05-25 | End: 2023-05-25 | Stop reason: HOSPADM

## 2023-05-25 RX ORDER — SODIUM CHLORIDE 9 MG/ML
250 INJECTION, SOLUTION INTRAVENOUS ONCE
Status: CANCELLED | OUTPATIENT
Start: 2023-05-25

## 2023-05-25 RX ADMIN — PACLITAXEL 320 MG: 6 INJECTION, SOLUTION INTRAVENOUS at 10:10

## 2023-05-25 RX ADMIN — DIPHENHYDRAMINE HYDROCHLORIDE 50 MG: 50 INJECTION, SOLUTION INTRAMUSCULAR; INTRAVENOUS at 09:06

## 2023-05-25 RX ADMIN — DEXAMETHASONE SODIUM PHOSPHATE 20 MG: 10 INJECTION, SOLUTION INTRAMUSCULAR; INTRAVENOUS at 09:35

## 2023-05-25 RX ADMIN — SODIUM CHLORIDE 250 ML: 9 INJECTION, SOLUTION INTRAVENOUS at 08:20

## 2023-05-25 RX ADMIN — HEPARIN 500 UNITS: 100 SYRINGE at 13:06

## 2023-05-25 RX ADMIN — Medication 10 ML: at 13:06

## 2023-05-25 RX ADMIN — FAMOTIDINE 20 MG: 10 INJECTION INTRAVENOUS at 08:20

## 2023-05-25 RX ADMIN — ONDANSETRON 8 MG: 2 INJECTION INTRAMUSCULAR; INTRAVENOUS at 08:36

## 2023-05-25 NOTE — SIGNIFICANT NOTE
PT requested appt with Dr Christie Aiken for surgery second opinion. RN coordinated appt. Pt provided with appt date/time. PT verbalizes understanding and denies any further questions.

## 2023-05-26 ENCOUNTER — INFUSION (OUTPATIENT)
Dept: ONCOLOGY | Facility: HOSPITAL | Age: 38
End: 2023-05-26
Payer: COMMERCIAL

## 2023-05-26 DIAGNOSIS — Z17.0 MALIGNANT NEOPLASM OF UPPER-OUTER QUADRANT OF RIGHT BREAST IN FEMALE, ESTROGEN RECEPTOR POSITIVE: Primary | ICD-10-CM

## 2023-05-26 DIAGNOSIS — C50.411 MALIGNANT NEOPLASM OF UPPER-OUTER QUADRANT OF RIGHT BREAST IN FEMALE, ESTROGEN RECEPTOR POSITIVE: Primary | ICD-10-CM

## 2023-05-26 PROCEDURE — 25010000002 PEGFILGRASTIM-CBQV 6 MG/0.6ML SOLUTION PREFILLED SYRINGE: Performed by: INTERNAL MEDICINE

## 2023-05-26 RX ADMIN — PEGFILGRASTIM-CBQV 6 MG: 6 INJECTION, SOLUTION SUBCUTANEOUS at 14:05

## 2023-06-02 DIAGNOSIS — F90.2 ATTENTION DEFICIT HYPERACTIVITY DISORDER, COMBINED TYPE: ICD-10-CM

## 2023-06-04 RX ORDER — DEXTROAMPHETAMINE SACCHARATE, AMPHETAMINE ASPARTATE MONOHYDRATE, DEXTROAMPHETAMINE SULFATE AND AMPHETAMINE SULFATE 5; 5; 5; 5 MG/1; MG/1; MG/1; MG/1
20 CAPSULE, EXTENDED RELEASE ORAL EVERY MORNING
Qty: 30 CAPSULE | Refills: 0 | Status: SHIPPED | OUTPATIENT
Start: 2023-06-04

## 2023-06-07 NOTE — PROGRESS NOTES
Subjective     Christie Griffith was seen in follow up for breast cancer.   She is overall doing well.  Tolerating chemotherapy well.  No new issues.  Mild numbness in upper and lower extremity.  Does not interfere with day-to-day activities.  Not painful.    Past Medical History, Past Surgical History, Social History, Family History have been reviewed and are without significant changes except as mentioned.        Medications:  The current medication list was reviewed in the EMR    ALLERGIES:    Allergies   Allergen Reactions    Emend [Fosaprepitant] Shortness Of Breath    Transderm-Scop [Scopolamine] Dizziness       Objective      Vitals:    06/08/23 0803   BP: 153/97   Pulse: 120   Resp: 18   Temp: 96.3 °F (35.7 °C)   SpO2: 96%             5/11/2023     8:59 AM   Current Status   ECOG score 0       Physical Exam  Vitals and nursing note reviewed.   Constitutional:       Appearance: Normal appearance.   Neurological:      General: No focal deficit present.      Mental Status: She is alert and oriented to person, place, and time. Mental status is at baseline.   Psychiatric:         Mood and Affect: Mood normal.         Behavior: Behavior normal.         Thought Content: Thought content normal.         RECENT LABS:Independently reviewed and summarized  Hematology WBC   Date Value Ref Range Status   05/25/2023 6.27 3.40 - 10.80 10*3/mm3 Final     RBC   Date Value Ref Range Status   05/25/2023 3.23 (L) 3.77 - 5.28 10*6/mm3 Final     Hemoglobin   Date Value Ref Range Status   05/25/2023 10.3 (L) 12.0 - 15.9 g/dL Final     Hematocrit   Date Value Ref Range Status   05/25/2023 30.4 (L) 34.0 - 46.6 % Final     Platelets   Date Value Ref Range Status   05/25/2023 225 140 - 450 10*3/mm3 Final     WBC   Date Value Ref Range Status   06/08/2023 6.72 3.40 - 10.80 10*3/mm3 Final     RBC   Date Value Ref Range Status   06/08/2023 3.85 3.77 - 5.28 10*6/mm3 Final     Hemoglobin   Date Value Ref Range Status   06/08/2023 12.0 12.0  - 15.9 g/dL Final     Hematocrit   Date Value Ref Range Status   06/08/2023 35.9 34.0 - 46.6 % Final     MCV   Date Value Ref Range Status   06/08/2023 93.2 79.0 - 97.0 fL Final     MCH   Date Value Ref Range Status   06/08/2023 31.2 26.6 - 33.0 pg Final     MCHC   Date Value Ref Range Status   06/08/2023 33.4 31.5 - 35.7 g/dL Final     RDW   Date Value Ref Range Status   06/08/2023 14.0 12.3 - 15.4 % Final     RDW-SD   Date Value Ref Range Status   06/08/2023 47.2 37.0 - 54.0 fl Final     MPV   Date Value Ref Range Status   06/08/2023 10.3 6.0 - 12.0 fL Final     Platelets   Date Value Ref Range Status   06/08/2023 273 140 - 450 10*3/mm3 Final     Neutrophil %   Date Value Ref Range Status   06/08/2023 69.2 42.7 - 76.0 % Final     Lymphocyte %   Date Value Ref Range Status   06/08/2023 15.8 (L) 19.6 - 45.3 % Final     Monocyte %   Date Value Ref Range Status   06/08/2023 8.2 5.0 - 12.0 % Final     Eosinophil %   Date Value Ref Range Status   06/08/2023 2.8 0.3 - 6.2 % Final     Basophil %   Date Value Ref Range Status   06/08/2023 1.5 0.0 - 1.5 % Final     Immature Grans %   Date Value Ref Range Status   06/08/2023 2.5 (H) 0.0 - 0.5 % Final     Neutrophils, Absolute   Date Value Ref Range Status   06/08/2023 4.65 1.70 - 7.00 10*3/mm3 Final     Lymphocytes, Absolute   Date Value Ref Range Status   06/08/2023 1.06 0.70 - 3.10 10*3/mm3 Final     Monocytes, Absolute   Date Value Ref Range Status   06/08/2023 0.55 0.10 - 0.90 10*3/mm3 Final     Eosinophils, Absolute   Date Value Ref Range Status   06/08/2023 0.19 0.00 - 0.40 10*3/mm3 Final     Basophils, Absolute   Date Value Ref Range Status   06/08/2023 0.10 0.00 - 0.20 10*3/mm3 Final     Immature Grans, Absolute   Date Value Ref Range Status   06/08/2023 0.17 (H) 0.00 - 0.05 10*3/mm3 Final     nRBC   Date Value Ref Range Status   06/08/2023 0.0 0.0 - 0.2 /100 WBC Final       Lab Results   Component Value Date    GLUCOSE 113 (H) 06/08/2023    BUN 13 06/08/2023     CREATININE 0.65 06/08/2023    EGFR 115.7 06/08/2023    BCR 20.0 06/08/2023    K 4.1 06/08/2023    CO2 24.0 06/08/2023    CALCIUM 9.6 06/08/2023    ALBUMIN 4.3 06/08/2023    BILITOT 0.5 06/08/2023    AST 19 06/08/2023    ALT 28 06/08/2023            Christie BARNES Gladis reports a pain score of 0.  Given her pain assessment as noted, treatment options were discussed and the following options were decided upon as a follow-up plan to address the patient's pain: continuation of current treatment plan for pain.    Patient screened negative for depression based on a PHQ-9 score of 0 on 6/8/2023.           Diagnosis:      (1) Right breast cancer   Stage IIB (cT2, cN0, cM0, G3)      ER 20% UT 1%   Poorly differentiated      Current therapy:   Dose dense AC   Cycle 1: 3/2/23   Cycle 2: 3/16/23   Cycle 3: 3/20/23   Cycle 4: 4/13/23      Dose dense paclitaxel   Cycle 1: 4/27/23  Cycle 2: 5/11/23  Cycle 3: 5/25/23  Cycle 4: 6/8/23      (2) CINV   (3) Peralta plantar erythrodysesthesia    Assessment & Plan     (1) Right breast cancer      Chronic, stable.  She is currently on neoadjuvant dose dense Taxol.  Tolerating chemotherapy well.  Grade 1 peripheral neuropathy in upper and lower extremity.  Does not interfere with day-to-day activities.  Labs look stable.  Cycle 4 dose dense paclitaxel with Neulasta growth factor was signed on today's visit.  She is seeing breast surgeon in [place] next week.  I will see her back in 2 weeks with CBC, CMP.    (2) CINV     Chronic, stable.  Continue Zofran as needed.    (3) Peralta plantar erythrodysesthesia    Chronic, stable.  Continue to improve.  Continue urea ointment especially on feet.    6/7/2023      CC:

## 2023-06-08 ENCOUNTER — INFUSION (OUTPATIENT)
Dept: ONCOLOGY | Facility: HOSPITAL | Age: 38
End: 2023-06-08
Payer: COMMERCIAL

## 2023-06-08 ENCOUNTER — OFFICE VISIT (OUTPATIENT)
Dept: ONCOLOGY | Facility: CLINIC | Age: 38
End: 2023-06-08
Payer: COMMERCIAL

## 2023-06-08 VITALS
HEART RATE: 120 BPM | SYSTOLIC BLOOD PRESSURE: 153 MMHG | OXYGEN SATURATION: 96 % | RESPIRATION RATE: 18 BRPM | TEMPERATURE: 96.3 F | WEIGHT: 161 LBS | DIASTOLIC BLOOD PRESSURE: 97 MMHG | BODY MASS INDEX: 25.22 KG/M2

## 2023-06-08 DIAGNOSIS — Z79.899 OTHER LONG TERM (CURRENT) DRUG THERAPY: ICD-10-CM

## 2023-06-08 DIAGNOSIS — Z17.0 MALIGNANT NEOPLASM OF UPPER-OUTER QUADRANT OF RIGHT BREAST IN FEMALE, ESTROGEN RECEPTOR POSITIVE: Primary | ICD-10-CM

## 2023-06-08 DIAGNOSIS — C50.411 MALIGNANT NEOPLASM OF UPPER-OUTER QUADRANT OF RIGHT BREAST IN FEMALE, ESTROGEN RECEPTOR POSITIVE: ICD-10-CM

## 2023-06-08 DIAGNOSIS — C50.411 MALIGNANT NEOPLASM OF UPPER-OUTER QUADRANT OF RIGHT BREAST IN FEMALE, ESTROGEN RECEPTOR POSITIVE: Primary | ICD-10-CM

## 2023-06-08 DIAGNOSIS — L27.1 PALMAR PLANTAR ERYTHRODYSAESTHESIA DUE TO CYTOTOXIC THERAPY: ICD-10-CM

## 2023-06-08 DIAGNOSIS — Z45.2 ENCOUNTER FOR VENOUS ACCESS DEVICE CARE: ICD-10-CM

## 2023-06-08 DIAGNOSIS — Z17.0 MALIGNANT NEOPLASM OF UPPER-OUTER QUADRANT OF RIGHT BREAST IN FEMALE, ESTROGEN RECEPTOR POSITIVE: ICD-10-CM

## 2023-06-08 LAB
ALBUMIN SERPL-MCNC: 4.3 G/DL (ref 3.5–5.2)
ALBUMIN/GLOB SERPL: 1.7 G/DL
ALP SERPL-CCNC: 139 U/L (ref 39–117)
ALT SERPL W P-5'-P-CCNC: 28 U/L (ref 1–33)
ANION GAP SERPL CALCULATED.3IONS-SCNC: 10 MMOL/L (ref 5–15)
AST SERPL-CCNC: 19 U/L (ref 1–32)
BASOPHILS # BLD AUTO: 0.1 10*3/MM3 (ref 0–0.2)
BASOPHILS NFR BLD AUTO: 1.5 % (ref 0–1.5)
BILIRUB SERPL-MCNC: 0.5 MG/DL (ref 0–1.2)
BUN SERPL-MCNC: 13 MG/DL (ref 6–20)
BUN/CREAT SERPL: 20 (ref 7–25)
CALCIUM SPEC-SCNC: 9.6 MG/DL (ref 8.6–10.5)
CHLORIDE SERPL-SCNC: 107 MMOL/L (ref 98–107)
CO2 SERPL-SCNC: 24 MMOL/L (ref 22–29)
CREAT SERPL-MCNC: 0.65 MG/DL (ref 0.57–1)
DEPRECATED RDW RBC AUTO: 47.2 FL (ref 37–54)
EGFRCR SERPLBLD CKD-EPI 2021: 115.7 ML/MIN/1.73
EOSINOPHIL # BLD AUTO: 0.19 10*3/MM3 (ref 0–0.4)
EOSINOPHIL NFR BLD AUTO: 2.8 % (ref 0.3–6.2)
ERYTHROCYTE [DISTWIDTH] IN BLOOD BY AUTOMATED COUNT: 14 % (ref 12.3–15.4)
GLOBULIN UR ELPH-MCNC: 2.5 GM/DL
GLUCOSE SERPL-MCNC: 113 MG/DL (ref 65–99)
HCT VFR BLD AUTO: 35.9 % (ref 34–46.6)
HGB BLD-MCNC: 12 G/DL (ref 12–15.9)
HOLD SPECIMEN: NORMAL
IMM GRANULOCYTES # BLD AUTO: 0.17 10*3/MM3 (ref 0–0.05)
IMM GRANULOCYTES NFR BLD AUTO: 2.5 % (ref 0–0.5)
LYMPHOCYTES # BLD AUTO: 1.06 10*3/MM3 (ref 0.7–3.1)
LYMPHOCYTES NFR BLD AUTO: 15.8 % (ref 19.6–45.3)
MCH RBC QN AUTO: 31.2 PG (ref 26.6–33)
MCHC RBC AUTO-ENTMCNC: 33.4 G/DL (ref 31.5–35.7)
MCV RBC AUTO: 93.2 FL (ref 79–97)
MONOCYTES # BLD AUTO: 0.55 10*3/MM3 (ref 0.1–0.9)
MONOCYTES NFR BLD AUTO: 8.2 % (ref 5–12)
NEUTROPHILS NFR BLD AUTO: 4.65 10*3/MM3 (ref 1.7–7)
NEUTROPHILS NFR BLD AUTO: 69.2 % (ref 42.7–76)
NRBC BLD AUTO-RTO: 0 /100 WBC (ref 0–0.2)
PLATELET # BLD AUTO: 273 10*3/MM3 (ref 140–450)
PMV BLD AUTO: 10.3 FL (ref 6–12)
POTASSIUM SERPL-SCNC: 4.1 MMOL/L (ref 3.5–5.2)
PROT SERPL-MCNC: 6.8 G/DL (ref 6–8.5)
RBC # BLD AUTO: 3.85 10*6/MM3 (ref 3.77–5.28)
SODIUM SERPL-SCNC: 141 MMOL/L (ref 136–145)
WBC NRBC COR # BLD: 6.72 10*3/MM3 (ref 3.4–10.8)

## 2023-06-08 PROCEDURE — 25010000002 ONDANSETRON PER 1 MG: Performed by: INTERNAL MEDICINE

## 2023-06-08 PROCEDURE — 36415 COLL VENOUS BLD VENIPUNCTURE: CPT

## 2023-06-08 PROCEDURE — 25010000002 DEXAMETHASONE SODIUM PHOSPHATE 100 MG/10ML SOLUTION: Performed by: INTERNAL MEDICINE

## 2023-06-08 PROCEDURE — 25010000002 DIPHENHYDRAMINE PER 50 MG: Performed by: INTERNAL MEDICINE

## 2023-06-08 PROCEDURE — 80053 COMPREHEN METABOLIC PANEL: CPT

## 2023-06-08 PROCEDURE — 85025 COMPLETE CBC W/AUTO DIFF WBC: CPT

## 2023-06-08 PROCEDURE — 25010000002 PACLITAXEL PER 1 MG: Performed by: INTERNAL MEDICINE

## 2023-06-08 RX ORDER — FAMOTIDINE 10 MG/ML
20 INJECTION, SOLUTION INTRAVENOUS ONCE
Status: COMPLETED | OUTPATIENT
Start: 2023-06-08 | End: 2023-06-08

## 2023-06-08 RX ORDER — SODIUM CHLORIDE 9 MG/ML
250 INJECTION, SOLUTION INTRAVENOUS ONCE
Status: CANCELLED | OUTPATIENT
Start: 2023-06-08

## 2023-06-08 RX ORDER — HEPARIN SODIUM (PORCINE) LOCK FLUSH IV SOLN 100 UNIT/ML 100 UNIT/ML
500 SOLUTION INTRAVENOUS AS NEEDED
Status: DISCONTINUED | OUTPATIENT
Start: 2023-06-08 | End: 2023-06-08 | Stop reason: HOSPADM

## 2023-06-08 RX ORDER — SODIUM CHLORIDE 0.9 % (FLUSH) 0.9 %
10 SYRINGE (ML) INJECTION AS NEEDED
OUTPATIENT
Start: 2023-06-08

## 2023-06-08 RX ORDER — DIPHENHYDRAMINE HYDROCHLORIDE 50 MG/ML
50 INJECTION INTRAMUSCULAR; INTRAVENOUS AS NEEDED
Status: CANCELLED | OUTPATIENT
Start: 2023-06-08

## 2023-06-08 RX ORDER — ONDANSETRON 2 MG/ML
8 INJECTION INTRAMUSCULAR; INTRAVENOUS ONCE
Status: CANCELLED
Start: 2023-06-08 | End: 2023-06-08

## 2023-06-08 RX ORDER — FAMOTIDINE 10 MG/ML
20 INJECTION, SOLUTION INTRAVENOUS AS NEEDED
Status: CANCELLED | OUTPATIENT
Start: 2023-06-08

## 2023-06-08 RX ORDER — HEPARIN SODIUM (PORCINE) LOCK FLUSH IV SOLN 100 UNIT/ML 100 UNIT/ML
500 SOLUTION INTRAVENOUS AS NEEDED
OUTPATIENT
Start: 2023-06-08

## 2023-06-08 RX ORDER — FAMOTIDINE 10 MG/ML
20 INJECTION, SOLUTION INTRAVENOUS AS NEEDED
Status: DISCONTINUED | OUTPATIENT
Start: 2023-06-08 | End: 2023-06-08 | Stop reason: HOSPADM

## 2023-06-08 RX ORDER — SODIUM CHLORIDE 9 MG/ML
250 INJECTION, SOLUTION INTRAVENOUS ONCE
Status: COMPLETED | OUTPATIENT
Start: 2023-06-08 | End: 2023-06-08

## 2023-06-08 RX ORDER — ONDANSETRON HCL IN 0.9 % NACL 8 MG/50 ML
8 INTRAVENOUS SOLUTION, PIGGYBACK (ML) INTRAVENOUS ONCE
Status: COMPLETED | OUTPATIENT
Start: 2023-06-08 | End: 2023-06-08

## 2023-06-08 RX ORDER — FAMOTIDINE 10 MG/ML
20 INJECTION, SOLUTION INTRAVENOUS ONCE
Status: CANCELLED | OUTPATIENT
Start: 2023-06-08

## 2023-06-08 RX ORDER — DIPHENHYDRAMINE HYDROCHLORIDE 50 MG/ML
50 INJECTION INTRAMUSCULAR; INTRAVENOUS AS NEEDED
Status: DISCONTINUED | OUTPATIENT
Start: 2023-06-08 | End: 2023-06-08 | Stop reason: HOSPADM

## 2023-06-08 RX ORDER — SODIUM CHLORIDE 0.9 % (FLUSH) 0.9 %
10 SYRINGE (ML) INJECTION AS NEEDED
Status: DISCONTINUED | OUTPATIENT
Start: 2023-06-08 | End: 2023-06-08 | Stop reason: HOSPADM

## 2023-06-08 RX ADMIN — FAMOTIDINE 20 MG: 10 INJECTION INTRAVENOUS at 09:05

## 2023-06-08 RX ADMIN — HEPARIN SODIUM (PORCINE) LOCK FLUSH IV SOLN 100 UNIT/ML 500 UNITS: 100 SOLUTION at 14:11

## 2023-06-08 RX ADMIN — Medication 10 ML: at 14:11

## 2023-06-08 RX ADMIN — ONDANSETRON 8 MG: 2 INJECTION INTRAMUSCULAR; INTRAVENOUS at 09:32

## 2023-06-08 RX ADMIN — PACLITAXEL 320 MG: 6 INJECTION, SOLUTION, CONCENTRATE INTRAVENOUS at 11:14

## 2023-06-08 RX ADMIN — SODIUM CHLORIDE 250 ML: 9 INJECTION, SOLUTION INTRAVENOUS at 09:32

## 2023-06-08 RX ADMIN — DIPHENHYDRAMINE HYDROCHLORIDE 50 MG: 50 INJECTION, SOLUTION INTRAMUSCULAR; INTRAVENOUS at 10:01

## 2023-06-08 RX ADMIN — DEXAMETHASONE SODIUM PHOSPHATE 20 MG: 10 INJECTION, SOLUTION INTRAMUSCULAR; INTRAVENOUS at 10:38

## 2023-06-09 ENCOUNTER — INFUSION (OUTPATIENT)
Dept: ONCOLOGY | Facility: HOSPITAL | Age: 38
End: 2023-06-09
Payer: COMMERCIAL

## 2023-06-09 VITALS
HEART RATE: 112 BPM | SYSTOLIC BLOOD PRESSURE: 160 MMHG | RESPIRATION RATE: 18 BRPM | DIASTOLIC BLOOD PRESSURE: 96 MMHG | TEMPERATURE: 97.3 F

## 2023-06-09 DIAGNOSIS — C50.411 MALIGNANT NEOPLASM OF UPPER-OUTER QUADRANT OF RIGHT BREAST IN FEMALE, ESTROGEN RECEPTOR POSITIVE: Primary | ICD-10-CM

## 2023-06-09 DIAGNOSIS — Z17.0 MALIGNANT NEOPLASM OF UPPER-OUTER QUADRANT OF RIGHT BREAST IN FEMALE, ESTROGEN RECEPTOR POSITIVE: Primary | ICD-10-CM

## 2023-06-09 PROCEDURE — 25010000002 PEGFILGRASTIM-CBQV 6 MG/0.6ML SOLUTION PREFILLED SYRINGE: Performed by: INTERNAL MEDICINE

## 2023-06-09 RX ADMIN — PEGFILGRASTIM-CBQV 6 MG: 6 INJECTION, SOLUTION SUBCUTANEOUS at 14:01

## 2023-08-09 ENCOUNTER — OFFICE VISIT (OUTPATIENT)
Dept: ONCOLOGY | Facility: CLINIC | Age: 38
End: 2023-08-09
Payer: COMMERCIAL

## 2023-08-09 VITALS
RESPIRATION RATE: 18 BRPM | WEIGHT: 163 LBS | OXYGEN SATURATION: 96 % | DIASTOLIC BLOOD PRESSURE: 78 MMHG | SYSTOLIC BLOOD PRESSURE: 166 MMHG | BODY MASS INDEX: 25.53 KG/M2 | HEART RATE: 98 BPM

## 2023-08-09 DIAGNOSIS — C50.411 MALIGNANT NEOPLASM OF UPPER-OUTER QUADRANT OF RIGHT BREAST IN FEMALE, ESTROGEN RECEPTOR POSITIVE: Primary | ICD-10-CM

## 2023-08-09 DIAGNOSIS — F41.9 ANXIETY: ICD-10-CM

## 2023-08-09 DIAGNOSIS — Z17.0 MALIGNANT NEOPLASM OF UPPER-OUTER QUADRANT OF RIGHT BREAST IN FEMALE, ESTROGEN RECEPTOR POSITIVE: Primary | ICD-10-CM

## 2023-08-09 RX ORDER — EXEMESTANE 25 MG/1
25 TABLET ORAL DAILY
Qty: 90 TABLET | Refills: 0 | Status: SHIPPED | OUTPATIENT
Start: 2023-08-09

## 2023-08-09 RX ORDER — ALPRAZOLAM 1 MG/1
1 TABLET ORAL AS NEEDED
Qty: 2 TABLET | Refills: 0 | Status: SHIPPED | OUTPATIENT
Start: 2023-08-09

## 2023-08-11 ENCOUNTER — PROCEDURE VISIT (OUTPATIENT)
Dept: SURGERY | Facility: CLINIC | Age: 38
End: 2023-08-11
Payer: COMMERCIAL

## 2023-08-11 VITALS
TEMPERATURE: 96.6 F | SYSTOLIC BLOOD PRESSURE: 142 MMHG | HEIGHT: 67 IN | BODY MASS INDEX: 25.74 KG/M2 | WEIGHT: 164 LBS | DIASTOLIC BLOOD PRESSURE: 68 MMHG | HEART RATE: 90 BPM

## 2023-08-11 DIAGNOSIS — Z45.2 ENCOUNTER FOR VENOUS ACCESS DEVICE CARE: Primary | ICD-10-CM

## 2023-08-11 RX ORDER — HYDROCODONE BITARTRATE AND ACETAMINOPHEN 5; 325 MG/1; MG/1
1 TABLET ORAL EVERY 6 HOURS PRN
COMMUNITY
Start: 2023-07-26

## 2023-08-11 NOTE — PROGRESS NOTES
38-year-old female who has undergone bilateral mastectomies for breast cancer.  She presents now to have her left IJ Mediport removed.  She sent by her medical oncologist Dr. Nicole.  Patient wishes to do this here in the office under local.  We fully discussed the procedure alternatives risk and benefits and she clearly understands and wishes to proceed    She was placed supine the left upper chest was prepped and draped in normal sterile fashion.  Injected local good block was obtained skin incision was made through the previous scar sharply down to the port.  The port was brought up into the wound and the 2 Prolene sutures were removed.  Port and catheter was removed as 1 piece without any difficulty.  Tunnel was closed with 4-0 Monocryl figure-of-eight stitch.  Skin was then closed with running 4 Monocryl subcuticular stitch glue was used to final skin closure.  Patient was instructed local wound care.  She will follow-up with Dr. Nicole as planned and she will follow-up with us on appearing basis

## 2023-08-13 NOTE — PROGRESS NOTES
Subjective     Christie Griffith was seen in follow up for breast cancer.   Underwent mastectomy.   Feels well.   Path result reviewed.   Discussed treatment options at length.     Past Medical History, Past Surgical History, Social History, Family History have been reviewed and are without significant changes except as mentioned.        Medications:  The current medication list was reviewed in the EMR    ALLERGIES:    Allergies   Allergen Reactions    Emend [Fosaprepitant] Shortness Of Breath    Transderm-Scop [Scopolamine] Dizziness       Objective      Vitals:    08/09/23 1510   BP: 166/78   Pulse: 98   Resp: 18   SpO2: 96%   Weight: 73.9 kg (163 lb)   PainSc: 0-No pain         6/9/2023     2:04 PM   Current Status   ECOG score 0       Physical Exam  Vitals and nursing note reviewed.   Constitutional:       Appearance: Normal appearance.   Neurological:      General: No focal deficit present.      Mental Status: She is alert and oriented to person, place, and time. Mental status is at baseline.   Psychiatric:         Mood and Affect: Mood normal.         Behavior: Behavior normal.         Thought Content: Thought content normal.             RECENT LABS:Independently reviewed and summarized  Hematology WBC   Date Value Ref Range Status   06/26/2023 6.43 3.40 - 10.80 10*3/mm3 Final     RBC   Date Value Ref Range Status   06/26/2023 3.73 (L) 3.77 - 5.28 10*6/mm3 Final     Hemoglobin   Date Value Ref Range Status   06/26/2023 11.9 (L) 12.0 - 15.9 g/dL Final     Hematocrit   Date Value Ref Range Status   06/26/2023 34.0 34.0 - 46.6 % Final     Platelets   Date Value Ref Range Status   06/26/2023 266 140 - 450 10*3/mm3 Final              Pathology (Result reviewed):           Christie Griffith reports a pain score of 0.  Given her pain assessment as noted, treatment options were discussed and the following options were decided upon as a follow-up plan to address the patient's pain: continuation of current treatment plan  for pain.    Patient screened negative for depression based on a PHQ-9 score of 0 on 8/9/2023.    Diagnosis:     (1) Right breast cancer   Stage IIB (cT2, cN0, cM0, G3)      ER 20% NH 1%   Poorly differentiated      Prior  therapy:   Neoadjuvant   Dose dense AC   Cycle 1: 3/2/23   Cycle 2: 3/16/23   Cycle 3: 3/20/23   Cycle 4: 4/13/23      Dose dense paclitaxel   Cycle 1: 4/27/23  Cycle 2: 5/11/23  Cycle 3: 5/25/23  Cycle 4: 6/8/23     Bilateral mastectomy  and SLNB (7/26/23)   Pathological CR.     (2) Anxiety       Assessment & Plan     (1) Right breast cancer     S/p bilateral mastectomy.      Pathological CR.   No role of ajduvant RT.   She pre-menopausal.   Discussed adjuvant hormonal therapy options - Tamoxifen versus OS with tamoxifen versus OS with AI discussed.   Varius methods of OS - surgical versus lupron discussed.   Side effects discussed at length.   Risks versus benefits  discussed.   She has opted for lupron with exemestane.   I will arrange for lupron injection.   New prescription of exemestane sent.   Recommend calcium and vitamin D  supplements.   CBC,  CMP, lupron in 12 weeks.      (2) Anxiety     Patient quite anxious about port removal.   Discussed this is simple outpatient procedure.   She very anxious about this.   I will give her xanax to assist with situation anxiety.   New prescription sent.   Will refer to Dr Charles for port removal.       8/12/2023      CC:

## 2023-08-16 ENCOUNTER — TELEPHONE (OUTPATIENT)
Dept: ONCOLOGY | Facility: CLINIC | Age: 38
End: 2023-08-16
Payer: COMMERCIAL

## 2023-08-16 ENCOUNTER — INFUSION (OUTPATIENT)
Dept: ONCOLOGY | Facility: HOSPITAL | Age: 38
End: 2023-08-16
Payer: COMMERCIAL

## 2023-08-16 VITALS
SYSTOLIC BLOOD PRESSURE: 131 MMHG | DIASTOLIC BLOOD PRESSURE: 86 MMHG | HEART RATE: 101 BPM | RESPIRATION RATE: 18 BRPM | TEMPERATURE: 96.9 F

## 2023-08-16 DIAGNOSIS — Z17.0 MALIGNANT NEOPLASM OF UPPER-OUTER QUADRANT OF RIGHT BREAST IN FEMALE, ESTROGEN RECEPTOR POSITIVE: Primary | ICD-10-CM

## 2023-08-16 DIAGNOSIS — C50.411 MALIGNANT NEOPLASM OF UPPER-OUTER QUADRANT OF RIGHT BREAST IN FEMALE, ESTROGEN RECEPTOR POSITIVE: Primary | ICD-10-CM

## 2023-08-16 PROCEDURE — 25010000002 LEUPROLIDE 22.5 MG KIT: Performed by: INTERNAL MEDICINE

## 2023-08-16 RX ADMIN — LEUPROLIDE ACETATE 22.5 MG: KIT SUBCUTANEOUS at 15:50

## 2023-08-16 NOTE — TELEPHONE ENCOUNTER
Pt stopped at nurses desk and made RN aware pharmacy stated does not carry exemestane. RN stated would make Dr. Nicole aware. V/u obtained.

## 2023-08-22 ENCOUNTER — SPECIALTY PHARMACY (OUTPATIENT)
Dept: ONCOLOGY | Facility: CLINIC | Age: 38
End: 2023-08-22
Payer: COMMERCIAL

## 2023-08-22 RX ORDER — EXEMESTANE 25 MG/1
25 TABLET ORAL DAILY
Qty: 90 TABLET | Refills: 0 | Status: SHIPPED | OUTPATIENT
Start: 2023-08-22 | End: 2023-08-22 | Stop reason: SDUPTHER

## 2023-08-22 RX ORDER — EXEMESTANE 25 MG/1
25 TABLET ORAL DAILY
Qty: 90 TABLET | Refills: 0 | Status: SHIPPED | OUTPATIENT
Start: 2023-08-22

## 2023-08-22 NOTE — TELEPHONE ENCOUNTER
Called and spoke to pt about alternative pharmacy for exemestane. Pt stated okay to try [facility], [facility] outpatient pharmacy, or [facility]. Pt stated Dr. Nicole had instructed her to start taking vitamin C or vitamin D and maybe calcium and is needing to be reminded how much to take. Please advise.

## 2023-08-22 NOTE — PROGRESS NOTES
Care Coordination General Call Note    We are unable to get aromasin at [facility] pharmacy. Pt insurance requires her to use [facility] [phone].pt is aware.         Suzanne Myles, Pharmacy Technician  8/22/2023  16:53 CDT

## 2023-08-22 NOTE — TELEPHONE ENCOUNTER
Called and made pt aware exemestane prescription sent to [facility] outpatient pharmacy. Instructed pt per Dr. Nicole to take Vitamin D 2000 IU. V/u obtained.

## 2023-08-28 DIAGNOSIS — F90.2 ATTENTION DEFICIT HYPERACTIVITY DISORDER, COMBINED TYPE: ICD-10-CM

## 2023-08-28 RX ORDER — DEXTROAMPHETAMINE SACCHARATE, AMPHETAMINE ASPARTATE MONOHYDRATE, DEXTROAMPHETAMINE SULFATE AND AMPHETAMINE SULFATE 5; 5; 5; 5 MG/1; MG/1; MG/1; MG/1
20 CAPSULE, EXTENDED RELEASE ORAL EVERY MORNING
Qty: 30 CAPSULE | Refills: 0 | Status: SHIPPED | OUTPATIENT
Start: 2023-08-28

## 2023-09-28 ENCOUNTER — TELEMEDICINE (OUTPATIENT)
Dept: PSYCHIATRY | Facility: CLINIC | Age: 38
End: 2023-09-28
Payer: COMMERCIAL

## 2023-09-28 DIAGNOSIS — F90.2 ATTENTION DEFICIT HYPERACTIVITY DISORDER, COMBINED TYPE: Primary | ICD-10-CM

## 2023-09-28 RX ORDER — DEXTROAMPHETAMINE SACCHARATE, AMPHETAMINE ASPARTATE MONOHYDRATE, DEXTROAMPHETAMINE SULFATE AND AMPHETAMINE SULFATE 6.25; 6.25; 6.25; 6.25 MG/1; MG/1; MG/1; MG/1
25 CAPSULE, EXTENDED RELEASE ORAL EVERY MORNING
Qty: 30 CAPSULE | Refills: 0 | Status: SHIPPED | OUTPATIENT
Start: 2023-09-28

## 2023-09-28 NOTE — PROGRESS NOTES
"This provider is located at [facility], [address] [place] [address], [facility], [address] using a secure [employer]hart Video Visit through [employer]. Patient is being seen remotely via telehealth at the parking lot of the [facility] in [place], and stated they are in a secure environment for this session. The patient's condition being diagnosed/treated is appropriate for telemedicine. The provider identified herself as well as her credentials.   The patient, and/or patients guardian, consent to be seen remotely, and when consent is given they understand that the consent allows for patient identifiable information to be sent to a third party as needed.   They may refuse to be seen remotely at any time. The electronic data is encrypted and password protected, and the patient and/or guardian has been advised of the potential risks to privacy not withstanding such measures.   PT Identifiers used: Name and .    You have chosen to receive care through a telehealth visit.  Do you consent to use a video/audio connection for your medical care today? Yes      Subjective   Christie Griffith is a 38 y.o. female who presents today for follow up for medication management    Chief Complaint:  \"ADHD\"     History of Present Illness:    History of Present Illness  Patient reported above chief complaint, she has finished her bilateral mastectomy surgery and has been released from physical therapy, and is working out at the [facility] 3 days a week and also walking 2 days a week.  She is growing her hair back and, and reports she is sleeping well.  Appetite is also reported to be good.  She is having a little bit more forgetfulness but not sure if it is due to the chemotherapy she endured or if that is the ADHD.  She has been on the same dose of medication for over a year, so I feel like we can try an increased dosage of the Adderall XR and see if this might help with some of her symptoms.  She is having issues with word finding and difficulty " maintaining conversations at times because she is forgetting what she wants to say.                Last Menstrual Period:  Lupron injections every 3 mos    The following portions of the patient's history were reviewed and updated as appropriate: allergies, current medications, past family history, past medical history, past social history, past surgical history and problem list.    Past Psychiatric History:  Patient reports only taking the Wellbutrin, no history of admissions, no history of self harming behaviors, no SI, HI, hallucinations or psychosis.  She reports she did have postpartum depression and managed to work through it but it was very difficult.    Past Medical History:  Past Medical History:   Diagnosis Date    ADHD (attention deficit hyperactivity disorder) 7-22    Generalized anxiety disorder     Malignant neoplasm of upper-outer quadrant of right female breast 02/03/2023    PONV (postoperative nausea and vomiting)     Psoriasis     Varicella 1990       Substance Abuse History:   Types:Denies all, including illicit      Social History:  Social History     Socioeconomic History    Marital status:      Spouse name: Aurelio    Number of children: 1    Highest education level: Master's degree (e.g., MA, MS, Emma, MEd, MSW, JUSTINA)   Tobacco Use    Smoking status: Never    Smokeless tobacco: Never   Vaping Use    Vaping Use: Never used   Substance and Sexual Activity    Alcohol use: Not Currently    Drug use: Never    Sexual activity: Yes     Partners: Male     Birth control/protection: I.U.D., Implant   Patient reports good support system with family and friends.  She is a fourth grade  at [employer] in 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  Reports Gnosticism belief system.  Denies any history of  service or any legal issues.    Family History:  Family History   Problem Relation Age of Onset    Lymphoma Mother     Diabetes Mother      Heart disease Mother     Coronary artery disease Father     Atrial fibrillation Father     Heart disease Father     Thyroid disease Other     Heart murmur Daughter        Past Surgical History:  Past Surgical History:   Procedure Laterality Date    BILATERAL BREAST REDUCTION  01/2009    MASTECTOMY Bilateral 07/26/2023    MOUTH SURGERY      x 4 dental implants    REDUCTION MAMMAPLASTY      VENOUS ACCESS DEVICE (PORT) INSERTION Left 02/16/2023    Procedure: MEDIPORT PLACEMENT              (C-ARM#2);  Surgeon: Bruce Charles MD;  Location: [facility];  Service: General;  Laterality: Left;    WISDOM TOOTH EXTRACTION         Problem List:  Patient Active Problem List   Diagnosis    Mass of upper outer quadrant of right breast    Malignant neoplasm of upper-outer quadrant of right female breast    Other long term (current) drug therapy    Encounter for venous access device care    CINV (chemotherapy-induced nausea and vomiting)    Skin rash    Mucositis due to antineoplastic therapy    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 90 tablet 0    amphetamine-dextroamphetamine XR (ADDERALL XR) 25 MG 24 hr capsule Take 1 capsule by mouth Every Morning 30 capsule 0     No current facility-administered medications for this visit.       Review of Systems:    Review of Systems   Neurological:  Positive for memory problem.   Psychiatric/Behavioral:  Positive for decreased concentration.    All other systems reviewed and are negative.      Physical Exam:   Physical Exam  Vitals reviewed.   HENT:      Head: Normocephalic.   Neurological:      Mental Status: She is alert.   Psychiatric:         Attention and  Perception: Attention and perception normal.         Mood and Affect: Mood and affect normal.         Speech: Speech normal.         Behavior: Behavior normal. Behavior is cooperative.         Thought Content: Thought content normal.         Cognition and Memory: Cognition normal. Memory is impaired (Word-finding and forgetfulness).         Judgment: Judgment normal.      Comments:         Vitals:  not currently breastfeeding. There is no height or weight on file to calculate BMI.  Due to extenuating circumstances and possible current health risks associated with the patient being present in a clinical setting (with current health restrictions in place in regards to possible COVID 19 transmission/exposure), the patient was seen remotely today via a MyChart Video Visit through [facility] and telephone encounter.  Unable to obtain vital signs due to nature of remote visit.  Height stated at 67 inches.  Weight stated at 162 pounds.    Last 3 Blood Pressure Readings:  BP Readings from Last 3 Encounters:   08/16/23 131/86   08/11/23 142/68   08/09/23 166/78         Mental Status Exam:   Hygiene:   good  Cooperation:  Cooperative  Eye Contact:  Good  Psychomotor Behavior:  Appropriate  Affect:  Full range  Mood: euthymic  Hopelessness: Denies  Speech:  Normal  Thought Process:  Goal directed and Linear  Thought Content:  Normal  Suicidal:  None  Homicidal:  None  Hallucinations:  None  Delusion:  None  Memory:  Intact  Orientation:  Person, Place, Time and Situation  Reliability:  good  Insight:  Good  Judgement:  Good  Impulse Control:  Good  Physical/Medical Issues:  Yes patient has completed chemotherapy, bilateral mastectomy surgery, and physical therapy related to breast cancer diagnoses        Lab Results:   No visits with results within 1 Month(s) from this visit.   Latest known visit with results is:   Infusion on 06/26/2023   Component Date Value Ref Range Status    Glucose 06/26/2023 130 (H)  65 - 99 mg/dL Final     BUN 06/26/2023 16  6 - 20 mg/dL Final    Creatinine 06/26/2023 0.81  0.57 - 1.00 mg/dL Final    Sodium 06/26/2023 140  136 - 145 mmol/L Final    Potassium 06/26/2023 3.6  3.5 - 5.2 mmol/L Final    Chloride 06/26/2023 104  98 - 107 mmol/L Final    CO2 06/26/2023 25.0  22.0 - 29.0 mmol/L Final    Calcium 06/26/2023 9.7  8.6 - 10.5 mg/dL Final    Total Protein 06/26/2023 7.1  6.0 - 8.5 g/dL Final    Albumin 06/26/2023 4.5  3.5 - 5.2 g/dL Final    ALT (SGPT) 06/26/2023 27  1 - 33 U/L Final    AST (SGOT) 06/26/2023 19  1 - 32 U/L Final    Alkaline Phosphatase 06/26/2023 114  39 - 117 U/L Final    Total Bilirubin 06/26/2023 0.4  0.0 - 1.2 mg/dL Final    Globulin 06/26/2023 2.6  gm/dL Final    A/G Ratio 06/26/2023 1.7  g/dL Final    BUN/Creatinine Ratio 06/26/2023 19.8  7.0 - 25.0 Final    Anion Gap 06/26/2023 11.0  5.0 - 15.0 mmol/L Final    eGFR 06/26/2023 95.4  >60.0 mL/min/1.73 Final    WBC 06/26/2023 6.43  3.40 - 10.80 10*3/mm3 Final    RBC 06/26/2023 3.73 (L)  3.77 - 5.28 10*6/mm3 Final    Hemoglobin 06/26/2023 11.9 (L)  12.0 - 15.9 g/dL Final    Hematocrit 06/26/2023 34.0  34.0 - 46.6 % Final    MCV 06/26/2023 91.2  79.0 - 97.0 fL Final    MCH 06/26/2023 31.9  26.6 - 33.0 pg Final    MCHC 06/26/2023 35.0  31.5 - 35.7 g/dL Final    RDW 06/26/2023 12.9  12.3 - 15.4 % Final    RDW-SD 06/26/2023 41.9  37.0 - 54.0 fl Final    MPV 06/26/2023 11.0  6.0 - 12.0 fL Final    Platelets 06/26/2023 266  140 - 450 10*3/mm3 Final    Neutrophil % 06/26/2023 73.6  42.7 - 76.0 % Final    Lymphocyte % 06/26/2023 12.0 (L)  19.6 - 45.3 % Final    Monocyte % 06/26/2023 10.9  5.0 - 12.0 % Final    Eosinophil % 06/26/2023 1.9  0.3 - 6.2 % Final    Basophil % 06/26/2023 1.4  0.0 - 1.5 % Final    Immature Grans % 06/26/2023 0.2  0.0 - 0.5 % Final    Neutrophils, Absolute 06/26/2023 4.74  1.70 - 7.00 10*3/mm3 Final    Lymphocytes, Absolute 06/26/2023 0.77  0.70 - 3.10 10*3/mm3 Final    Monocytes, Absolute 06/26/2023 0.70  0.10 - 0.90 10*3/mm3  "Final    Eosinophils, Absolute 06/26/2023 0.12  0.00 - 0.40 10*3/mm3 Final    Basophils, Absolute 06/26/2023 0.09  0.00 - 0.20 10*3/mm3 Final    Immature Grans, Absolute 06/26/2023 0.01  0.00 - 0.05 10*3/mm3 Final    nRBC 06/26/2023 0.0  0.0 - 0.2 /100 WBC Final    Extra Tube 06/26/2023 Hold for add-ons.   Final    Auto resulted.       Assessment & Plan   Diagnoses and all orders for this visit:    1. Attention deficit hyperactivity disorder, combined type (Primary)  -     amphetamine-dextroamphetamine XR (ADDERALL XR) 25 MG 24 hr capsule; Take 1 capsule by mouth Every Morning  Dispense: 30 capsule; Refill: 0        Visit Diagnoses:    ICD-10-CM ICD-9-CM   1. Attention deficit hyperactivity disorder, combined type  F90.2 314.01         GOALS:  Short Term Goals: Patient will be compliant with medication, and patient will have no significant medication related side effects.  Patient will be engaged in psychotherapy as indicated.  Patient will report subjective improvement of symptoms.  Long term goals: To stabilize mood and treat/improve subjective symptoms, the patient will stay out of the hospital, the patient will be at an optimal level of functioning, and the patient will take all medications as prescribed.  The patient/guardian verbalized understanding and agreement with goals that were mutually set.    RISK ASSESSMENT  Current harm-to-self risk is reported by pt as \"none.\"  Current [id number] risk is reported by pt as \"none.\"   No suicidal thoughts, intent, plan is appreciated by this provider on this date of exam.   No homicidal thoughts, intent, plan is appreciated by this provider on this date.    TREATMENT PLAN: Continue supportive psychotherapy efforts and medications as indicated.   Pharmacological and Non-Pharmacological treatment options discussed during today's visit. Patient/Guardian acknowledged and verbally consented with current treatment plan and was educated on the importance of compliance " with treatment and follow-up appointments.      MEDICATION ISSUES:  Discussed medication options and treatment plan of prescribed medication as well as the risks, benefits, any black box warnings, and side effects including potential falls, possible impaired driving, and metabolic adversities among others. Patient is agreeable to call the office with any worsening of symptoms or onset of side effects, or if any concerns or questions arise.  The contact information for the office is made available to the patient. Patient is agreeable to call 911 or go to the nearest ER should they begin having any SI/HI, or if any urgent concerns arise. No medication side effects or related complaints today.     Increase Adderall XR to 25 mg every morning          MEDS ORDERED DURING VISIT:  New Medications Ordered This Visit   Medications    amphetamine-dextroamphetamine XR (ADDERALL XR) 25 MG 24 hr capsule     Sig: Take 1 capsule by mouth Every Morning     Dispense:  30 capsule     Refill:  0     Dosage change        Follow Up Appointment:   Return in about 6 weeks (around 11/7/2023) for Recheck, Video visit.               This document has been electronically signed by MELONY Reich  September 28, 2023 16:56 EDT    Please note that portions of this note were completed with a voice recognition program. Efforts were made to edit dictation, but occasionally words are mistranscribed.

## 2023-09-28 NOTE — PATIENT INSTRUCTIONS
For concerns or needing assistance call the [facility] at [phone]  Increase Adderall XR to 25 mg every morning

## 2023-10-31 DIAGNOSIS — F90.2 ATTENTION DEFICIT HYPERACTIVITY DISORDER, COMBINED TYPE: ICD-10-CM

## 2023-10-31 RX ORDER — DEXTROAMPHETAMINE SACCHARATE, AMPHETAMINE ASPARTATE MONOHYDRATE, DEXTROAMPHETAMINE SULFATE AND AMPHETAMINE SULFATE 6.25; 6.25; 6.25; 6.25 MG/1; MG/1; MG/1; MG/1
25 CAPSULE, EXTENDED RELEASE ORAL EVERY MORNING
Qty: 30 CAPSULE | Refills: 0 | Status: SHIPPED | OUTPATIENT
Start: 2023-10-31

## 2023-11-14 ENCOUNTER — TELEMEDICINE (OUTPATIENT)
Dept: PSYCHIATRY | Facility: CLINIC | Age: 38
End: 2023-11-14
Payer: COMMERCIAL

## 2023-11-14 DIAGNOSIS — F90.2 ATTENTION DEFICIT HYPERACTIVITY DISORDER, COMBINED TYPE: Primary | ICD-10-CM

## 2023-11-14 RX ORDER — DEXTROAMPHETAMINE SACCHARATE, AMPHETAMINE ASPARTATE MONOHYDRATE, DEXTROAMPHETAMINE SULFATE AND AMPHETAMINE SULFATE 5; 5; 5; 5 MG/1; MG/1; MG/1; MG/1
20 CAPSULE, EXTENDED RELEASE ORAL EVERY MORNING
Qty: 30 CAPSULE | Refills: 0 | Status: SHIPPED | OUTPATIENT
Start: 2023-11-14

## 2023-11-14 NOTE — PROGRESS NOTES
"This provider is located at [facility], [address] [place] [address], [facility], [address] using a secure [employer]hart Video Visit through [employer]. Patient is being seen remotely via telehealth  in [facility], and stated they are in a secure environment for this session. The patient's condition being diagnosed/treated is appropriate for telemedicine. The provider identified herself as well as her credentials.   The patient, and/or patients guardian, consent to be seen remotely, and when consent is given they understand that the consent allows for patient identifiable information to be sent to a third party as needed.   They may refuse to be seen remotely at any time. The electronic data is encrypted and password protected, and the patient and/or guardian has been advised of the potential risks to privacy not withstanding such measures.   PT Identifiers used: Name and .    You have chosen to receive care through a telehealth visit.  Do you consent to use a video/audio connection for your medical care today? Yes      Subjective   Christie Griffith is a 38 y.o. female who presents today for follow up for medication management    Chief Complaint:  \"ADHD\"     History of Present Illness:    History of Present Illness  Prior encounter patient's Adderall was increased by 5 mg to Adderall XR 25 mg every morning due to reports of more difficulty with focus and concentration after finishing chemotherapy for breast cancer.  She reports she could not see any major difference and thinks maybe the increase made her brain try to work faster and actually was decreasing her ability to focus and concentrate.  She is using a lot of self-care by exercising and increasing her protein intake and using collagen supplements.  Appetite is reported to be very good.  Sleep is reported to be good.  She would like to decrease the Adderall back down to the 20 mg XR.          Last Menstrual Period:  Lupron injections every 3 mos    The following " portions of the patient's history were reviewed and updated as appropriate: allergies, current medications, past family history, past medical history, past social history, past surgical history and problem list.    Past Psychiatric History:  Patient reports only taking the Wellbutrin, no history of admissions, no history of self harming behaviors, no SI, HI, hallucinations or psychosis.  She reports she did have postpartum depression and managed to work through it but it was very difficult.    Past Medical History:  Past Medical History:   Diagnosis Date    ADHD (attention deficit hyperactivity disorder) 7-22    Generalized anxiety disorder     Malignant neoplasm of upper-outer quadrant of right female breast 02/03/2023    PONV (postoperative nausea and vomiting)     Psoriasis     Varicella 1990       Substance Abuse History:   Types:Denies all, including illicit      Social History:  Social History     Socioeconomic History    Marital status:      Spouse name: Aurelio    Number of children: 1    Highest education level: Master's degree (e.g., MA, MS, Emma, MEd, MSW, JUSTINA)   Tobacco Use    Smoking status: Never    Smokeless tobacco: Never   Vaping Use    Vaping Use: Never used   Substance and Sexual Activity    Alcohol use: Not Currently    Drug use: Never    Sexual activity: Yes     Partners: Male     Birth control/protection: I.U.D., Implant   Patient reports good support system with family and friends.  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  Reports Yarsani belief system.  Denies any history of  service or any legal issues.    Family History:  Family History   Problem Relation Age of Onset    Lymphoma Mother     Diabetes Mother     Heart disease Mother     Coronary artery disease Father     Atrial fibrillation Father     Heart disease Father     Thyroid disease Other     Heart murmur Daughter         Past Surgical History:  Past Surgical History:   Procedure Laterality Date    BILATERAL BREAST REDUCTION  01/2009    MASTECTOMY Bilateral 07/26/2023    MOUTH SURGERY      x 4 dental implants    REDUCTION MAMMAPLASTY      VENOUS ACCESS DEVICE (PORT) INSERTION Left 02/16/2023    Procedure: MEDIPORT PLACEMENT              (C-ARM#2);  Surgeon: Bruce Charles MD;  Location: [facility];  Service: General;  Laterality: Left;    WISDOM TOOTH EXTRACTION         Problem List:  Patient Active Problem List   Diagnosis    Mass of upper outer quadrant of right breast    Malignant neoplasm of upper-outer quadrant of right female breast    Other long term (current) drug therapy    Encounter for venous access device care    CINV (chemotherapy-induced nausea and vomiting)    Skin rash    Mucositis due to antineoplastic therapy    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 90 tablet 0    amphetamine-dextroamphetamine XR (Adderall XR) 20 MG 24 hr capsule Take 1 capsule by mouth Every Morning 30 capsule 0     No current facility-administered medications for this visit.       Review of Systems:    Review of Systems   Neurological:  Positive for memory problem.   Psychiatric/Behavioral:  Positive for decreased concentration.    All other systems reviewed and are negative.        Physical Exam:   Physical Exam  Vitals reviewed.   Constitutional:       Appearance: Normal appearance. She is well-developed and well-groomed.   HENT:      Head: Normocephalic.   Neurological:      Mental Status: She is alert.   Psychiatric:         Attention and Perception: Attention and perception normal.         Mood and Affect:  Mood and affect normal.         Speech: Speech normal.         Behavior: Behavior normal. Behavior is cooperative.         Thought Content: Thought content normal.         Cognition and Memory: Cognition normal. Memory is impaired.         Judgment: Judgment normal.      Comments:           Vitals:  not currently breastfeeding. There is no height or weight on file to calculate BMI.  Due to extenuating circumstances and possible current health risks associated with the patient being present in a clinical setting (with current health restrictions in place in regards to possible COVID 19 transmission/exposure), the patient was seen remotely today via a MyChart Video Visit through [facility] and telephone encounter.  Unable to obtain vital signs due to nature of remote visit.  Height stated at 67 inches.  Weight stated at 162 pounds.    Last 3 Blood Pressure Readings:  BP Readings from Last 3 Encounters:   08/16/23 131/86   08/11/23 142/68   08/09/23 166/78         Mental Status Exam:   Hygiene:   good  Cooperation:  Cooperative  Eye Contact:  Good  Psychomotor Behavior:  Appropriate  Affect:  Full range  Mood: euthymic  Hopelessness: Denies  Speech:  Normal  Thought Process:  Goal directed and Linear  Thought Content:  Normal  Suicidal:  None  Homicidal:  None  Hallucinations:  None  Delusion:  None  Memory:  Intact  Orientation:  Person, Place, Time and Situation  Reliability:  good  Insight:  Good  Judgement:  Good  Impulse Control:  Good  Physical/Medical Issues:  Yes patient has completed chemotherapy, bilateral mastectomy surgery, and physical therapy related to breast cancer diagnoses        Lab Results:   No visits with results within 1 Month(s) from this visit.   Latest known visit with results is:   Infusion on 06/26/2023   Component Date Value Ref Range Status    Glucose 06/26/2023 130 (H)  65 - 99 mg/dL Final    BUN 06/26/2023 16  6 - 20 mg/dL Final    Creatinine 06/26/2023 0.81  0.57 - 1.00 mg/dL Final    Sodium  06/26/2023 140  136 - 145 mmol/L Final    Potassium 06/26/2023 3.6  3.5 - 5.2 mmol/L Final    Chloride 06/26/2023 104  98 - 107 mmol/L Final    CO2 06/26/2023 25.0  22.0 - 29.0 mmol/L Final    Calcium 06/26/2023 9.7  8.6 - 10.5 mg/dL Final    Total Protein 06/26/2023 7.1  6.0 - 8.5 g/dL Final    Albumin 06/26/2023 4.5  3.5 - 5.2 g/dL Final    ALT (SGPT) 06/26/2023 27  1 - 33 U/L Final    AST (SGOT) 06/26/2023 19  1 - 32 U/L Final    Alkaline Phosphatase 06/26/2023 114  39 - 117 U/L Final    Total Bilirubin 06/26/2023 0.4  0.0 - 1.2 mg/dL Final    Globulin 06/26/2023 2.6  gm/dL Final    A/G Ratio 06/26/2023 1.7  g/dL Final    BUN/Creatinine Ratio 06/26/2023 19.8  7.0 - 25.0 Final    Anion Gap 06/26/2023 11.0  5.0 - 15.0 mmol/L Final    eGFR 06/26/2023 95.4  >60.0 mL/min/1.73 Final    WBC 06/26/2023 6.43  3.40 - 10.80 10*3/mm3 Final    RBC 06/26/2023 3.73 (L)  3.77 - 5.28 10*6/mm3 Final    Hemoglobin 06/26/2023 11.9 (L)  12.0 - 15.9 g/dL Final    Hematocrit 06/26/2023 34.0  34.0 - 46.6 % Final    MCV 06/26/2023 91.2  79.0 - 97.0 fL Final    MCH 06/26/2023 31.9  26.6 - 33.0 pg Final    MCHC 06/26/2023 35.0  31.5 - 35.7 g/dL Final    RDW 06/26/2023 12.9  12.3 - 15.4 % Final    RDW-SD 06/26/2023 41.9  37.0 - 54.0 fl Final    MPV 06/26/2023 11.0  6.0 - 12.0 fL Final    Platelets 06/26/2023 266  140 - 450 10*3/mm3 Final    Neutrophil % 06/26/2023 73.6  42.7 - 76.0 % Final    Lymphocyte % 06/26/2023 12.0 (L)  19.6 - 45.3 % Final    Monocyte % 06/26/2023 10.9  5.0 - 12.0 % Final    Eosinophil % 06/26/2023 1.9  0.3 - 6.2 % Final    Basophil % 06/26/2023 1.4  0.0 - 1.5 % Final    Immature Grans % 06/26/2023 0.2  0.0 - 0.5 % Final    Neutrophils, Absolute 06/26/2023 4.74  1.70 - 7.00 10*3/mm3 Final    Lymphocytes, Absolute 06/26/2023 0.77  0.70 - 3.10 10*3/mm3 Final    Monocytes, Absolute 06/26/2023 0.70  0.10 - 0.90 10*3/mm3 Final    Eosinophils, Absolute 06/26/2023 0.12  0.00 - 0.40 10*3/mm3 Final    Basophils, Absolute  "06/26/2023 0.09  0.00 - 0.20 10*3/mm3 Final    Immature Grans, Absolute 06/26/2023 0.01  0.00 - 0.05 10*3/mm3 Final    nRBC 06/26/2023 0.0  0.0 - 0.2 /100 WBC Final    Extra Tube 06/26/2023 Hold for add-ons.   Final    Auto resulted.       Assessment & Plan   Diagnoses and all orders for this visit:    1. Attention deficit hyperactivity disorder, combined type (Primary)  -     amphetamine-dextroamphetamine XR (Adderall XR) 20 MG 24 hr capsule; Take 1 capsule by mouth Every Morning  Dispense: 30 capsule; Refill: 0          Visit Diagnoses:    ICD-10-CM ICD-9-CM   1. Attention deficit hyperactivity disorder, combined type  F90.2 314.01           GOALS:  Short Term Goals: Patient will be compliant with medication, and patient will have no significant medication related side effects.  Patient will be engaged in psychotherapy as indicated.  Patient will report subjective improvement of symptoms.  Long term goals: To stabilize mood and treat/improve subjective symptoms, the patient will stay out of the hospital, the patient will be at an optimal level of functioning, and the patient will take all medications as prescribed.  The patient/guardian verbalized understanding and agreement with goals that were mutually set.    RISK ASSESSMENT  Current harm-to-self risk is reported by pt as \"none.\"  Current [id number] risk is reported by pt as \"none.\"   No suicidal thoughts, intent, plan is appreciated by this provider on this date of exam.   No homicidal thoughts, intent, plan is appreciated by this provider on this date.    TREATMENT PLAN: Continue supportive psychotherapy efforts and medications as indicated.   Pharmacological and Non-Pharmacological treatment options discussed during today's visit. Patient/Guardian acknowledged and verbally consented with current treatment plan and was educated on the importance of compliance with treatment and follow-up appointments.      MEDICATION ISSUES:  Discussed medication options " and treatment plan of prescribed medication as well as the risks, benefits, any black box warnings, and side effects including potential falls, possible impaired driving, and metabolic adversities among others. Patient is agreeable to call the office with any worsening of symptoms or onset of side effects, or if any concerns or questions arise.  The contact information for the office is made available to the patient. Patient is agreeable to call 911 or go to the nearest ER should they begin having any SI/HI, or if any urgent concerns arise. No medication side effects or related complaints today.     Decrease Adderall XR to 20 mg every morning  Call 1 week prior to needing refills       MEDS ORDERED DURING VISIT:  New Medications Ordered This Visit   Medications    amphetamine-dextroamphetamine XR (Adderall XR) 20 MG 24 hr capsule     Sig: Take 1 capsule by mouth Every Morning     Dispense:  30 capsule     Refill:  0     Dosage change        Follow Up Appointment:   Return in about 3 months (around 2/19/2024) for Recheck, Video visit.               This document has been electronically signed by MELONY Reich  November 15, 2023 08:44 EST    Please note that portions of this note were completed with a voice recognition program. Efforts were made to edit dictation, but occasionally words are mistranscribed.

## 2023-12-04 DIAGNOSIS — F90.2 ATTENTION DEFICIT HYPERACTIVITY DISORDER, COMBINED TYPE: ICD-10-CM

## 2023-12-04 RX ORDER — DEXTROAMPHETAMINE SACCHARATE, AMPHETAMINE ASPARTATE MONOHYDRATE, DEXTROAMPHETAMINE SULFATE AND AMPHETAMINE SULFATE 5; 5; 5; 5 MG/1; MG/1; MG/1; MG/1
20 CAPSULE, EXTENDED RELEASE ORAL EVERY MORNING
Qty: 30 CAPSULE | Refills: 0 | OUTPATIENT
Start: 2023-12-04

## 2024-01-31 DIAGNOSIS — F90.2 ATTENTION DEFICIT HYPERACTIVITY DISORDER, COMBINED TYPE: ICD-10-CM

## 2024-01-31 RX ORDER — DEXTROAMPHETAMINE SACCHARATE, AMPHETAMINE ASPARTATE MONOHYDRATE, DEXTROAMPHETAMINE SULFATE AND AMPHETAMINE SULFATE 5; 5; 5; 5 MG/1; MG/1; MG/1; MG/1
20 CAPSULE, EXTENDED RELEASE ORAL EVERY MORNING
Qty: 30 CAPSULE | Refills: 0 | Status: SHIPPED | OUTPATIENT
Start: 2024-01-31

## 2024-02-19 ENCOUNTER — TELEMEDICINE (OUTPATIENT)
Dept: PSYCHIATRY | Facility: CLINIC | Age: 39
End: 2024-02-19
Payer: COMMERCIAL

## 2024-02-19 DIAGNOSIS — F90.2 ATTENTION DEFICIT HYPERACTIVITY DISORDER, COMBINED TYPE: Primary | ICD-10-CM

## 2024-02-19 PROCEDURE — 99213 OFFICE O/P EST LOW 20 MIN: CPT | Performed by: NURSE PRACTITIONER

## 2024-02-19 NOTE — PATIENT INSTRUCTIONS
For concerns or needing assistance call the Behavioral Health [facility] at [phone]  Should you ever need assistance or just want to reach out to someone when your behavioral health provider is not available due to the office being closed you can contact [email or web address]. Just text HOME to [phone] and someone will reach out to you within a few minutes.  This is a 24/7 help line and they are open holidays. Be sure and let us know as soon as our office opens so we can get you in for a follow up.  As always, go to the closest emergency room or call 911 if you feel you need immediate assistance.    Continue Adderall XR 20 mg every morning  Call 1 week prior to needing refills

## 2024-02-19 NOTE — PROGRESS NOTES
"This provider is located at [facility], [address] [place] [address], [facility], [address] using a secure [employer] Video Visit through [employer]. Patient is being seen remotely via telehealth  in [facility], and stated they are in a secure environment for this session. The patient's condition being diagnosed/treated is appropriate for telemedicine. The provider identified herself as well as her credentials.   The patient, and/or patients guardian, consent to be seen remotely, and when consent is given they understand that the consent allows for patient identifiable information to be sent to a third party as needed.   They may refuse to be seen remotely at any time. The electronic data is encrypted and password protected, and the patient and/or guardian has been advised of the potential risks to privacy not withstanding such measures.   PT Identifiers used: Name and .    You have chosen to receive care through a telehealth visit.  Do you consent to use a video/audio connection for your medical care today? Yes      Subjective   Christie Griffith is a 38 y.o. female who presents today for follow up for medication management    Chief Complaint:  \"ADHD\"     History of Present Illness:    History of Present Illness  Patient reported for appointment from her home.  She has had a difficult last few weeks.  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  This is a neurologically affective disease process that the patient reported was similar to Chele [place] but instead of starting in the lower extremities and moving up it was starting in her head and moving down.  She reports she was in rehab for about 10 days in [facility].  She had to relearn how to eat and swallow and walk among other things.  She has been off work and will not return until at least the beginning of March.  She will continue physical therapy until she is reevaluated.  Patient reported she was allowed to continue " with her Adderall, and at 20 mg XR she believes the medication has been helpful.  Again she has not been working so she is not been able to assess the medications effectiveness in the classroom setting.  Patient is a fourth grade .  Her ADHD symptoms include difficulty with maintaining focus and concentration for tasks, easily distracted, forgetfulness.             Last Menstrual Period:  Lupron injections every 3 mos    The following portions of the patient's history were reviewed and updated as appropriate: allergies, current medications, past family history, past medical history, past social history, past surgical history and problem list.    Past Psychiatric History:  Patient reports only taking the Wellbutrin, no history of admissions, no history of self harming behaviors, no SI, HI, hallucinations or psychosis.  She reports she did have postpartum depression and managed to work through it but it was very difficult.    Past Medical History:  Past Medical History:   Diagnosis Date    ADHD (attention deficit hyperactivity disorder) 7-22    Generalized anxiety disorder     Malignant neoplasm of upper-outer quadrant of right female breast 02/03/2023    PONV (postoperative nausea and vomiting)     Psoriasis     Varicella 1990       Substance Abuse History:   Types:Denies all, including illicit      Social History:  Social History     Socioeconomic History    Marital status:      Spouse name: Aurelio    Number of children: 1    Highest education level: Master's degree (e.g., MA, MS, Emma, MEd, MSW, JUSTINA)   Tobacco Use    Smoking status: Never    Smokeless tobacco: Never   Vaping Use    Vaping Use: Never used   Substance and Sexual Activity    Alcohol use: Not Currently    Drug use: Never    Sexual activity: Yes     Partners: Male     Birth control/protection: I.U.D., Implant   Patient reports good support system with family and friends.  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  Reports Faith belief system.  Denies any history of  service or any legal issues.    Family History:  Family History   Problem Relation Age of Onset    Lymphoma Mother     Diabetes Mother     Heart disease Mother     Coronary artery disease Father     Atrial fibrillation Father     Heart disease Father     Thyroid disease Other     Heart murmur Daughter        Past Surgical History:  Past Surgical History:   Procedure Laterality Date    BILATERAL BREAST REDUCTION  01/2009    MASTECTOMY Bilateral 07/26/2023    MOUTH SURGERY      x 4 dental implants    REDUCTION MAMMAPLASTY      VENOUS ACCESS DEVICE (PORT) INSERTION Left 02/16/2023    Procedure: MEDIPORT PLACEMENT              (C-ARM#2);  Surgeon: Bruce Charles MD;  Location: [facility];  Service: General;  Laterality: Left;    WISDOM TOOTH EXTRACTION         Problem List:  Patient Active Problem List   Diagnosis    Mass of upper outer quadrant of right breast    Malignant neoplasm of upper-outer quadrant of right female breast    Other long term (current) drug therapy    Encounter for venous access device care    CINV (chemotherapy-induced nausea and vomiting)    Skin rash    Mucositis due to antineoplastic therapy    Palmar plantar erythrodysaesthesia due to cytotoxic therapy    Anxiety       Allergy:   Allergies   Allergen Reactions    Emend [Fosaprepitant] Shortness Of Breath    Transderm-Scop [Scopolamine] Dizziness        Current Medications:   Current Outpatient Medications   Medication Sig Dispense Refill    amphetamine-dextroamphetamine XR (Adderall XR) 20 MG 24 hr capsule Take 1 capsule by mouth Every Morning 30 capsule 0    exemestane (AROMASIN) 25 MG tablet Take 1 tablet by mouth Daily. 90 tablet 0     No current facility-administered medications for this visit.       Review of Systems:    Review of Systems   Psychiatric/Behavioral:  Positive for  stress.    All other systems reviewed and are negative.        Physical Exam:   Physical Exam  Vitals reviewed.   Constitutional:       Appearance: Normal appearance. She is well-developed and well-groomed.   HENT:      Head: Normocephalic.   Neurological:      Mental Status: She is alert.   Psychiatric:         Attention and Perception: Attention and perception normal.         Mood and Affect: Mood and affect normal.         Speech: Speech normal.         Behavior: Behavior normal. Behavior is cooperative.         Thought Content: Thought content normal.         Cognition and Memory: Cognition normal.         Judgment: Judgment normal.      Comments:           Vitals:  not currently breastfeeding. There is no height or weight on file to calculate BMI.  Due to extenuating circumstances and possible current health risks associated with the patient being present in a clinical setting (with current health restrictions in place in regards to possible COVID 19 transmission/exposure), the patient was seen remotely today via a MyChart Video Visit through [facility] and telephone encounter.  Unable to obtain vital signs due to nature of remote visit.  Height stated at 67 inches.  Weight stated at 162 pounds.    Last 3 Blood Pressure Readings:  BP Readings from Last 3 Encounters:   08/16/23 131/86   08/11/23 142/68   08/09/23 166/78         Mental Status Exam:   Hygiene:   good  Cooperation:  Cooperative  Eye Contact:  Good  Psychomotor Behavior:  Appropriate  Affect:  Full range  Mood: euthymic  Hopelessness: Denies  Speech:  Normal  Thought Process:  Goal directed and Linear  Thought Content:  Normal  Suicidal:  None  Homicidal:  None  Hallucinations:  None  Delusion:  None  Memory:  Intact  Orientation:  Person, Place, Time and Situation  Reliability:  good  Insight:  Good  Judgement:  Good  Impulse Control:  Good  Physical/Medical Issues:  Yes patient has completed chemotherapy, bilateral mastectomy surgery, and physical  "therapy related to breast cancer diagnoses  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         Assessment & Plan   Diagnoses and all orders for this visit:    1. Attention deficit hyperactivity disorder, combined type (Primary)        Visit Diagnoses:    ICD-10-CM ICD-9-CM   1. Attention deficit hyperactivity disorder, combined type  F90.2 314.01       GOALS:  Short Term Goals: Patient will be compliant with medication, and patient will have no significant medication related side effects.  Patient will be engaged in psychotherapy as indicated.  Patient will report subjective improvement of symptoms.  Long term goals: To stabilize mood and treat/improve subjective symptoms, the patient will stay out of the hospital, the patient will be at an optimal level of functioning, and the patient will take all medications as prescribed.  The patient/guardian verbalized understanding and agreement with goals that were mutually set.    RISK ASSESSMENT  Current harm-to-self risk is reported by pt as \"none.\"  Current [id number] risk is reported by pt as \"none.\"   No suicidal thoughts, intent, plan is appreciated by this provider on this date of exam.   No homicidal thoughts, intent, plan is appreciated by this provider on this date.    TREATMENT PLAN: Continue supportive psychotherapy efforts and medications as indicated.   Pharmacological and Non-Pharmacological treatment options discussed during today's visit. Patient/Guardian acknowledged and verbally consented with current treatment plan and was educated on the importance of compliance with treatment and follow-up appointments.      MEDICATION ISSUES:  Discussed medication options and treatment plan of prescribed medication as well as the risks, benefits, any black box warnings, and side effects including potential falls, possible impaired driving, and metabolic adversities among others. Patient is agreeable to call the office with any worsening of " symptoms or onset of side effects, or if any concerns or questions arise.  The contact information for the office is made available to the patient. Patient is agreeable to call 911 or go to the nearest ER should they begin having any SI/HI, or if any urgent concerns arise. No medication side effects or related complaints today.     Continue Adderall XR 20 mg every morning  Call 1 week prior to needing refills       MEDS ORDERED DURING VISIT:  No orders of the defined types were placed in this encounter.       Follow Up Appointment:   Return in about 4 months (around 6/13/2024) for Recheck, Video visit.               This document has been electronically signed by MELONY Reich  February 19, 2024 16:29 EST    Please note that portions of this note were completed with a voice recognition program. Efforts were made to edit dictation, but occasionally words are mistranscribed.

## 2024-03-11 DIAGNOSIS — F90.2 ATTENTION DEFICIT HYPERACTIVITY DISORDER, COMBINED TYPE: ICD-10-CM

## 2024-03-11 RX ORDER — DEXTROAMPHETAMINE SACCHARATE, AMPHETAMINE ASPARTATE MONOHYDRATE, DEXTROAMPHETAMINE SULFATE AND AMPHETAMINE SULFATE 5; 5; 5; 5 MG/1; MG/1; MG/1; MG/1
20 CAPSULE, EXTENDED RELEASE ORAL EVERY MORNING
Qty: 30 CAPSULE | Refills: 0 | Status: SHIPPED | OUTPATIENT
Start: 2024-03-11

## 2024-04-15 DIAGNOSIS — F90.2 ATTENTION DEFICIT HYPERACTIVITY DISORDER, COMBINED TYPE: ICD-10-CM

## 2024-04-15 RX ORDER — DEXTROAMPHETAMINE SACCHARATE, AMPHETAMINE ASPARTATE MONOHYDRATE, DEXTROAMPHETAMINE SULFATE AND AMPHETAMINE SULFATE 6.25; 6.25; 6.25; 6.25 MG/1; MG/1; MG/1; MG/1
25 CAPSULE, EXTENDED RELEASE ORAL EVERY MORNING
Qty: 30 CAPSULE | Refills: 0 | Status: SHIPPED | OUTPATIENT
Start: 2024-04-15

## 2024-04-15 NOTE — PROGRESS NOTES
Pt contacted provider re: wanting to trial increased dosage again. States losing concentration, focus, more distractibility.

## 2024-05-17 DIAGNOSIS — F90.2 ATTENTION DEFICIT HYPERACTIVITY DISORDER, COMBINED TYPE: ICD-10-CM

## 2024-05-18 DIAGNOSIS — F90.2 ATTENTION DEFICIT HYPERACTIVITY DISORDER, COMBINED TYPE: ICD-10-CM

## 2024-05-20 RX ORDER — DEXTROAMPHETAMINE SACCHARATE, AMPHETAMINE ASPARTATE MONOHYDRATE, DEXTROAMPHETAMINE SULFATE AND AMPHETAMINE SULFATE 6.25; 6.25; 6.25; 6.25 MG/1; MG/1; MG/1; MG/1
25 CAPSULE, EXTENDED RELEASE ORAL EVERY MORNING
Qty: 30 CAPSULE | Refills: 0 | OUTPATIENT
Start: 2024-05-20

## 2024-05-20 RX ORDER — DEXTROAMPHETAMINE SACCHARATE, AMPHETAMINE ASPARTATE MONOHYDRATE, DEXTROAMPHETAMINE SULFATE AND AMPHETAMINE SULFATE 6.25; 6.25; 6.25; 6.25 MG/1; MG/1; MG/1; MG/1
25 CAPSULE, EXTENDED RELEASE ORAL EVERY MORNING
Qty: 30 CAPSULE | Refills: 0 | Status: SHIPPED | OUTPATIENT
Start: 2024-05-20

## 2024-06-13 ENCOUNTER — TELEMEDICINE (OUTPATIENT)
Dept: PSYCHIATRY | Facility: CLINIC | Age: 39
End: 2024-06-13
Payer: COMMERCIAL

## 2024-06-13 DIAGNOSIS — F90.2 ATTENTION DEFICIT HYPERACTIVITY DISORDER, COMBINED TYPE: ICD-10-CM

## 2024-06-13 RX ORDER — DEXTROAMPHETAMINE SACCHARATE, AMPHETAMINE ASPARTATE MONOHYDRATE, DEXTROAMPHETAMINE SULFATE AND AMPHETAMINE SULFATE 6.25; 6.25; 6.25; 6.25 MG/1; MG/1; MG/1; MG/1
25 CAPSULE, EXTENDED RELEASE ORAL EVERY MORNING
Qty: 30 CAPSULE | Refills: 0 | Status: SHIPPED | OUTPATIENT
Start: 2024-06-13

## 2024-06-13 NOTE — PROGRESS NOTES
"This provider is located at [facility], [address] [place] [address], [facility], [address] using a secure [employer] Video Visit through [employer]. Patient is being seen remotely via telehealth  in [facility], and stated they are in a secure environment for this session. The patient's condition being diagnosed/treated is appropriate for telemedicine. The provider identified herself as well as her credentials.   The patient, and/or patients guardian, consent to be seen remotely, and when consent is given they understand that the consent allows for patient identifiable information to be sent to a third party as needed.   They may refuse to be seen remotely at any time. The electronic data is encrypted and password protected, and the patient and/or guardian has been advised of the potential risks to privacy not withstanding such measures.   PT Identifiers used: Name and .    You have chosen to receive care through a telehealth visit.  Do you consent to use a video/audio connection for your medical care today? Yes  Patient verbally confirmed consent for today's encounter  2024      Subjective   Christie Griffith is a 39 y.o. female who presents today for follow up for medication management    Chief Complaint:  \"ADHD\"     History of Present Illness:    History of Present Illness  Patient reported for appointment from her home.  Patient reports she is going to have a bilateral oophorectomy in about a month.  This was advised by her oncologist due to the estrogen receptive properties of the breast cancer.  She will have this done at [facility] in [place] Dr. Yancy Quan.  Patient reports continues with the Lupron injections from oncology.  This has increased her appetite.  She reports she is maintaining her weight between 165 and 168.  Looking forward to traveling to [place] in a couple of weeks with her daughter who is in a softball tournament there.  Patient reports finishing out the school year, focus and " concentration is good with the medication.  No side effects are reported. Patient is a fourth grade .  Her ADHD symptoms include difficulty with maintaining focus and concentration for tasks, easily distracted, forgetfulness.           Last Menstrual Period:  Lupron injections every 3 mos    The following portions of the patient's history were reviewed and updated as appropriate: allergies, current medications, past family history, past medical history, past social history, past surgical history and problem list.    Past Psychiatric History:  Patient reports only taking the Wellbutrin, no history of admissions, no history of self harming behaviors, no SI, HI, hallucinations or psychosis.  She reports she did have postpartum depression and managed to work through it but it was very difficult.    Past Medical History:  Past Medical History:   Diagnosis Date    ADHD (attention deficit hyperactivity disorder) 7-22    Generalized anxiety disorder     Malignant neoplasm of upper-outer quadrant of right female breast 02/03/2023    PONV (postoperative nausea and vomiting)     Psoriasis     Varicella 1990       Substance Abuse History:   Types:Denies all, including illicit      Social History:  Social History     Socioeconomic History    Marital status:      Spouse name: Aurelio    Number of children: 1    Highest education level: Master's degree (e.g., MA, MS, Emma, MEd, MSW, JUSTINA)   Tobacco Use    Smoking status: Never    Smokeless tobacco: Never   Vaping Use    Vaping status: Never Used   Substance and Sexual Activity    Alcohol use: Not Currently    Drug use: Never    Sexual activity: Yes     Partners: Male     Birth control/protection: I.U.D., Implant   Patient reports good support system with family and friends.  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  Reports [employer] belief system.  Denies any  history of  service or any legal issues.    Family History:  Family History   Problem Relation Age of Onset    Lymphoma Mother     Diabetes Mother     Heart disease Mother     Coronary artery disease Father     Atrial fibrillation Father     Heart disease Father     Thyroid disease Other     Heart murmur Daughter        Past Surgical History:  Past Surgical History:   Procedure Laterality Date    BILATERAL BREAST REDUCTION  01/2009    MASTECTOMY Bilateral 07/26/2023    MOUTH SURGERY      x 4 dental implants    REDUCTION MAMMAPLASTY      VENOUS ACCESS DEVICE (PORT) INSERTION Left 02/16/2023    Procedure: MEDIPORT PLACEMENT              (C-ARM#2);  Surgeon: Bruce Charles MD;  Location: [facility];  Service: General;  Laterality: Left;    WISDOM TOOTH EXTRACTION         Problem List:  Patient Active Problem List   Diagnosis    Mass of upper outer quadrant of right breast    Malignant neoplasm of upper-outer quadrant of right female breast    Other long term (current) drug therapy    Encounter for venous access device care    CINV (chemotherapy-induced nausea and vomiting)    Skin rash    Mucositis due to antineoplastic therapy    Palmar plantar erythrodysaesthesia due to cytotoxic therapy    Anxiety       Allergy:   Allergies   Allergen Reactions    Emend [Fosaprepitant] Shortness Of Breath    Transderm-Scop [Scopolamine] Dizziness        Current Medications:   Current Outpatient Medications   Medication Sig Dispense Refill    amphetamine-dextroamphetamine XR (ADDERALL XR) 25 MG 24 hr capsule Take 1 capsule by mouth Every Morning 30 capsule 0    exemestane (AROMASIN) 25 MG tablet Take 1 tablet by mouth Daily. 90 tablet 0     No current facility-administered medications for this visit.       Review of Systems:    Review of Systems   All other systems reviewed and are negative.        Physical Exam:   Physical Exam  Vitals reviewed.   Constitutional:       Appearance: Normal appearance. She is well-developed  and well-groomed.   HENT:      Head: Normocephalic.   Neurological:      Mental Status: She is alert.   Psychiatric:         Attention and Perception: Attention and perception normal.         Mood and Affect: Mood and affect normal.         Speech: Speech normal.         Behavior: Behavior normal. Behavior is cooperative.         Thought Content: Thought content normal.         Cognition and Memory: Cognition normal.         Judgment: Judgment normal.      Comments:           Vitals:  not currently breastfeeding. There is no height or weight on file to calculate BMI.  Due to extenuating circumstances and possible current health risks associated with the patient being present in a clinical setting (with current health restrictions in place in regards to possible COVID 19 transmission/exposure), the patient was seen remotely today via a MyChart Video Visit through [facility] and telephone encounter.  Unable to obtain vital signs due to nature of remote visit.  Height stated at 67 inches.  Weight stated at 165 pounds.    Last 3 Blood Pressure Readings:  BP Readings from Last 3 Encounters:   08/16/23 131/86   08/11/23 142/68   08/09/23 166/78   Blood pressure reading from visit with Dr. Quan at [facility] was 130/80 June 2024      Mental Status Exam:   Hygiene:   good  Cooperation:  Cooperative  Eye Contact:  Good  Psychomotor Behavior:  Appropriate  Affect:  Full range  Mood: euthymic  Hopelessness: Denies  Speech:  Normal  Thought Process:  Goal directed and Linear  Thought Content:  Normal  Suicidal:  None  Homicidal:  None  Hallucinations:  None  Delusion:  None  Memory:  Intact  Orientation:  Person, Place, Time and Situation  Reliability:  good  Insight:  Good  Judgement:  Good  Impulse Control:  Good  Physical/Medical Issues:  Yes patient has completed chemotherapy, bilateral mastectomy surgery, and physical therapy related to breast cancer diagnoses  patient was hospitalized January 2024 for Popeye Quan  "syndrome.  Patient will be undergoing bilateral oophorectomy on July 10, 2024         Assessment & Plan   Diagnoses and all orders for this visit:    1. Attention deficit hyperactivity disorder, combined type  -     amphetamine-dextroamphetamine XR (ADDERALL XR) 25 MG 24 hr capsule; Take 1 capsule by mouth Every Morning  Dispense: 30 capsule; Refill: 0          Visit Diagnoses:    ICD-10-CM ICD-9-CM   1. Attention deficit hyperactivity disorder, combined type  F90.2 314.01         GOALS:  Short Term Goals: Patient will be compliant with medication, and patient will have no significant medication related side effects.  Patient will be engaged in psychotherapy as indicated.  Patient will report subjective improvement of symptoms.  Long term goals: To stabilize mood and treat/improve subjective symptoms, the patient will stay out of the hospital, the patient will be at an optimal level of functioning, and the patient will take all medications as prescribed.  The patient/guardian verbalized understanding and agreement with goals that were mutually set.    RISK ASSESSMENT  Current harm-to-self risk is reported by pt as \"none.\"  Current axfw-[id number]-[id number] risk is reported by pt as \"none.\"   No suicidal thoughts, intent, plan is appreciated by this provider on this date of exam.   No homicidal thoughts, intent, plan is appreciated by this provider on this date.    TREATMENT PLAN: Continue supportive psychotherapy efforts and medications as indicated.   Pharmacological and Non-Pharmacological treatment options discussed during today's visit. Patient/Guardian acknowledged and verbally consented with current treatment plan and was educated on the importance of compliance with treatment and follow-up appointments.      MEDICATION ISSUES:  Discussed medication options and treatment plan of prescribed medication as well as the risks, benefits, any black box warnings, and side effects including potential falls, possible impaired " driving, and metabolic adversities among others. Patient is agreeable to call the office with any worsening of symptoms or onset of side effects, or if any concerns or questions arise.  The contact information for the office is made available to the patient. Patient is agreeable to call 911 or go to the nearest ER should they begin having any SI/HI, or if any urgent concerns arise. No medication side effects or related complaints today.     Continue Adderall XR 25 mg every morning  Call 1 week prior to needing refills       MEDS ORDERED DURING VISIT:  New Medications Ordered This Visit   Medications    amphetamine-dextroamphetamine XR (ADDERALL XR) 25 MG 24 hr capsule     Sig: Take 1 capsule by mouth Every Morning     Dispense:  30 capsule     Refill:  0     Fill When due        Follow Up Appointment:   Return in about 3 months (around 9/18/2024) for Recheck, Video visit.               This document has been electronically signed by MELONY Reich  June 13, 2024 11:29 EDT    Please note that portions of this note were completed with a voice recognition program. Efforts were made to edit dictation, but occasionally words are mistranscribed.

## 2024-06-13 NOTE — PATIENT INSTRUCTIONS
For concerns or needing assistance call the Behavioral [facility] at [phone]  Continue Adderall XR 25 mg every morning  Call 1 week prior to needing refills

## 2024-08-05 DIAGNOSIS — F90.2 ATTENTION DEFICIT HYPERACTIVITY DISORDER, COMBINED TYPE: ICD-10-CM

## 2024-08-05 RX ORDER — DEXTROAMPHETAMINE SACCHARATE, AMPHETAMINE ASPARTATE MONOHYDRATE, DEXTROAMPHETAMINE SULFATE AND AMPHETAMINE SULFATE 6.25; 6.25; 6.25; 6.25 MG/1; MG/1; MG/1; MG/1
25 CAPSULE, EXTENDED RELEASE ORAL EVERY MORNING
Qty: 30 CAPSULE | Refills: 0 | Status: SHIPPED | OUTPATIENT
Start: 2024-08-05

## 2024-09-09 DIAGNOSIS — F90.2 ATTENTION DEFICIT HYPERACTIVITY DISORDER, COMBINED TYPE: ICD-10-CM

## 2024-09-09 RX ORDER — DEXTROAMPHETAMINE SACCHARATE, AMPHETAMINE ASPARTATE MONOHYDRATE, DEXTROAMPHETAMINE SULFATE AND AMPHETAMINE SULFATE 6.25; 6.25; 6.25; 6.25 MG/1; MG/1; MG/1; MG/1
25 CAPSULE, EXTENDED RELEASE ORAL EVERY MORNING
Qty: 30 CAPSULE | Refills: 0 | Status: SHIPPED | OUTPATIENT
Start: 2024-09-09

## 2024-09-18 ENCOUNTER — TELEMEDICINE (OUTPATIENT)
Dept: PSYCHIATRY | Facility: CLINIC | Age: 39
End: 2024-09-18
Payer: COMMERCIAL

## 2024-09-18 DIAGNOSIS — F90.2 ATTENTION DEFICIT HYPERACTIVITY DISORDER, COMBINED TYPE: Primary | ICD-10-CM

## 2024-10-14 DIAGNOSIS — F90.2 ATTENTION DEFICIT HYPERACTIVITY DISORDER, COMBINED TYPE: ICD-10-CM

## 2024-10-14 RX ORDER — DEXTROAMPHETAMINE SACCHARATE, AMPHETAMINE ASPARTATE MONOHYDRATE, DEXTROAMPHETAMINE SULFATE AND AMPHETAMINE SULFATE 6.25; 6.25; 6.25; 6.25 MG/1; MG/1; MG/1; MG/1
25 CAPSULE, EXTENDED RELEASE ORAL EVERY MORNING
Qty: 30 CAPSULE | Refills: 0 | Status: SHIPPED | OUTPATIENT
Start: 2024-10-14

## 2024-11-13 DIAGNOSIS — F90.2 ATTENTION DEFICIT HYPERACTIVITY DISORDER, COMBINED TYPE: ICD-10-CM

## 2024-11-13 RX ORDER — DEXTROAMPHETAMINE SACCHARATE, AMPHETAMINE ASPARTATE MONOHYDRATE, DEXTROAMPHETAMINE SULFATE AND AMPHETAMINE SULFATE 6.25; 6.25; 6.25; 6.25 MG/1; MG/1; MG/1; MG/1
25 CAPSULE, EXTENDED RELEASE ORAL EVERY MORNING
Qty: 30 CAPSULE | Refills: 0 | Status: SHIPPED | OUTPATIENT
Start: 2024-11-13

## 2024-12-17 DIAGNOSIS — F90.2 ATTENTION DEFICIT HYPERACTIVITY DISORDER, COMBINED TYPE: ICD-10-CM

## 2024-12-17 RX ORDER — DEXTROAMPHETAMINE SACCHARATE, AMPHETAMINE ASPARTATE MONOHYDRATE, DEXTROAMPHETAMINE SULFATE AND AMPHETAMINE SULFATE 6.25; 6.25; 6.25; 6.25 MG/1; MG/1; MG/1; MG/1
25 CAPSULE, EXTENDED RELEASE ORAL EVERY MORNING
Qty: 30 CAPSULE | Refills: 0 | Status: SHIPPED | OUTPATIENT
Start: 2024-12-17

## 2025-01-02 ENCOUNTER — TELEMEDICINE (OUTPATIENT)
Dept: PSYCHIATRY | Facility: CLINIC | Age: 40
End: 2025-01-02
Payer: COMMERCIAL

## 2025-01-02 DIAGNOSIS — F90.2 ATTENTION DEFICIT HYPERACTIVITY DISORDER, COMBINED TYPE: Primary | ICD-10-CM

## 2025-01-02 DIAGNOSIS — Z79.899 ENCOUNTER FOR LONG-TERM (CURRENT) USE OF MEDICATIONS: ICD-10-CM

## 2025-01-02 RX ORDER — LOSARTAN POTASSIUM 50 MG/1
75 TABLET ORAL DAILY
COMMUNITY
Start: 2024-11-11 | End: 2025-02-10

## 2025-01-02 RX ORDER — DESONIDE 0.5 MG/G
OINTMENT TOPICAL
COMMUNITY
Start: 2024-12-02 | End: 2025-12-03

## 2025-01-02 RX ORDER — PAROXETINE 10 MG/1
10 TABLET, FILM COATED ORAL
COMMUNITY
Start: 2024-09-18 | End: 2025-09-19

## 2025-01-02 NOTE — PROGRESS NOTES
" Mode of Visit: Video  Location of patient: -WORK-  Location of provider: +HOME+  You have chosen to receive care through a telehealth visit.  The patient has signed the video visit consent form.  The visit included audio and video interaction. No technical issues occurred during this visit.    You have chosen to receive care through a telehealth visit.  Do you consent to use a video/audio connection for your medical care today? Yes  Patient verbally confirmed consent for today's encounter  01/02/2025      Subjective   Christie Griffith is a 39 y.o. female who presents today for follow up for medication management    Chief Complaint:  \"ADHD\"     History of Present Illness:    History of Present Illness  Patient reported from her new work site.  She reports she now works for the [employer] office in [place] which is located in the [facility] in [place].  She reports she started working there in October.  She is the clinical rotations coordinator.  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  She also reports that she had been diagnosed with PTSD from medical trauma by another provider.  She is currently on Paxil 10 mg daily by that provider.  She remains on her estrogen blocking medication from oncology.  She is due for her yearly urine drug screen and is agreeable to this.  I did place the order and staff was notified to fax that order over to the [facility].  She reports that the Adderall currently at 25 mg XR daily is effective for her symptoms of ADHD.  She is able to maintain focus and concentration, she is less distracted and memory continues to improve.  She reports that changing careers has been very beneficial for her mental health overall.  She can clock out for the day and be done.             The following portions of the patient's history were reviewed and updated as appropriate: allergies, current medications, past family history, past " medical history, past social history, past surgical history and problem list.    Past Psychiatric History:  Patient reports only taking the Wellbutrin, no history of admissions, no history of self harming behaviors, no SI, HI, hallucinations or psychosis.  She reports she did have postpartum depression and managed to work through it but it was very difficult.    Past Medical History:  Past Medical History:   Diagnosis Date    ADHD (attention deficit hyperactivity disorder) 7-22    Generalized anxiety disorder     Malignant neoplasm of upper-outer quadrant of right female breast 02/03/2023    PONV (postoperative nausea and vomiting)     Psoriasis     Varicella 1990       Substance Abuse History:   Types:Denies all, including illicit      Social History:  Social History     Socioeconomic History    Marital status:      Spouse name: Aurelio    Number of children: 1    Highest education level: Master's degree (e.g., MA, MS, Emma, MEd, MSW, JUSTINA)   Tobacco Use    Smoking status: Never    Smokeless tobacco: Never   Vaping Use    Vaping status: Never Used   Substance and Sexual Activity    Alcohol use: Not Currently    Drug use: Never    Sexual activity: Yes     Partners: Male     Birth control/protection: Post-menopausal   Patient reports good support system with family and friends.  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  Reports Quaker belief system.  Denies any history of  service or any legal issues.    Family History:  Family History   Problem Relation Age of Onset    Lymphoma Mother     Diabetes Mother     Heart disease Mother     Coronary artery disease Father     Atrial fibrillation Father     Heart disease Father     Thyroid disease Other     Heart murmur Daughter        Past Surgical History:  Past Surgical History:   Procedure Laterality Date    BILATERAL BREAST REDUCTION  01/2009    MASTECTOMY Bilateral 07/26/2023    MOUTH SURGERY      x 4  dental implants    REDUCTION MAMMAPLASTY      VENOUS ACCESS DEVICE (PORT) INSERTION Left 02/16/2023    Procedure: MEDIPORT PLACEMENT              (C-ARM#2);  Surgeon: Bruce Charles MD;  Location: [facility];  Service: General;  Laterality: Left;    WISDOM TOOTH EXTRACTION         Problem List:  Patient Active Problem List   Diagnosis    Mass of upper outer quadrant of right breast    Malignant neoplasm of upper-outer quadrant of right female breast    Other long term (current) drug therapy    Encounter for venous access device care    CINV (chemotherapy-induced nausea and vomiting)    Skin rash    Mucositis due to antineoplastic therapy    Palmar plantar 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      exemestane (AROMASIN) 25 MG tablet Take 1 tablet by mouth Daily. 90 tablet 0    losartan (COZAAR) 50 MG tablet Take 1.5 tablets by mouth Daily.      PARoxetine (PAXIL) 10 MG tablet Take 1 tablet by mouth.       No current facility-administered medications for this visit.       Review of Systems:    Review of Systems   All other systems reviewed and are negative.        Physical Exam:   Physical Exam  Vitals reviewed.   Constitutional:       Appearance: Normal appearance. She is well-developed and well-groomed.   HENT:      Head: Normocephalic.   Neurological:      Mental Status: She is alert.   Psychiatric:         Attention and Perception: Attention and perception normal.         Mood and Affect: Mood and affect normal.         Speech: Speech normal.         Behavior:  Behavior normal. Behavior is cooperative.         Thought Content: Thought content normal.         Cognition and Memory: Cognition normal.         Judgment: Judgment normal.      Comments:           Vitals:  not currently breastfeeding. There is no height or weight on file to calculate BMI.  Due to extenuating circumstances and possible current health risks associated with the patient being present in a clinical setting (with current health restrictions in place in regards to possible COVID 19 transmission/exposure), the patient was seen remotely today via a MyChart Video Visit through [facility] and telephone encounter.  Unable to obtain vital signs due to nature of remote visit.  Height stated at 67 inches.  Weight stated at 162 pounds.    Last 3 Blood Pressure Readings:  BP Readings from Last 3 Encounters:   08/16/23 131/86   08/11/23 142/68   08/09/23 166/78   Blood pressure reading from visit with Dr. Quan at [facility] was 130/80 June 2024  Blood pressure reading from visit with oncologist September 2024 was elevated at 172/102.  It is noted that this was a manual reading.  Blood pressure 146/84 November 2024 visit with cardiologist    Mental Status Exam:   Hygiene:   good  Cooperation:  Cooperative  Eye Contact:  Good  Psychomotor Behavior:  Appropriate  Affect:  Full range  Mood: euthymic  Hopelessness: Denies  Speech:  Normal  Thought Process:  Goal directed and Linear  Thought Content:  Normal  Suicidal:  None  Homicidal:  None  Hallucinations:  None  Delusion:  None  Memory:  Intact  Orientation:  Person, Place, Time and Situation  Reliability:  good  Insight:  Good  Judgement:  Good  Impulse Control:  Good  Physical/Medical Issues:  Yes patient has completed chemotherapy, bilateral mastectomy surgery, and physical therapy related to breast cancer diagnoses  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  Patient underwent a bilateral oophorectomy July 10, 2024  PTSD symptoms from  "medical trauma identified by PCP per pt report. Exacerbation of HTN which has gotten better with job change and with medication.          Assessment & Plan   Diagnoses and all orders for this visit:    1. Attention deficit hyperactivity disorder, combined type (Primary)    2. Encounter for long-term (current) use of medications  -     Urine Drug Screen - Urine, Clean Catch              Visit Diagnoses:    ICD-10-CM ICD-9-CM   1. Attention deficit hyperactivity disorder, combined type  F90.2 314.01   2. Encounter for long-term (current) use of medications  Z79.899 V58.69             GOALS:  Short Term Goals: Patient will be compliant with medication, and patient will have no significant medication related side effects.  Patient will be engaged in psychotherapy as indicated.  Patient will report subjective improvement of symptoms.  Long term goals: To stabilize mood and treat/improve subjective symptoms, the patient will stay out of the hospital, the patient will be at an optimal level of functioning, and the patient will take all medications as prescribed.  The patient/guardian verbalized understanding and agreement with goals that were mutually set.    RISK ASSESSMENT  Current harm-to-self risk is reported by pt as \"none.\"  Current [id number] risk is reported by pt as \"none.\"   No suicidal thoughts, intent, plan is appreciated by this provider on this date of exam.   No homicidal thoughts, intent, plan is appreciated by this provider on this date.    TREATMENT PLAN: Continue supportive psychotherapy efforts and medications as indicated.   Pharmacological and Non-Pharmacological treatment options discussed during today's visit. Patient/Guardian acknowledged and verbally consented with current treatment plan and was educated on the importance of compliance with treatment and follow-up appointments.      MEDICATION ISSUES:  Discussed medication options and treatment plan of prescribed medication as well as the " risks, benefits, any black box warnings, and side effects including potential falls, possible impaired driving, and metabolic adversities among others. Patient is agreeable to call the office with any worsening of symptoms or onset of side effects, or if any concerns or questions arise.  The contact information for the office is made available to the patient. Patient is agreeable to call 911 or go to the nearest ER should they begin having any SI/HI, or if any urgent concerns arise. No medication side effects or related complaints today.     Continue Adderall XR 25 mg every morning  Call 1 week prior to needing refills  UDS ordered and faxed to [facility] clinic lab. Pt was instructed.        MEDS ORDERED DURING VISIT:  No orders of the defined types were placed in this encounter.       Follow Up Appointment:   Return in about 3 months (around 4/8/2025) for Recheck, Video visit.               This document has been electronically signed by MELONY Reich  January 2, 2025 11:32 EST    Please note that portions of this note were completed with a voice recognition program. Efforts were made to edit dictation, but occasionally words are mistranscribed.

## 2025-01-23 DIAGNOSIS — F90.2 ATTENTION DEFICIT HYPERACTIVITY DISORDER, COMBINED TYPE: ICD-10-CM

## 2025-01-23 RX ORDER — DEXTROAMPHETAMINE SACCHARATE, AMPHETAMINE ASPARTATE MONOHYDRATE, DEXTROAMPHETAMINE SULFATE AND AMPHETAMINE SULFATE 6.25; 6.25; 6.25; 6.25 MG/1; MG/1; MG/1; MG/1
25 CAPSULE, EXTENDED RELEASE ORAL EVERY MORNING
Qty: 30 CAPSULE | Refills: 0 | Status: SHIPPED | OUTPATIENT
Start: 2025-01-23

## 2025-02-25 DIAGNOSIS — F90.2 ATTENTION DEFICIT HYPERACTIVITY DISORDER, COMBINED TYPE: ICD-10-CM

## 2025-02-25 RX ORDER — DEXTROAMPHETAMINE SACCHARATE, AMPHETAMINE ASPARTATE MONOHYDRATE, DEXTROAMPHETAMINE SULFATE AND AMPHETAMINE SULFATE 6.25; 6.25; 6.25; 6.25 MG/1; MG/1; MG/1; MG/1
25 CAPSULE, EXTENDED RELEASE ORAL EVERY MORNING
Qty: 30 CAPSULE | Refills: 0 | Status: SHIPPED | OUTPATIENT
Start: 2025-02-25

## 2025-03-27 DIAGNOSIS — F90.2 ATTENTION DEFICIT HYPERACTIVITY DISORDER, COMBINED TYPE: ICD-10-CM

## 2025-03-27 RX ORDER — DEXTROAMPHETAMINE SACCHARATE, AMPHETAMINE ASPARTATE MONOHYDRATE, DEXTROAMPHETAMINE SULFATE AND AMPHETAMINE SULFATE 6.25; 6.25; 6.25; 6.25 MG/1; MG/1; MG/1; MG/1
25 CAPSULE, EXTENDED RELEASE ORAL EVERY MORNING
Qty: 30 CAPSULE | Refills: 0 | Status: SHIPPED | OUTPATIENT
Start: 2025-03-27

## 2025-04-08 ENCOUNTER — TELEMEDICINE (OUTPATIENT)
Dept: PSYCHIATRY | Facility: CLINIC | Age: 40
End: 2025-04-08
Payer: COMMERCIAL

## 2025-04-08 DIAGNOSIS — T88.7XXA MEDICATION SIDE EFFECTS: ICD-10-CM

## 2025-04-08 DIAGNOSIS — Z51.81 MEDICATION MONITORING ENCOUNTER: ICD-10-CM

## 2025-04-08 DIAGNOSIS — F41.9 ANXIETY: ICD-10-CM

## 2025-04-08 DIAGNOSIS — F90.2 ATTENTION DEFICIT HYPERACTIVITY DISORDER, COMBINED TYPE: Primary | ICD-10-CM

## 2025-04-08 RX ORDER — FLUOXETINE 10 MG/1
10 CAPSULE ORAL DAILY
Qty: 30 CAPSULE | Refills: 2 | Status: SHIPPED | OUTPATIENT
Start: 2025-04-08

## 2025-04-08 NOTE — PROGRESS NOTES
" Mode of Visit: Video  Location of patient: -OTHER-: Sitting in car in parking lot of Private Practice off [facility] in [place]  Location of provider: +HOME+  You have chosen to receive care through a telehealth visit.  The patient has signed the video visit consent form.  The visit included audio and video interaction. No technical issues occurred during this visit.     Patient verbally confirmed consent for today's encounter  04/08/2025      Subjective   Christie Griffith is a 39 y.o. female who presents today for follow up for medication management    Chief Complaint:  \"ADHD\"     History of Present Illness:    History of Present Illness  Patient reports she still loves her job.  She feels like the Adderall is beneficial for her focus and concentration.  She is not as distracted.  However she is reporting that she could eat a horse all day long.  Ferocious appetite since starting the Paxil prescribed by primary care provider.  This was prescribed to help with symptoms of surgical menopause, anxiety r/t medical trauma/PTSD.  Patient reports she does exercise 3 days a week.   She remains on her estrogen blocking medication from oncology.  Patient was agreeable to switch the Paxil to Prozac, I did run a drug interaction check with her estrogen blocking medication and there were no interactions noted.  I also notified primary care provider of the change.             The following portions of the patient's history were reviewed and updated as appropriate: allergies, current medications, past family history, past medical history, past social history, past surgical history and problem list.    Past Psychiatric History:  Patient reports only taking the Wellbutrin, no history of admissions, no history of self harming behaviors, no SI, HI, hallucinations or psychosis.  She reports she did have postpartum depression and managed to work through it but it was very difficult.    Past Medical History:  Past Medical " History:   Diagnosis Date    ADHD (attention deficit hyperactivity disorder) 7-22    Generalized anxiety disorder     Heart murmur     Malignant neoplasm of upper-outer quadrant of right female breast 02/03/2023    PONV (postoperative nausea and vomiting)     Psoriasis     Urinary tract infection     Varicella 1990    Visual impairment        Substance Abuse History:   Types:Denies all, including illicit      Social History:  Social History     Socioeconomic History    Marital status:      Spouse name: Aurelio    Number of children: 1    Highest education level: Master's degree (e.g., MA, MS, Emma, MEd, MSW, JUSTINA)   Tobacco Use    Smoking status: Never    Smokeless tobacco: Never   Vaping Use    Vaping status: Never Used   Substance and Sexual Activity    Alcohol use: Not Currently    Drug use: Never    Sexual activity: Yes     Partners: Male     Birth control/protection: Post-menopausal   Patient reports good support system with family and friends.  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  Reports Druze belief system.  Denies any history of  service or any legal issues.    Family History:  Family History   Problem Relation Age of Onset    Lymphoma Mother     Diabetes Mother     Heart disease Mother     Cancer Mother     Coronary artery disease Father     Atrial fibrillation Father     Heart disease Father     Diabetes Father     Thyroid disease Other     Heart murmur Daughter        Past Surgical History:  Past Surgical History:   Procedure Laterality Date    BILATERAL BREAST REDUCTION  01/2009    BREAST BIOPSY  2/1/23    Right    MASTECTOMY Bilateral 07/26/2023    MOUTH SURGERY      x 4 dental implants    REDUCTION MAMMAPLASTY      VENOUS ACCESS DEVICE (PORT) INSERTION Left 02/16/2023    Procedure: MEDIPORT PLACEMENT              (C-ARM#2);  Surgeon: Bruce Charles MD;  Location: [facility];  Service: General;  Laterality: Left;    WISDOM TOOTH  EXTRACTION         Problem List:  Patient Active Problem List   Diagnosis    Mass of upper outer quadrant of right breast    Malignant neoplasm of upper-outer quadrant of right female breast    Other long term (current) drug therapy    Encounter for venous access device care    CINV (chemotherapy-induced nausea and vomiting)    Skin rash    Mucositis due to antineoplastic therapy    Palmar plantar 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      exemestane (AROMASIN) 25 MG tablet Take 1 tablet by mouth Daily. 90 tablet 0    FLUoxetine (PROzac) 10 MG capsule Take 1 capsule by mouth Daily. 30 capsule 2    losartan (COZAAR) 50 MG tablet Take 1.5 tablets by mouth Daily.       No current facility-administered medications for this visit.       Review of Systems:    Review of Systems   All other systems reviewed and are negative.        Physical Exam:   Physical Exam  Vitals reviewed.   Constitutional:       Appearance: Normal appearance. She is well-developed and well-groomed.   HENT:      Head: Normocephalic.   Neurological:      Mental Status: She is alert.   Psychiatric:         Attention and Perception: Attention and perception normal.         Mood and Affect: Mood and affect normal.         Speech: Speech normal.         Behavior: Behavior normal. Behavior is cooperative.         Thought Content: Thought content normal.         Cognition and Memory: Cognition normal.         Judgment: Judgment normal.      Comments:           Vitals:  not currently breastfeeding. There is  no height or weight on file to calculate BMI.  Due to extenuating circumstances and possible current health risks associated with the patient being present in a clinical setting (with current health restrictions in place in regards to possible COVID 19 transmission/exposure), the patient was seen remotely today via a MyChart Video Visit through [facility] and telephone encounter.  Unable to obtain vital signs due to nature of remote visit.  Height stated at 67 inches.  Weight stated at 175 pounds.    Last 3 Blood Pressure Readings:  BP Readings from Last 3 Encounters:   08/16/23 131/86   08/11/23 142/68   08/09/23 166/78   Blood pressure reading from visit with Dr[facility] Quan at [facility] was 130/80 June 2024  Blood pressure reading from visit with oncologist September 2024 was elevated at 172/102.  It is noted that this was a manual reading.  Blood pressure 146/84 November 2024 visit with cardiologist  Blood pressure 130/80 March 4, 2025 at Dr. Ellie Quan office        Mental Status Exam:   Hygiene:   good  Cooperation:  Cooperative  Eye Contact:  Good  Psychomotor Behavior:  Appropriate  Affect:  Full range  Mood: euthymic  Hopelessness: Denies  Speech:  Normal  Thought Process:  Goal directed and Linear  Thought Content:  Normal  Suicidal:  None  Homicidal:  None  Hallucinations:  None  Delusion:  None  Memory:  Intact  Orientation:  Person, Place, Time and Situation  Reliability:  good  Insight:  Good  Judgement:  Good  Impulse Control:  Good  Physical/Medical Issues:  Yes patient has completed chemotherapy, bilateral mastectomy surgery, and physical therapy related to breast cancer diagnoses  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  Patient underwent a bilateral oophorectomy July 10, 2024  PTSD symptoms from medical trauma identified by PCP per pt report. Exacerbation of HTN which has gotten better with job change and with medication.          Assessment & Plan   Diagnoses and all  "orders for this visit:    1. Attention deficit hyperactivity disorder, combined type (Primary)    2. Anxiety  -     FLUoxetine (PROzac) 10 MG capsule; Take 1 capsule by mouth Daily.  Dispense: 30 capsule; Refill: 2    3. Medication side effects    4. Medication monitoring encounter    Other orders  -     LABS SCANNED                Visit Diagnoses:    ICD-10-CM ICD-9-CM   1. Attention deficit hyperactivity disorder, combined type  F90.2 314.01   2. Anxiety  F41.9 300.00   3. Medication side effects  T88.7XXA 995.20   4. Medication monitoring encounter  Z51.81 V58.83               GOALS:  Short Term Goals: Patient will be compliant with medication, and patient will have no significant medication related side effects.  Patient will be engaged in psychotherapy as indicated.  Patient will report subjective improvement of symptoms.  Long term goals: To stabilize mood and treat/improve subjective symptoms, the patient will stay out of the hospital, the patient will be at an optimal level of functioning, and the patient will take all medications as prescribed.  The patient/guardian verbalized understanding and agreement with goals that were mutually set.    RISK ASSESSMENT  Current harm-to-self risk is reported by pt as \"none.\"  Current [id number] risk is reported by pt as \"none.\"   No suicidal thoughts, intent, plan is appreciated by this provider on this date of exam.   No homicidal thoughts, intent, plan is appreciated by this provider on this date.    TREATMENT PLAN: Continue supportive psychotherapy efforts and medications as indicated.   Pharmacological and Non-Pharmacological treatment options discussed during today's visit. Patient/Guardian acknowledged and verbally consented with current treatment plan and was educated on the importance of compliance with treatment and follow-up appointments.      MEDICATION ISSUES:  Discussed medication options and treatment plan of prescribed medication as well as the risks, " benefits, any black box warnings, and side effects including potential falls, possible impaired driving, and metabolic adversities among others. Patient is agreeable to call the office with any worsening of symptoms or onset of side effects, or if any concerns or questions arise.  The contact information for the office is made available to the patient. Patient is agreeable to call 911 or go to the nearest ER should they begin having any SI/HI, or if any urgent concerns arise. No medication side effects or related complaints today.     Continue Adderall XR 25 mg every morning  Call 1 week prior to needing refills   TAKE paxil every other day x 2 weeks when starting prozac daily   After 2 weeks taking prozac daily, may d/c paxil.        MEDS ORDERED DURING VISIT:  New Medications Ordered This Visit   Medications    FLUoxetine (PROzac) 10 MG capsule     Sig: Take 1 capsule by mouth Daily.     Dispense:  30 capsule     Refill:  2     Weaning from paxil        Follow Up Appointment:   Return in about 5 weeks (around 5/14/2025) for Recheck, Video visit.               This document has been electronically signed by MELONY Reich  April 8, 2025 17:51 EDT    Please note that portions of this note were completed with a voice recognition program. Efforts were made to edit dictation, but occasionally words are mistranscribed.

## 2025-04-08 NOTE — PATIENT INSTRUCTIONS
For concerns or needing assistance call the [facility] at [phone]  Continue Adderall XR 25 mg every morning  Call 1 week prior to needing refills   TAKE paxil every other day x 2 weeks when starting prozac daily   After 2 weeks taking prozac daily, may d/c paxil.

## 2025-04-28 DIAGNOSIS — F90.2 ATTENTION DEFICIT HYPERACTIVITY DISORDER, COMBINED TYPE: ICD-10-CM

## 2025-04-28 RX ORDER — DEXTROAMPHETAMINE SACCHARATE, AMPHETAMINE ASPARTATE MONOHYDRATE, DEXTROAMPHETAMINE SULFATE AND AMPHETAMINE SULFATE 6.25; 6.25; 6.25; 6.25 MG/1; MG/1; MG/1; MG/1
25 CAPSULE, EXTENDED RELEASE ORAL EVERY MORNING
Qty: 30 CAPSULE | Refills: 0 | Status: SHIPPED | OUTPATIENT
Start: 2025-04-28

## 2025-05-15 ENCOUNTER — TELEMEDICINE (OUTPATIENT)
Dept: PSYCHIATRY | Facility: CLINIC | Age: 40
End: 2025-05-15
Payer: COMMERCIAL

## 2025-05-15 DIAGNOSIS — F41.9 ANXIETY: ICD-10-CM

## 2025-05-15 DIAGNOSIS — F90.2 ATTENTION DEFICIT HYPERACTIVITY DISORDER, COMBINED TYPE: Primary | ICD-10-CM

## 2025-05-15 DIAGNOSIS — T88.7XXA MEDICATION SIDE EFFECTS: ICD-10-CM

## 2025-05-15 RX ORDER — DEXTROAMPHETAMINE SACCHARATE, AMPHETAMINE ASPARTATE MONOHYDRATE, DEXTROAMPHETAMINE SULFATE AND AMPHETAMINE SULFATE 6.25; 6.25; 6.25; 6.25 MG/1; MG/1; MG/1; MG/1
25 CAPSULE, EXTENDED RELEASE ORAL EVERY MORNING
Qty: 30 CAPSULE | Refills: 0 | Status: SHIPPED | OUTPATIENT
Start: 2025-05-15

## 2025-05-15 NOTE — PATIENT INSTRUCTIONS
For concerns or needing assistance call the [facility] at [phone]  Continue Adderall XR 25 mg every morning  Call 1 week prior to needing refills  Stop Prozac  Pt agreeable to referral for in person therapy services

## 2025-05-15 NOTE — PROGRESS NOTES
" Mode of Visit: Video  Location of patient: -WORK-  Location of provider: +HOME+  You have chosen to receive care through a telehealth visit.  The patient has signed the video visit consent form.  The visit included audio and video interaction. No technical issues occurred during this visit.     Patient verbally confirmed consent for today's encounter  05/15/2025      Subjective   Christie Griffith is a 40 y.o. female who presents today for follow up for medication management    Chief Complaint:  \"ADHD\"     History of Present Illness:    History of Present Illness  6-week follow-up.  Switched Paxil to Prozac due to increased appetite side effect on the Paxil.  Primary care had placed her on Paxil to help with symptoms of surgical menopause, and anxiety related to medical trauma/PTSD.  Unfortunately the Prozac is giving her the same symptoms and also is making her feel very frustrated, she reports she really has to struggle to keep her irritability at bay.  She continues to gain weight and have increased appetite.  At this point I feel like we need to come off of the Prozac, and SSRIs may not be a good fit for her especially with the diagnosis of ADHD.  Patient would like a referral for therapy so I will reach out to local therapist in [place] as she prefers to go in person.  She feels like the Adderall is beneficial for her focus and concentration.  She is not as distracted.     She remains on her estrogen blocking medication from oncology.                The following portions of the patient's history were reviewed and updated as appropriate: allergies, current medications, past family history, past medical history, past social history, past surgical history and problem list.    Past Psychiatric History:  Patient reports only taking the Wellbutrin, no history of admissions, no history of self harming behaviors, no SI, HI, hallucinations or psychosis.  She reports she did have postpartum depression and managed to " work through it but it was very difficult.    Past Medical History:  Past Medical History:   Diagnosis Date    ADHD (attention deficit hyperactivity disorder) 7-22    Generalized anxiety disorder     Heart murmur     Malignant neoplasm of upper-outer quadrant of right female breast 02/03/2023    PONV (postoperative nausea and vomiting)     Psoriasis     Urinary tract infection     Varicella 1990    Visual impairment        Substance Abuse History:   Types:Denies all, including illicit      Social History:  Social History     Socioeconomic History    Marital status:      Spouse name: Aurelio    Number of children: 1    Highest education level: Master's degree (e.g., MA, MS, Emma, MEd, MSW, JUSTINA)   Tobacco Use    Smoking status: Never    Smokeless tobacco: Never   Vaping Use    Vaping status: Never Used   Substance and Sexual Activity    Alcohol use: Not Currently    Drug use: Never    Sexual activity: Yes     Partners: Male     Birth control/protection: Post-menopausal   Patient reports good support system with family and friends.  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  Reports Baptism belief system.  Denies any history of  service or any legal issues.    Family History:  Family History   Problem Relation Age of Onset    Lymphoma Mother     Diabetes Mother     Heart disease Mother     Cancer Mother     Coronary artery disease Father     Atrial fibrillation Father     Heart disease Father     Diabetes Father     Thyroid disease Other     Heart murmur Daughter        Past Surgical History:  Past Surgical History:   Procedure Laterality Date    BILATERAL BREAST REDUCTION  01/2009    BREAST BIOPSY  2/1/23    Right    MASTECTOMY Bilateral 07/26/2023    MOUTH SURGERY      x 4 dental implants    REDUCTION MAMMAPLASTY      VENOUS ACCESS DEVICE (PORT) INSERTION Left 02/16/2023    Procedure: MEDIPORT PLACEMENT              (C-ARM#2);  Surgeon: Bruce Charles  MD;  Location: [facility];  Service: General;  Laterality: Left;    WISDOM TOOTH EXTRACTION         Problem List:  Patient Active Problem List   Diagnosis    Mass of upper outer quadrant of right breast    Malignant neoplasm of upper-outer quadrant of right female breast    Other long term (current) drug therapy    Encounter for venous access device care    CINV (chemotherapy-induced nausea and vomiting)    Skin rash    Mucositis due to antineoplastic therapy    Palmar plantar 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      exemestane (AROMASIN) 25 MG tablet Take 1 tablet by mouth Daily. 90 tablet 0    losartan (COZAAR) 50 MG tablet Take 1.5 tablets by mouth Daily.       No current facility-administered medications for this visit.       Review of Systems:    Review of Systems   All other systems reviewed and are negative.        Physical Exam:   Physical Exam  Vitals reviewed.   Constitutional:       Appearance: Normal appearance. She is well-developed and well-groomed.   HENT:      Head: Normocephalic.   Neurological:      Mental Status: She is alert.   Psychiatric:         Attention and Perception: Attention and perception normal.         Mood and Affect: Mood and affect normal.         Speech: Speech normal.         Behavior: Behavior normal. Behavior is cooperative.         Thought Content: Thought content normal.         Cognition and Memory: Cognition normal.         Judgment: Judgment normal.      Comments:           Vitals:  not currently breastfeeding. There is  no height or weight on file to calculate BMI.  Due to extenuating circumstances and possible current health risks associated with the patient being present in a clinical setting (with current health restrictions in place in regards to possible COVID 19 transmission/exposure), the patient was seen remotely today via a MyChart Video Visit through [facility] and telephone encounter.  Unable to obtain vital signs due to nature of remote visit.  Height stated at 67 inches.  Weight stated at 175 pounds.    Last 3 Blood Pressure Readings:  BP Readings from Last 3 Encounters:   08/16/23 131/86   08/11/23 142/68   08/09/23 166/78   Blood pressure reading from visit with Dr. Quan at [facility] was 130/80 June 2024  Blood pressure reading from visit with oncologist September 2024 was elevated at 172/102.  It is noted that this was a manual reading.  Blood pressure 146/84 November 2024 visit with cardiologist  Blood pressure 130/80 March 4, 2025 at Dr. Ellie Qaun office        Mental Status Exam:   Hygiene:   good  Cooperation:  Cooperative  Eye Contact:  Good  Psychomotor Behavior:  Appropriate  Affect:  Full range  Mood: euthymic  Hopelessness: Denies  Speech:  Normal  Thought Process:  Goal directed and Linear  Thought Content:  Normal  Suicidal:  None  Homicidal:  None  Hallucinations:  None  Delusion:  None  Memory:  Intact  Orientation:  Person, Place, Time and Situation  Reliability:  good  Insight:  Good  Judgement:  Good  Impulse Control:  Good  Physical/Medical Issues:  Yes patient has completed chemotherapy, bilateral mastectomy surgery, and physical therapy related to breast cancer diagnosis  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  Patient underwent a bilateral oophorectomy July 10, 2024  PTSD symptoms from medical trauma identified by PCP per pt report. Exacerbation of HTN which has gotten better with job change and with medication.          Assessment & Plan   Diagnoses and all  "orders for this visit:    1. Attention deficit hyperactivity disorder, combined type (Primary)  -     amphetamine-dextroamphetamine XR (ADDERALL XR) 25 MG 24 hr capsule; Take 1 capsule by mouth Every Morning  Dispense: 30 capsule; Refill: 0    2. Anxiety    3. Medication side effects                  Visit Diagnoses:    ICD-10-CM ICD-9-CM   1. Attention deficit hyperactivity disorder, combined type  F90.2 314.01   2. Anxiety  F41.9 300.00   3. Medication side effects  T88.7XXA 995.20                 GOALS:  Short Term Goals: Patient will be compliant with medication, and patient will have no significant medication related side effects.  Patient will be engaged in psychotherapy as indicated.  Patient will report subjective improvement of symptoms.  Long term goals: To stabilize mood and treat/improve subjective symptoms, the patient will stay out of the hospital, the patient will be at an optimal level of functioning, and the patient will take all medications as prescribed.  The patient/guardian verbalized understanding and agreement with goals that were mutually set.    RISK ASSESSMENT  Current harm-to-self risk is reported by pt as \"none.\"  Current [id number] risk is reported by pt as \"none.\"   No suicidal thoughts, intent, plan is appreciated by this provider on this date of exam.   No homicidal thoughts, intent, plan is appreciated by this provider on this date.    TREATMENT PLAN: Continue supportive psychotherapy efforts and medications as indicated.   Pharmacological and Non-Pharmacological treatment options discussed during today's visit. Patient/Guardian acknowledged and verbally consented with current treatment plan and was educated on the importance of compliance with treatment and follow-up appointments.      MEDICATION ISSUES:  Discussed medication options and treatment plan of prescribed medication as well as the risks, benefits, any black box warnings, and side effects including potential falls, " possible impaired driving, and metabolic adversities among others. Patient is agreeable to call the office with any worsening of symptoms or onset of side effects, or if any concerns or questions arise.  The contact information for the office is made available to the patient. Patient is agreeable to call 911 or go to the nearest ER should they begin having any SI/HI, or if any urgent concerns arise. No medication side effects or related complaints today.     Continue Adderall XR 25 mg every morning  Call 1 week prior to needing refills  Stop Prozac  Pt agreeable to referral for in person therapy services       MEDS ORDERED DURING VISIT:  New Medications Ordered This Visit   Medications    amphetamine-dextroamphetamine XR (ADDERALL XR) 25 MG 24 hr capsule     Sig: Take 1 capsule by mouth Every Morning     Dispense:  30 capsule     Refill:  0     Fill when due/  DC prozac        Follow Up Appointment:   Return in about 3 months (around 8/18/2025) for Recheck, Video visit.               This document has been electronically signed by MELONY Reich  May 15, 2025 09:52 EDT    Please note that portions of this note were completed with a voice recognition program. Efforts were made to edit dictation, but occasionally words are mistranscribed.

## 2025-05-19 ENCOUNTER — TELEPHONE (OUTPATIENT)
Dept: PSYCHIATRY | Facility: CLINIC | Age: 40
End: 2025-05-19
Payer: COMMERCIAL

## 2025-05-19 NOTE — TELEPHONE ENCOUNTER
Please fax today's chart note and demographic info to   [facility]  [phone]-  FAX  Referral for therapy      I have called patient and obtained IVAN verbally to fax requested paperwork to [facility].  IVAN Scanned to chart and requested paperwork faxed to [phone].

## 2025-06-26 DIAGNOSIS — F90.2 ATTENTION DEFICIT HYPERACTIVITY DISORDER, COMBINED TYPE: ICD-10-CM

## 2025-06-26 RX ORDER — DEXTROAMPHETAMINE SACCHARATE, AMPHETAMINE ASPARTATE MONOHYDRATE, DEXTROAMPHETAMINE SULFATE AND AMPHETAMINE SULFATE 6.25; 6.25; 6.25; 6.25 MG/1; MG/1; MG/1; MG/1
25 CAPSULE, EXTENDED RELEASE ORAL EVERY MORNING
Qty: 30 CAPSULE | Refills: 0 | Status: SHIPPED | OUTPATIENT
Start: 2025-06-26

## 2025-08-06 DIAGNOSIS — F90.2 ATTENTION DEFICIT HYPERACTIVITY DISORDER, COMBINED TYPE: ICD-10-CM

## 2025-08-06 RX ORDER — DEXTROAMPHETAMINE SACCHARATE, AMPHETAMINE ASPARTATE MONOHYDRATE, DEXTROAMPHETAMINE SULFATE AND AMPHETAMINE SULFATE 6.25; 6.25; 6.25; 6.25 MG/1; MG/1; MG/1; MG/1
25 CAPSULE, EXTENDED RELEASE ORAL EVERY MORNING
Qty: 30 CAPSULE | Refills: 0 | Status: SHIPPED | OUTPATIENT
Start: 2025-08-06

## 2025-08-18 ENCOUNTER — TELEMEDICINE (OUTPATIENT)
Dept: PSYCHIATRY | Facility: CLINIC | Age: 40
End: 2025-08-18
Payer: COMMERCIAL

## 2025-08-18 DIAGNOSIS — Z51.81 MEDICATION MONITORING ENCOUNTER: ICD-10-CM

## 2025-08-18 DIAGNOSIS — F41.9 ANXIETY: ICD-10-CM

## 2025-08-18 DIAGNOSIS — F90.2 ATTENTION DEFICIT HYPERACTIVITY DISORDER, COMBINED TYPE: Primary | ICD-10-CM

## 2025-08-18 PROCEDURE — 99214 OFFICE O/P EST MOD 30 MIN: CPT | Performed by: NURSE PRACTITIONER

## 2025-08-18 PROCEDURE — 96127 BRIEF EMOTIONAL/BEHAV ASSMT: CPT | Performed by: NURSE PRACTITIONER

## 2025-08-18 RX ORDER — SEMAGLUTIDE 0.5 MG/.5ML
0.5 INJECTION, SOLUTION SUBCUTANEOUS
COMMUNITY
Start: 2025-07-09 | End: 2025-10-08

## 2025-08-18 RX ORDER — DESVENLAFAXINE 25 MG/1
25 TABLET, EXTENDED RELEASE ORAL DAILY
COMMUNITY
Start: 2025-06-13 | End: 2026-06-14

## (undated) DEVICE — CVR SURG EQUIP BND RECTG 36X28

## (undated) DEVICE — ST CVR PROB PULLUP ULTRASND 5X48IN

## (undated) DEVICE — CLTH CLENS READYCLEANSE PERI CARE PK/5

## (undated) DEVICE — GLV SURG SIGNATURE ESSENTIAL PF LTX SZ7

## (undated) DEVICE — TRAP FLD MINIVAC MEGADYNE 100ML

## (undated) DEVICE — GOWN,PREVENTION PLUS,XLNG/XXLARGE,STRL: Brand: MEDLINE

## (undated) DEVICE — GLV SURG SIGNATURE ESSENTIAL PF LTX SZ6.5

## (undated) DEVICE — TBG PENCL TELESCP MEGADYNE SMOKE EVAC 10FT

## (undated) DEVICE — INTENDED FOR TISSUE SEPARATION, AND OTHER PROCEDURES THAT REQUIRE A SHARP SURGICAL BLADE TO PUNCTURE OR CUT.: Brand: BARD-PARKER ® CARBON RIB-BACK BLADES

## (undated) DEVICE — SUT MONOCRYL 4/0 PS2 27IN Y426H ETY426H

## (undated) DEVICE — SUT PROLN 3/0 SH D/A 36IN 8522H

## (undated) DEVICE — PATIENT RETURN ELECTRODE, SINGLE-USE, CONTACT QUALITY MONITORING, ADULT, WITH 9FT CORD, FOR PATIENTS WEIGING OVER 33LBS. (15KG): Brand: MEGADYNE

## (undated) DEVICE — STERILE POLYISOPRENE POWDER-FREE SURGICAL GLOVES WITH EMOLLIENT COATING: Brand: PROTEXIS

## (undated) DEVICE — SOL IRR NACL 0.9PCT BT 1000ML

## (undated) DEVICE — SUT VIC 3/0 RB1 27IN J215H

## (undated) DEVICE — ADHS SKIN PREMIERPRO EXOFIN TOPICAL HI/VISC .5ML

## (undated) DEVICE — PK MAJ PROC LF 60